# Patient Record
Sex: MALE
[De-identification: names, ages, dates, MRNs, and addresses within clinical notes are randomized per-mention and may not be internally consistent; named-entity substitution may affect disease eponyms.]

---

## 2017-01-01 ENCOUNTER — HOSPITAL (OUTPATIENT)
Dept: OTHER | Age: 82
End: 2017-01-01

## 2017-02-09 ENCOUNTER — APPOINTMENT (OUTPATIENT)
Dept: CT IMAGING | Facility: HOSPITAL | Age: 82
DRG: 191 | End: 2017-02-09
Attending: INTERNAL MEDICINE
Payer: MEDICARE

## 2017-02-09 ENCOUNTER — APPOINTMENT (OUTPATIENT)
Dept: GENERAL RADIOLOGY | Facility: HOSPITAL | Age: 82
DRG: 191 | End: 2017-02-09
Attending: EMERGENCY MEDICINE
Payer: MEDICARE

## 2017-02-09 ENCOUNTER — HOSPITAL ENCOUNTER (INPATIENT)
Facility: HOSPITAL | Age: 82
LOS: 8 days | Discharge: HOME | DRG: 191 | End: 2017-02-17
Attending: EMERGENCY MEDICINE | Admitting: INTERNAL MEDICINE
Payer: MEDICARE

## 2017-02-09 DIAGNOSIS — R04.2 HEMOPTYSIS: ICD-10-CM

## 2017-02-09 DIAGNOSIS — J20.9 ACUTE BRONCHITIS, UNSPECIFIED ORGANISM: Primary | ICD-10-CM

## 2017-02-09 DIAGNOSIS — N28.9 RENAL INSUFFICIENCY: ICD-10-CM

## 2017-02-09 DIAGNOSIS — J44.1 COPD EXACERBATION (HCC): ICD-10-CM

## 2017-02-09 LAB
ALBUMIN SERPL-MCNC: 3.4 G/DL (ref 3.5–4.8)
ALP LIVER SERPL-CCNC: 69 U/L (ref 45–117)
ALT SERPL-CCNC: 22 U/L (ref 17–63)
APTT PPP: 29.2 SECONDS (ref 25–34)
AST SERPL-CCNC: 15 U/L (ref 15–41)
BASOPHILS # BLD AUTO: 0.02 X10(3) UL (ref 0–0.1)
BASOPHILS NFR BLD AUTO: 0.2 %
BILIRUB SERPL-MCNC: 0.7 MG/DL (ref 0.1–2)
BUN BLD-MCNC: 23 MG/DL (ref 8–20)
CALCIUM BLD-MCNC: 9 MG/DL (ref 8.3–10.3)
CHLORIDE: 105 MMOL/L (ref 101–111)
CO2: 25 MMOL/L (ref 22–32)
CREAT BLD-MCNC: 1.87 MG/DL (ref 0.7–1.3)
D-DIMER: 0.89 UG/ML FEU (ref 0–0.49)
EOSINOPHIL # BLD AUTO: 0.1 X10(3) UL (ref 0–0.3)
EOSINOPHIL NFR BLD AUTO: 0.9 %
ERYTHROCYTE [DISTWIDTH] IN BLOOD BY AUTOMATED COUNT: 14.1 % (ref 11.5–16)
GLUCOSE BLD-MCNC: 139 MG/DL (ref 70–99)
HCT VFR BLD AUTO: 37.4 % (ref 37–53)
HGB BLD-MCNC: 12.2 G/DL (ref 13–17)
IMMATURE GRANULOCYTE COUNT: 0.07 X10(3) UL (ref 0–1)
IMMATURE GRANULOCYTE RATIO %: 0.6 %
INR BLD: 0.98 (ref 0.89–1.12)
LYMPHOCYTES # BLD AUTO: 0.63 X10(3) UL (ref 0.9–4)
LYMPHOCYTES NFR BLD AUTO: 5.6 %
M PROTEIN MFR SERPL ELPH: 7.1 G/DL (ref 6.1–8.3)
MCH RBC QN AUTO: 30.7 PG (ref 27–33.2)
MCHC RBC AUTO-ENTMCNC: 32.6 G/DL (ref 31–37)
MCV RBC AUTO: 94.2 FL (ref 80–99)
MONOCYTES # BLD AUTO: 0.98 X10(3) UL (ref 0.1–0.6)
MONOCYTES NFR BLD AUTO: 8.7 %
NEUTROPHIL ABS PRELIM: 9.45 X10 (3) UL (ref 1.3–6.7)
NEUTROPHILS # BLD AUTO: 9.45 X10(3) UL (ref 1.3–6.7)
NEUTROPHILS NFR BLD AUTO: 84 %
PLATELET # BLD AUTO: 224 10(3)UL (ref 150–450)
POTASSIUM SERPL-SCNC: 4.2 MMOL/L (ref 3.6–5.1)
PRO-BETA NATRIURETIC PEPTIDE: 595 PG/ML (ref ?–450)
PROCALCITONIN SERPL-MCNC: <0.11 NG/ML (ref ?–0.11)
PSA SERPL DL<=0.01 NG/ML-MCNC: 13.3 SECONDS (ref 12.3–14.8)
RBC # BLD AUTO: 3.97 X10(6)UL (ref 3.8–5.8)
RED CELL DISTRIBUTION WIDTH-SD: 48.3 FL (ref 35.1–46.3)
RESPIRATORY PANEL NEG:: NEGATIVE
SODIUM SERPL-SCNC: 140 MMOL/L (ref 136–144)
TROPONIN: <0.046 NG/ML (ref ?–0.05)
WBC # BLD AUTO: 11.3 X10(3) UL (ref 4–13)

## 2017-02-09 PROCEDURE — 94640 AIRWAY INHALATION TREATMENT: CPT

## 2017-02-09 PROCEDURE — 94060 EVALUATION OF WHEEZING: CPT

## 2017-02-09 PROCEDURE — 85730 THROMBOPLASTIN TIME PARTIAL: CPT | Performed by: EMERGENCY MEDICINE

## 2017-02-09 PROCEDURE — 87999 UNLISTED MICROBIOLOGY PX: CPT

## 2017-02-09 PROCEDURE — 86738 MYCOPLASMA ANTIBODY: CPT | Performed by: INTERNAL MEDICINE

## 2017-02-09 PROCEDURE — 83880 ASSAY OF NATRIURETIC PEPTIDE: CPT | Performed by: EMERGENCY MEDICINE

## 2017-02-09 PROCEDURE — 96374 THER/PROPH/DIAG INJ IV PUSH: CPT

## 2017-02-09 PROCEDURE — 87581 M.PNEUMON DNA AMP PROBE: CPT | Performed by: INTERNAL MEDICINE

## 2017-02-09 PROCEDURE — 80053 COMPREHEN METABOLIC PANEL: CPT | Performed by: EMERGENCY MEDICINE

## 2017-02-09 PROCEDURE — 71250 CT THORAX DX C-: CPT

## 2017-02-09 PROCEDURE — 85025 COMPLETE CBC W/AUTO DIFF WBC: CPT | Performed by: EMERGENCY MEDICINE

## 2017-02-09 PROCEDURE — 84484 ASSAY OF TROPONIN QUANT: CPT | Performed by: EMERGENCY MEDICINE

## 2017-02-09 PROCEDURE — 84145 PROCALCITONIN (PCT): CPT | Performed by: EMERGENCY MEDICINE

## 2017-02-09 PROCEDURE — 87205 SMEAR GRAM STAIN: CPT | Performed by: INTERNAL MEDICINE

## 2017-02-09 PROCEDURE — 85610 PROTHROMBIN TIME: CPT | Performed by: EMERGENCY MEDICINE

## 2017-02-09 PROCEDURE — 87798 DETECT AGENT NOS DNA AMP: CPT | Performed by: INTERNAL MEDICINE

## 2017-02-09 PROCEDURE — 87633 RESP VIRUS 12-25 TARGETS: CPT | Performed by: INTERNAL MEDICINE

## 2017-02-09 PROCEDURE — 87486 CHLMYD PNEUM DNA AMP PROBE: CPT | Performed by: INTERNAL MEDICINE

## 2017-02-09 PROCEDURE — 86713 LEGIONELLA ANTIBODY: CPT | Performed by: INTERNAL MEDICINE

## 2017-02-09 PROCEDURE — 93010 ELECTROCARDIOGRAM REPORT: CPT

## 2017-02-09 PROCEDURE — 71010 XR CHEST AP PORTABLE  (CPT=71010): CPT

## 2017-02-09 PROCEDURE — 85378 FIBRIN DEGRADE SEMIQUANT: CPT | Performed by: INTERNAL MEDICINE

## 2017-02-09 PROCEDURE — 99285 EMERGENCY DEPT VISIT HI MDM: CPT

## 2017-02-09 PROCEDURE — 87070 CULTURE OTHR SPECIMN AEROBIC: CPT | Performed by: INTERNAL MEDICINE

## 2017-02-09 PROCEDURE — 93005 ELECTROCARDIOGRAM TRACING: CPT

## 2017-02-09 RX ORDER — METHYLPREDNISOLONE SODIUM SUCCINATE 40 MG/ML
60 INJECTION, POWDER, LYOPHILIZED, FOR SOLUTION INTRAMUSCULAR; INTRAVENOUS EVERY 12 HOURS
Status: DISCONTINUED | OUTPATIENT
Start: 2017-02-09 | End: 2017-02-10

## 2017-02-09 RX ORDER — IPRATROPIUM BROMIDE AND ALBUTEROL SULFATE 2.5; .5 MG/3ML; MG/3ML
3 SOLUTION RESPIRATORY (INHALATION)
Status: DISCONTINUED | OUTPATIENT
Start: 2017-02-09 | End: 2017-02-17

## 2017-02-09 RX ORDER — IPRATROPIUM BROMIDE AND ALBUTEROL SULFATE 2.5; .5 MG/3ML; MG/3ML
3 SOLUTION RESPIRATORY (INHALATION) ONCE
Status: COMPLETED | OUTPATIENT
Start: 2017-02-09 | End: 2017-02-09

## 2017-02-09 RX ORDER — IPRATROPIUM BROMIDE AND ALBUTEROL SULFATE 2.5; .5 MG/3ML; MG/3ML
3 SOLUTION RESPIRATORY (INHALATION) EVERY 6 HOURS PRN
Status: DISCONTINUED | OUTPATIENT
Start: 2017-02-10 | End: 2017-02-17

## 2017-02-09 RX ORDER — IPRATROPIUM BROMIDE AND ALBUTEROL SULFATE 2.5; .5 MG/3ML; MG/3ML
3 SOLUTION RESPIRATORY (INHALATION) 2 TIMES DAILY
Status: DISCONTINUED | OUTPATIENT
Start: 2017-02-09 | End: 2017-02-09

## 2017-02-09 RX ORDER — FUROSEMIDE 20 MG/1
20 TABLET ORAL DAILY
COMMUNITY
End: 2017-03-19

## 2017-02-09 RX ORDER — PANTOPRAZOLE SODIUM 40 MG/1
40 TABLET, DELAYED RELEASE ORAL
Status: DISCONTINUED | OUTPATIENT
Start: 2017-02-10 | End: 2017-02-17

## 2017-02-09 RX ORDER — FUROSEMIDE 20 MG/1
20 TABLET ORAL DAILY
Status: DISCONTINUED | OUTPATIENT
Start: 2017-02-10 | End: 2017-02-17

## 2017-02-09 RX ORDER — HEPARIN SODIUM 5000 [USP'U]/ML
5000 INJECTION, SOLUTION INTRAVENOUS; SUBCUTANEOUS EVERY 8 HOURS SCHEDULED
Status: DISCONTINUED | OUTPATIENT
Start: 2017-02-09 | End: 2017-02-17

## 2017-02-09 RX ORDER — IPRATROPIUM BROMIDE AND ALBUTEROL SULFATE 2.5; .5 MG/3ML; MG/3ML
3 SOLUTION RESPIRATORY (INHALATION)
Status: DISCONTINUED | OUTPATIENT
Start: 2017-02-09 | End: 2017-02-09

## 2017-02-09 RX ORDER — LEVOFLOXACIN 750 MG/1
750 TABLET ORAL DAILY
Status: ON HOLD | COMMUNITY
End: 2017-02-17

## 2017-02-09 RX ORDER — METHYLPREDNISOLONE SODIUM SUCCINATE 125 MG/2ML
100 INJECTION, POWDER, LYOPHILIZED, FOR SOLUTION INTRAMUSCULAR; INTRAVENOUS ONCE
Status: COMPLETED | OUTPATIENT
Start: 2017-02-09 | End: 2017-02-09

## 2017-02-09 RX ORDER — ASPIRIN 81 MG/1
81 TABLET ORAL DAILY
Status: DISCONTINUED | OUTPATIENT
Start: 2017-02-09 | End: 2017-02-17

## 2017-02-09 NOTE — ED PROVIDER NOTES
Patient Seen in: [facility] Emergency Department    History   Patient presents with:  Dyspnea APRIL SOB (respiratory)    Stated Complaint: APRIL    HPI  Patient is an 80-year-old male who for the past 5 or 6 days has had cough productive yellowish phleg seen  Patient taking differently: 13 mg. 4 by mouth every morning for 1 day then 3-1/2 pills by mouth every morning for 3 days then 3 by mouth every morning for 5 days then 2 by mouth every morning for 5 days then 1 by mouth every morning until seen    Calvin above.    PSFH elements reviewed from today and agreed except as otherwise stated in HPI.     Physical Exam       ED Triage Vitals   BP 02/09/17 0856 138/75 mmHg   Pulse 02/09/17 0856 92   Resp 02/09/17 0856 22   Temp 02/09/17 0856 98.2 °F (36.8 °C)   Temp Normal   PTT, ACTIVATED - Normal    Narrative: The aPTT Heparin Therapeutic Range is approximately 65- 104 seconds. The therapeutic range has been validated against 0.3-0.7 heparin anti-Xa units/mL.      TROPONIN I - Normal   CBC WITH DIFFERENTIAL WITH Reviewed: 11/30/2015          ICD-10-CM Noted POA    Acute bronchitis J20.9 2/9/2017 Unknown

## 2017-02-09 NOTE — PLAN OF CARE
Pt transferred to room 2603 around 1230. Admitted from ED for bronchitis and hemoptysis. Pt a/o x 4, 3L nasal cannula, v-paced on tele monitor in 90s. Lungs diminished bilaterally, exp wheezing noted to left lower lobe. C/o dyspnea upon exertion.    P

## 2017-02-09 NOTE — ED INITIAL ASSESSMENT (HPI)
Pt states he has h/o copd and started with cough congestion and yellow secretions on Sunday. DR Cinthia Akers PLACED PT ON LEVAQUIN AND PT STARTED WITH WORSENING SOB ON Sunday THAT HAS NOT IMPROVED SINCE THEN. PT STATES THIS IS THE WORST EPISODE OF SOB HE HAS HAD.

## 2017-02-09 NOTE — CM/SW NOTE
SW met with patient to assess for discharge needs. Patient identified he lives at home with his son. Patient reports being independent in ADL's prior to Sunday, when he began to experience shortness of breath, and began coughing up yellow/bloody sputum.

## 2017-02-09 NOTE — PROGRESS NOTES
[facility] Cardiology Consultation Karlene Gaston MD    The patient was interviewed, examined, the chart was reviewed and the consult was dictated. This is a 80year old male with a chief complaint of SOB . Impression:  1.   Probable COPD exacerbation, underl

## 2017-02-09 NOTE — H&P
General Medicine H&P     Patient presents with:  Dyspnea APRIL SOB (respiratory)       PCP: None Pcp    History of Present Illness: Patient is a 80year old male with PMH sig for HTN, COPD on home o2 3L, GERD, heart block c PPM, who p/t [facility] ED c cough and PNA Eye Problems Brother      Eye disorder   • Other[other] [OTHER] Brother      Hearing loss       Review of Systems  Comprehensive ROS reviewed and negative except for what's stated above.  \    OBJECTIVE:  /61 mmHg  Pulse 103  Temp(Src) 98.5 °F (36.9 ° by: Austin Dinh MD on 2/09/2017 at 9:44     Approved by: Austin Dinh MD                 ASSESSMENT / PLAN:    80year old male with PMH sig for HTN, COPD on home o2 3L, GERD, heart block c PPM, who p/t [facility] ED c cough and PNA.      RLL PNA, productive sputum c he

## 2017-02-09 NOTE — CONSULTS
[facility]  Report of Consultation    Candace Carlton Patient Status:  Emergency    10/3/1932 MRN [id number]   Location [facility] Attending Mane Whittington MD   Hosp Day # 0 PCP None Pcp     Reason for Consultation:  Dyspnea OTHER DISEASES      barretts esphogus   • PULMONARY DISEASE      fibrosis   • COPD (chronic obstructive pulmonary disease) (HCC)    • Esophageal reflux    • Chicken pox    • Measles    • Hemorrhoids    • Lung disease    • Sinus problem    • COPD (chronic o (5' 9\") 171 lb (77.565 kg)           Physical Exam:   General: alert, cooperative, oriented. No respiratory distress. Head: Normocephalic, without obvious abnormality, atraumatic.    Eyes/Nose: No obvious lesion   Throat: Lips, mucosa, and tongue normal Hoarseness     Pleural effusion     At risk for falling     COPD with acute exacerbation (HCC)     COPD exacerbation (HCC)     Cardiac pacemaker in situ     Postural dizziness with presyncope     Syncopal episodes     Acute bronchitis    Impression      1.

## 2017-02-10 LAB
ALBUMIN SERPL-MCNC: 3 G/DL (ref 3.5–4.8)
ALP LIVER SERPL-CCNC: 59 U/L (ref 45–117)
ALT SERPL-CCNC: 17 U/L (ref 17–63)
AST SERPL-CCNC: 11 U/L (ref 15–41)
BASOPHILS # BLD AUTO: 0.01 X10(3) UL (ref 0–0.1)
BASOPHILS NFR BLD AUTO: 0.1 %
BILIRUB SERPL-MCNC: 0.9 MG/DL (ref 0.1–2)
BUN BLD-MCNC: 27 MG/DL (ref 8–20)
CALCIUM BLD-MCNC: 8.8 MG/DL (ref 8.3–10.3)
CHLORIDE: 102 MMOL/L (ref 101–111)
CO2: 27 MMOL/L (ref 22–32)
CREAT BLD-MCNC: 1.75 MG/DL (ref 0.7–1.3)
EOSINOPHIL # BLD AUTO: 0 X10(3) UL (ref 0–0.3)
EOSINOPHIL NFR BLD AUTO: 0 %
ERYTHROCYTE [DISTWIDTH] IN BLOOD BY AUTOMATED COUNT: 13.9 % (ref 11.5–16)
GLUCOSE BLD-MCNC: 159 MG/DL (ref 70–99)
HAV IGM SER QL: 2.2 MG/DL (ref 1.7–3)
HCT VFR BLD AUTO: 33.9 % (ref 37–53)
HGB BLD-MCNC: 11.2 G/DL (ref 13–17)
IMMATURE GRANULOCYTE COUNT: 0.07 X10(3) UL (ref 0–1)
IMMATURE GRANULOCYTE RATIO %: 0.8 %
LYMPHOCYTES # BLD AUTO: 0.39 X10(3) UL (ref 0.9–4)
LYMPHOCYTES NFR BLD AUTO: 4.4 %
M PROTEIN MFR SERPL ELPH: 6.5 G/DL (ref 6.1–8.3)
MCH RBC QN AUTO: 31.2 PG (ref 27–33.2)
MCHC RBC AUTO-ENTMCNC: 33 G/DL (ref 31–37)
MCV RBC AUTO: 94.4 FL (ref 80–99)
MONOCYTES # BLD AUTO: 0.18 X10(3) UL (ref 0.1–0.6)
MONOCYTES NFR BLD AUTO: 2 %
NEUTROPHIL ABS PRELIM: 8.19 X10 (3) UL (ref 1.3–6.7)
NEUTROPHILS # BLD AUTO: 8.19 X10(3) UL (ref 1.3–6.7)
NEUTROPHILS NFR BLD AUTO: 92.7 %
PHOSPHATE SERPL-MCNC: 3.7 MG/DL (ref 2.5–4.9)
PLATELET # BLD AUTO: 205 10(3)UL (ref 150–450)
POTASSIUM SERPL-SCNC: 4.4 MMOL/L (ref 3.6–5.1)
RBC # BLD AUTO: 3.59 X10(6)UL (ref 3.8–5.8)
RED CELL DISTRIBUTION WIDTH-SD: 48.4 FL (ref 35.1–46.3)
SODIUM SERPL-SCNC: 138 MMOL/L (ref 136–144)
WBC # BLD AUTO: 8.8 X10(3) UL (ref 4–13)

## 2017-02-10 PROCEDURE — 80053 COMPREHEN METABOLIC PANEL: CPT | Performed by: INTERNAL MEDICINE

## 2017-02-10 PROCEDURE — 87449 NOS EACH ORGANISM AG IA: CPT | Performed by: INTERNAL MEDICINE

## 2017-02-10 PROCEDURE — 94640 AIRWAY INHALATION TREATMENT: CPT

## 2017-02-10 PROCEDURE — 84100 ASSAY OF PHOSPHORUS: CPT | Performed by: INTERNAL MEDICINE

## 2017-02-10 PROCEDURE — 85025 COMPLETE CBC W/AUTO DIFF WBC: CPT | Performed by: INTERNAL MEDICINE

## 2017-02-10 PROCEDURE — 83735 ASSAY OF MAGNESIUM: CPT | Performed by: INTERNAL MEDICINE

## 2017-02-10 PROCEDURE — 87205 SMEAR GRAM STAIN: CPT | Performed by: INTERNAL MEDICINE

## 2017-02-10 PROCEDURE — 80299 QUANTITATIVE ASSAY DRUG: CPT | Performed by: INTERNAL MEDICINE

## 2017-02-10 RX ORDER — PREDNISONE 20 MG/1
40 TABLET ORAL
Status: DISCONTINUED | OUTPATIENT
Start: 2017-02-11 | End: 2017-02-13

## 2017-02-10 NOTE — PROGRESS NOTES
[facility] Hospitalist Progress Note     PCP: None Pcp    Chief Complaint: follow-up    Overnight/Interim Events:      SUBJECTIVE:  Pt sitting in chair. On 4L. Feels overall about the same. Cough a little better but still c sputum, a little blood.  No f/c, hasn' piperacillin-tazobactam  3.375 g Intravenous Q8H   • aspirin  81 mg Oral Daily   • Pantoprazole Sodium  40 mg Oral QAM AC   • furosemide  20 mg Oral Daily   • Heparin Sodium (Porcine)  5,000 Units Subcutaneous Q8H [address]        CEPACOL, ipratropium-albuterol

## 2017-02-10 NOTE — CONSULTS
[facility]    PATIENT'S NAME: Pavel Bethany   ATTENDING PHYSICIAN: Kadi Loco M.D.   CONSULTING PHYSICIAN: Lorri Soler M.D.    PATIENT ACCOUNT#:   [de-identified]    LOCATION:  [address] [place] [address]  MEDICAL RECORD #:   [id number]       DATE OF BIRTH distress. VITAL SIGNS:  Blood pressure is 132/60, pulse is 100. He is afebrile. HEENT:  Normocephalic, anicteric sclerae. NECK:  Supple. No thyromegaly, adenopathy. LUNGS:  Diminished breath sounds. Occasional rhonchi in bases.   HEART:  There is no

## 2017-02-10 NOTE — CDS QUERY
Pneumonia Specificity  CLINICAL DOCUMENTATION CLARIFICATION FORM    Dear Doctor:  Clinical information (provided below) indicates pneumonia.  For accurate ICD-10-CM code assignment to reflect severity of illness and risk of mortality,   PLEASE (X) ALL Mancil Frater

## 2017-02-10 NOTE — PROGRESS NOTES
[facility] Pulmonary/Critical Care Progress Note     SUBJECTIVE/24H Events: All events, procedures, notes reviewed.  Less sputum and hemoptysis, still doesn't feel great      Review of Systems:   A comprehensive 14 point for input(s): ABGPHT, GCXHZT0R, LVGOT6S, ABGHCO3, ABGBE, TEMP, YUE, SITE, DEV, THGB in the last 72 hours. Invalid input(s): DCO49BIA, SHOLA    Other Labs: neg PCt neg RVP    Interval Culture Data:   No results found for this visit on 02/09/17.   No resu

## 2017-02-10 NOTE — PROGRESS NOTES
[facility] Cardiology Progress Note - Mason Briscoe Patient Status:  Inpatient    10/3/1932 MRN [id number]   [facility] 2NE-A Attending Dalia Shea MD   Hosp Day # 1 PCP None Pcp     Subjective:  Patient has b excursions and effort. Abdomen: Soft, non-tender. No organosplenomegally, mass or rebound, BS-present. Extremities: Without clubbing, cyanosis or edema. Peripheral pulses are 2+. Neurologic: Alert and oriented, normal affect.  No motor or coordinational Q6H PRN       Nacho Fall MD  2/10/2017  1:21 PM

## 2017-02-10 NOTE — PROGRESS NOTES
02/10/17 1739   Clinical Encounter Type   Visited With Patient   Routine Visit Follow-up   Continue Visiting Yes  (Patient request for a  only)   Patient Spiritual Encounters   Spiritual Needs Patient expressed a need to feel in control   Spiritua

## 2017-02-10 NOTE — PLAN OF CARE
Assumed care of patient around 0700. Pt admitted yesterday from ED for bronchitis and hemoptysis. Pt a/o x 4, 3L nasal cannula sats 100%, v-paced on tele monitor in 90s. Lungs clear/diminished bilaterally. C/o dyspnea upon exertion.  Upon ambulation, O2

## 2017-02-11 LAB
ATRIAL RATE: 88 BPM
BUN BLD-MCNC: 42 MG/DL (ref 8–20)
CALCIUM BLD-MCNC: 8.4 MG/DL (ref 8.3–10.3)
CHLORIDE: 104 MMOL/L (ref 101–111)
CO2: 28 MMOL/L (ref 22–32)
CREAT BLD-MCNC: 1.98 MG/DL (ref 0.7–1.3)
ERYTHROCYTE [DISTWIDTH] IN BLOOD BY AUTOMATED COUNT: 14.4 % (ref 11.5–16)
GLUCOSE BLD-MCNC: 108 MG/DL (ref 70–99)
HAV IGM SER QL: 2.3 MG/DL (ref 1.7–3)
HCT VFR BLD AUTO: 33 % (ref 37–53)
HGB BLD-MCNC: 10.9 G/DL (ref 13–17)
MCH RBC QN AUTO: 31.2 PG (ref 27–33.2)
MCHC RBC AUTO-ENTMCNC: 33 G/DL (ref 31–37)
MCV RBC AUTO: 94.6 FL (ref 80–99)
MYCOPLASMA PNEUMONIAE AB, IGM: 0.09 U/L
P AXIS: 35 DEGREES
P-R INTERVAL: 168 MS
PLATELET # BLD AUTO: 215 10(3)UL (ref 150–450)
POTASSIUM SERPL-SCNC: 4 MMOL/L (ref 3.6–5.1)
Q-T INTERVAL: 394 MS
QRS DURATION: 150 MS
QTC CALCULATION (BEZET): 476 MS
R AXIS: -76 DEGREES
RBC # BLD AUTO: 3.49 X10(6)UL (ref 3.8–5.8)
RED CELL DISTRIBUTION WIDTH-SD: 49.1 FL (ref 35.1–46.3)
SODIUM SERPL-SCNC: 141 MMOL/L (ref 136–144)
STREPTOCOCCUS PNEUMONIAE AG, U: NEGATIVE
T AXIS: 88 DEGREES
VENTRICULAR RATE: 88 BPM
WBC # BLD AUTO: 9.6 X10(3) UL (ref 4–13)

## 2017-02-11 PROCEDURE — 85027 COMPLETE CBC AUTOMATED: CPT | Performed by: INTERNAL MEDICINE

## 2017-02-11 PROCEDURE — 80048 BASIC METABOLIC PNL TOTAL CA: CPT | Performed by: INTERNAL MEDICINE

## 2017-02-11 PROCEDURE — 83735 ASSAY OF MAGNESIUM: CPT | Performed by: INTERNAL MEDICINE

## 2017-02-11 PROCEDURE — 94640 AIRWAY INHALATION TREATMENT: CPT

## 2017-02-11 NOTE — PROGRESS NOTES
[facility] Pulmonary/Critical Care Progress Note     SUBJECTIVE/24H Events: All events, procedures, notes reviewed.  A little better today      Review of Systems:   A comprehensive 14 point review of systems was complete 8.8  9.6   PLT  224.0  205.0  215.0     No results for input(s): BNP in the last 72 hours.   Recent Labs   Lab  02/09/17   0911   TROP  <0.046     Recent Labs   Lab  02/09/17 0911   INR  0.98   PTT  29.2     No results for input(s): ABGPHT, LEUHXA3G, ABGP inhalers, steroids  · PT/IS/OOBTC  · DVT/GI prophylaxis    Dori Azevedo MD MPH [address] [facility]

## 2017-02-11 NOTE — PROGRESS NOTES
[facility] Cardiology Progress Note - Ruthann Shine Patient Status:  Inpatient    10/3/1932 MRN [id number]   [facility] 2NE-A Attending Unique Ortega MD   Hosp Day # 2 PCP None Pcp     Subjective:  Chart reviewe adenopathy. Neck: No JVD, carotids 2+, no bruits. Cardiac: Regular rate and rhythm. S1, S2 normal. No murmur, pericardial rub, S3, thrill, heave or extra cardiac sounds. Lungs: Clear without wheezes, rales, rhonchi or dullness.   Normal excursions and ef Intravenous Q8H   aspirin EC tab 81 mg 81 mg Oral Daily   Pantoprazole Sodium (PROTONIX) EC tab 40 mg 40 mg Oral QAM AC   furosemide (LASIX) tab 20 mg 20 mg Oral Daily   Heparin Sodium (Porcine) 5000 UNIT/ML injection 5,000 Units 5,000 Units Subcutaneous Q

## 2017-02-11 NOTE — PROGRESS NOTES
a  [facility] Hospitalist Progress Note     PCP: None Pcp    Chief Complaint: follow-up    Overnight/Interim Events:      SUBJECTIVE:  Pt reports coughing, still some blood/pale yellow sputum. HR 120s, reports spo2 to 80s while walking to bathroom. No cp.     OBJE predniSONE  40 mg Oral Daily with breakfast   • Fluticasone Furoate-Vilanterol  1 puff Inhalation Daily   • Umeclidinium Bromide  1 puff Inhalation Daily   • ipratropium-albuterol  3 mL Nebulization QID   • piperacillin-tazobactam  3.375 g Intravenous Q8H

## 2017-02-12 LAB — LEGIONELLA PNEUMOPHILA AG, URI: NEGATIVE

## 2017-02-12 PROCEDURE — 94640 AIRWAY INHALATION TREATMENT: CPT

## 2017-02-12 PROCEDURE — 94664 DEMO&/EVAL PT USE INHALER: CPT

## 2017-02-12 NOTE — PROGRESS NOTES
[facility] Pulmonary/Critical Care Progress Note     SUBJECTIVE/24H Events: All events, procedures, notes reviewed. Feels better      Review of Systems:   A comprehensive 14 point review of systems was completed.    Pert hours. No results for input(s): PT, INR, PTT in the last 72 hours. No results for input(s): ABGPHT, VTJFDZ0P, LIEDZ3K, ABGHCO3, ABGBE, TEMP, YUE, SITE, DEV, THGB in the last 72 hours. Invalid input(s): UDG55OGU, CHOB    Other Labs:     Interval Cultu

## 2017-02-12 NOTE — PROGRESS NOTES
a  [facility] Hospitalist Progress Note     PCP: None Pcp    Chief Complaint: follow-up    Overnight/Interim Events:  Bried episode vtach per report    SUBJECTIVE:  Pt sitting in chair. Feeling better, on 4L. Cough improving, no blood. Using IS.  [facility] Fluticasone Furoate-Vilanterol  1 puff Inhalation Daily   • Umeclidinium Bromide  1 puff Inhalation Daily   • ipratropium-albuterol  3 mL Nebulization QID   • aspirin  81 mg Oral Daily   • Pantoprazole Sodium  40 mg Oral QAM AC   • furosemide  20 mg Oral D

## 2017-02-13 LAB
BUN BLD-MCNC: 31 MG/DL (ref 8–20)
CALCIUM BLD-MCNC: 9.1 MG/DL (ref 8.3–10.3)
CHLORIDE: 105 MMOL/L (ref 101–111)
CO2: 28 MMOL/L (ref 22–32)
CREAT BLD-MCNC: 1.75 MG/DL (ref 0.7–1.3)
ERYTHROCYTE [DISTWIDTH] IN BLOOD BY AUTOMATED COUNT: 14.3 % (ref 11.5–16)
GLUCOSE BLD-MCNC: 86 MG/DL (ref 70–99)
HAV IGM SER QL: 2.3 MG/DL (ref 1.7–3)
HCT VFR BLD AUTO: 33.2 % (ref 37–53)
HGB BLD-MCNC: 11.1 G/DL (ref 13–17)
MCH RBC QN AUTO: 31.6 PG (ref 27–33.2)
MCHC RBC AUTO-ENTMCNC: 33.4 G/DL (ref 31–37)
MCV RBC AUTO: 94.6 FL (ref 80–99)
PLATELET # BLD AUTO: 230 10(3)UL (ref 150–450)
POTASSIUM SERPL-SCNC: 4.2 MMOL/L (ref 3.6–5.1)
RBC # BLD AUTO: 3.51 X10(6)UL (ref 3.8–5.8)
RED CELL DISTRIBUTION WIDTH-SD: 49.2 FL (ref 35.1–46.3)
SODIUM SERPL-SCNC: 140 MMOL/L (ref 136–144)
WBC # BLD AUTO: 9.5 X10(3) UL (ref 4–13)

## 2017-02-13 PROCEDURE — 94664 DEMO&/EVAL PT USE INHALER: CPT

## 2017-02-13 PROCEDURE — 83735 ASSAY OF MAGNESIUM: CPT | Performed by: INTERNAL MEDICINE

## 2017-02-13 PROCEDURE — 94640 AIRWAY INHALATION TREATMENT: CPT

## 2017-02-13 PROCEDURE — 80048 BASIC METABOLIC PNL TOTAL CA: CPT | Performed by: INTERNAL MEDICINE

## 2017-02-13 PROCEDURE — 85027 COMPLETE CBC AUTOMATED: CPT | Performed by: INTERNAL MEDICINE

## 2017-02-13 NOTE — PROGRESS NOTES
02/13/17 1326   Clinical Encounter Type   Referral To (Referral made to Father Karina Suarez for visit from a , as requested by the patient. )

## 2017-02-13 NOTE — PROGRESS NOTES
[facility] Cardiology Progress Note - April Bentley Patient Status:  Inpatient    10/3/1932 MRN [id number]   [facility] 2NE-A Attending Jocelyn Jara MD   Hosp Day # 4 PCP None Pcp     Subjective:  Patient is gr S3, thrill, heave or extra cardiac sounds. Lungs: Clear without wheezes, rales, rhonchi or dullness. Normal excursions and effort. Abdomen: Soft, non-tender. No organosplenomegally, mass or rebound, BS-present.   Extremities: Without clubbing, cyanosis o ipratropium-albuterol (DUONEB) nebulizer solution 3 mL 3 mL Nebulization Q6H PRN       Jayne Vásquez MD  2/13/2017  10:55 AM

## 2017-02-13 NOTE — PROGRESS NOTES
[facility]  Progress Note    Corin Ruiz Patient Status:  Inpatient    10/3/1932 MRN [id number]   [facility] 2NE-A Attending Amor Horner MD   Hosp Day # 4 PCP None Pcp     ASSESSMENT      · Bronchitis  · Hemoptysis due to a kg)  05/27/16 : 167 lb (75.751 kg)  11/16/15 : 173 lb (78.472 kg)  11/10/15 : 171 lb (77.565 kg)      Physical Exam:  General: alert, oriented, no apparent distress  Heart: Regular rate and rhythm, normal S1S2  Lungs:  wheezign   Abdomen: soft, nontender, injection 5,000 Units 5,000 Units Subcutaneous Q8H [address]   ipratropium-albuterol (DUONEB) nebulizer solution 3 mL 3 mL Nebulization Q6H PRN       PEAK flow  PF Readings from Last 1 Encounters:  No data found for PF        Brandi Kid Anahi  2/13/2017  3:09

## 2017-02-13 NOTE — PROGRESS NOTES
a  [facility] Hospitalist Progress Note     PCP: None Pcp    Chief Complaint: follow-up    Overnight/Interim Events:    SUBJECTIVE:  Pt sitting in chair. Feeling better in terms of breathing, on 4L. Little cough.  Walked to bathroom today and caridads sharmin Quinonez Later yester Intravenous Q8H   • azithromycin  500 mg Intravenous Q24H   • Fluticasone Furoate-Vilanterol  1 puff Inhalation Daily   • Umeclidinium Bromide  1 puff Inhalation Daily   • ipratropium-albuterol  3 mL Nebulization QID   • aspirin  81 mg Oral Daily   • Panto

## 2017-02-14 ENCOUNTER — APPOINTMENT (OUTPATIENT)
Dept: GENERAL RADIOLOGY | Facility: HOSPITAL | Age: 82
DRG: 191 | End: 2017-02-14
Attending: INTERNAL MEDICINE
Payer: MEDICARE

## 2017-02-14 PROCEDURE — 94640 AIRWAY INHALATION TREATMENT: CPT

## 2017-02-14 PROCEDURE — 36415 COLL VENOUS BLD VENIPUNCTURE: CPT

## 2017-02-14 PROCEDURE — 71020 XR CHEST PA + LAT CHEST (CPT=71020): CPT

## 2017-02-14 NOTE — CM/SW NOTE
Patient discussed in rounds with RN. Patient is on 3L O2 at rest which is patient's home amount, is needing 5L O2 with ambulation. SW endorsed to RN that patient will need home O2 walk if continues to have increased O2 need with ambulation.  At this time pa

## 2017-02-14 NOTE — PLAN OF CARE
Problem: CARDIOVASCULAR - ADULT  Goal: Maintains optimal cardiac output and hemodynamic stability  INTERVENTIONS:  - Monitor vital signs, rhythm, and trends  - Monitor for bleeding, hypotension and signs of decreased cardiac output  - Evaluate effectivenes 5L while walking earlier in the day. Plan is to continue IV antibiotics, nebs QID, lasix PO daily, inhalers and start prednisone in am.  He stated he \"feels better then when I came in\". Will monitor.

## 2017-02-14 NOTE — PROGRESS NOTES
[facility]  Progress Note    Krishna Person Patient Status:  Inpatient    10/3/1932 MRN [id number]   [facility] 2NE-A Attending Marko Pacheco MD   Hosp Day # 5 PCP None Pcp     Subjective:  Krishna Person is PRN   azithromycin (ZITHROMAX) 500 mg in sodium chloride 0.9 % 250 mL IVPB add-vantage 500 mg Intravenous Q24H   Fluticasone Furoate-Vilanterol (BREO ELLIPTA) 200-25 MCG/INH inhaler 1 puff 1 puff Inhalation Daily   Umeclidinium Bromide (INCRUSE ELLIPTA) 62 Neuro nonfocal   PLAN AS ABOVE   Slept Ok ( in chair) , did not ambulate last 24 h , except to bathroom - discussed with RN to [facility] for a walk\"  Breathing is better, cough same , cxr same   , up to 120 with activity  P:  Slow steroid taper   Ambulat

## 2017-02-14 NOTE — PROCEDURES
This test includes spirometry and flow volume loop done at the bedside during an inpatient hospitalization. Spirometry and  flow volume loop show evidence of moderate to severe obstruction.   .    There was no change in spirometry after bronchodilator ch

## 2017-02-15 PROCEDURE — 94640 AIRWAY INHALATION TREATMENT: CPT

## 2017-02-15 PROCEDURE — 94664 DEMO&/EVAL PT USE INHALER: CPT

## 2017-02-15 RX ORDER — GARLIC EXTRACT 500 MG
1 CAPSULE ORAL DAILY
Status: DISCONTINUED | OUTPATIENT
Start: 2017-02-15 | End: 2017-02-17

## 2017-02-15 RX ORDER — ACETYLCYSTEINE 200 MG/ML
2 SOLUTION ORAL; RESPIRATORY (INHALATION) 3 TIMES DAILY
Status: DISCONTINUED | OUTPATIENT
Start: 2017-02-15 | End: 2017-02-17

## 2017-02-15 NOTE — PROGRESS NOTES
a  [facility] Hospitalist Progress Note     PCP: None Pcp    Chief Complaint: follow-up      SUBJECTIVE:  Feels well this morning. 3L at rest, 5L with exertion.      OBJECTIVE:  Temp:  [97.9 °F (36.6 °C)-98.5 °F (36.9 °C)] 98.5 °F (36.9 °C)  Pulse:  [] 101 Inhalation Daily   • Umeclidinium Bromide  1 puff Inhalation Daily   • ipratropium-albuterol  3 mL Nebulization QID   • aspirin  81 mg Oral Daily   • Pantoprazole Sodium  40 mg Oral QAM AC   • furosemide  20 mg Oral Daily   • Heparin Sodium (Porcine)  5,00

## 2017-02-15 NOTE — PROGRESS NOTES
[facility]  Progress Note    Carter Hernandez Patient Status:  Inpatient    10/3/1932 MRN [id number]   [facility] 2NE-A Attending Elisa Murillo MD   Hosp Day # 6 PCP None Pcp     Assessment:      · Acute Bronchitis  · Hemoptysis / abov °C), temperature source Oral, resp. rate 22, height 5' 9\" (1.753 m), weight 167 lb 12.3 oz (76.1 kg), SpO2 98 %.     Intake/Output:    Intake/Output Summary (Last 24 hours) at 02/15/17 1317  Last data filed at 02/15/17 1300   Gross per 24 hour   Intake   1 (INCRUSE ELLIPTA) 62.5 MCG/INH inhaler 1 puff 1 puff Inhalation Daily   ipratropium-albuterol (DUONEB) nebulizer solution 3 mL 3 mL Nebulization QID   aspirin EC tab 81 mg 81 mg Oral Daily   Pantoprazole Sodium (PROTONIX) EC tab 40 mg 40 mg Oral QAM AC   f

## 2017-02-15 NOTE — PROGRESS NOTES
[facility] Cardiology Progress Note - Gil Merino Patient Status:  Inpatient    10/3/1932 MRN [id number]   [facility] 2NE-A Attending Nayan Escoto MD   Hosp Day # 5 PCP None Pcp     Subjective:  Feeling better at  wheezes, rales, rhonchi or dullness. Normal excursions and effort. Abdomen: Soft, non-tender. No organosplenomegally, mass or rebound, BS-present. Extremities: Without clubbing, cyanosis or edema. Peripheral pulses are 2+.   Neurologic: Alert and orient

## 2017-02-15 NOTE — PROGRESS NOTES
a  [facility] Hospitalist Progress Note     PCP: None Pcp    Chief Complaint: follow-up      SUBJECTIVE:  Doing okay. States that he thinks he  Is getting closer to baseline. Still needing to turn up O2 for basic activities like going to bathroom. Eating okay. ipratropium-albuterol  3 mL Nebulization QID   • aspirin  81 mg Oral Daily   • Pantoprazole Sodium  40 mg Oral QAM AC   • furosemide  20 mg Oral Daily   • Heparin Sodium (Porcine)  5,000 Units Subcutaneous Q8H [address]        CEPACOL, ipratropium-albuterol

## 2017-02-15 NOTE — PROGRESS NOTES
[facility] Cardiology Progress Note - Justin Marioter Patient Status:  Inpatient    10/3/1932 MRN [id number]   [facility] 2NE-A Attending Diana Smith MD   Hosp Day # 6 PCP None Pcp     Subjective:   The patient feels ok rate and rhythm. S1, S2 normal. No murmur, pericardial rub, S3, thrill, heave or extra cardiac sounds. Lungs: Clear without wheezes, rales, rhonchi or dullness. Normal excursions and effort. Abdomen: Soft, non-tender.  No organosplenomegally, mass or felicia solution 3 mL 3 mL Nebulization Q6H PRN       Sari Saunders MD  2/15/2017  12:55 PM

## 2017-02-16 PROCEDURE — 87070 CULTURE OTHR SPECIMN AEROBIC: CPT | Performed by: INTERNAL MEDICINE

## 2017-02-16 PROCEDURE — 94640 AIRWAY INHALATION TREATMENT: CPT

## 2017-02-16 PROCEDURE — 87205 SMEAR GRAM STAIN: CPT | Performed by: INTERNAL MEDICINE

## 2017-02-16 PROCEDURE — 94667 MNPJ CHEST WALL 1ST: CPT

## 2017-02-16 RX ORDER — METHYLPREDNISOLONE SODIUM SUCCINATE 40 MG/ML
40 INJECTION, POWDER, LYOPHILIZED, FOR SOLUTION INTRAMUSCULAR; INTRAVENOUS EVERY 8 HOURS
Status: COMPLETED | OUTPATIENT
Start: 2017-02-16 | End: 2017-02-17

## 2017-02-16 NOTE — PROGRESS NOTES
SpO2 on 3L at rest: 100%  SpO2 on 6L during ambulation of 200 ft: 87%   ----> after 30 second break, saturations resolved to 90% or better

## 2017-02-16 NOTE — PLAN OF CARE
Denies any difficulty breathing tonight. 3L O2 at rest. No desaturations with activity tonight, but patient has not ambulated more than short distances in his room. States still has productive, yellow cough but hemoptysis has resolved.    Continued on antib

## 2017-02-16 NOTE — PROGRESS NOTES
a  [facility] Hospitalist Progress Note     PCP: None Pcp    Chief Complaint: follow-up      SUBJECTIVE:  Using 6L with ambulation. Expectorating yellow-brown sputum. Eating without problem. No leg swelling.     OBJECTIVE:  Temp:  [97.5 °F (36.4 °C)-98.6 °F (37 °C piperacillin-tazobactam  3.375 g Intravenous Q8H   • azithromycin  500 mg Intravenous Q24H   • Fluticasone Furoate-Vilanterol  1 puff Inhalation Daily   • Umeclidinium Bromide  1 puff Inhalation Daily   • ipratropium-albuterol  3 mL Nebulization QID   • as

## 2017-02-16 NOTE — PROGRESS NOTES
[facility]  Progress Note    Dean Tate Patient Status:  Inpatient    10/3/1932 MRN [id number]   [facility] 2NE-A Attending Isael Rodriguez MD   Hosp Day # 7 PCP None Pcp     Subjective:  Dean Tate is a(n) 80year old male Lance Harsh COPD with acute exacerbation (HCC)     COPD exacerbation (HCC)     Cardiac pacemaker in situ     Postural dizziness with presyncope     Syncopal episodes     Acute bronchitis     Acute bronchitis, unspecified organism     Hemoptysis     Renal insufficiency

## 2017-02-17 VITALS
SYSTOLIC BLOOD PRESSURE: 122 MMHG | BODY MASS INDEX: 24.75 KG/M2 | OXYGEN SATURATION: 98 % | RESPIRATION RATE: 18 BRPM | TEMPERATURE: 98 F | DIASTOLIC BLOOD PRESSURE: 62 MMHG | HEART RATE: 97 BPM | WEIGHT: 167.13 LBS | HEIGHT: 69 IN

## 2017-02-17 LAB
BASOPHILS # BLD AUTO: 0.01 X10(3) UL (ref 0–0.1)
BASOPHILS NFR BLD AUTO: 0.1 %
BUN BLD-MCNC: 30 MG/DL (ref 8–20)
CALCIUM BLD-MCNC: 8.5 MG/DL (ref 8.3–10.3)
CHLORIDE: 103 MMOL/L (ref 101–111)
CO2: 29 MMOL/L (ref 22–32)
CREAT BLD-MCNC: 1.89 MG/DL (ref 0.7–1.3)
EOSINOPHIL # BLD AUTO: 0 X10(3) UL (ref 0–0.3)
EOSINOPHIL NFR BLD AUTO: 0 %
ERYTHROCYTE [DISTWIDTH] IN BLOOD BY AUTOMATED COUNT: 14.1 % (ref 11.5–16)
GLUCOSE BLD-MCNC: 162 MG/DL (ref 70–99)
HCT VFR BLD AUTO: 33.7 % (ref 37–53)
HGB BLD-MCNC: 11.1 G/DL (ref 13–17)
IMMATURE GRANULOCYTE COUNT: 0.2 X10(3) UL (ref 0–1)
IMMATURE GRANULOCYTE RATIO %: 1.9 %
LYMPHOCYTES # BLD AUTO: 0.57 X10(3) UL (ref 0.9–4)
LYMPHOCYTES NFR BLD AUTO: 5.4 %
MCH RBC QN AUTO: 31 PG (ref 27–33.2)
MCHC RBC AUTO-ENTMCNC: 32.9 G/DL (ref 31–37)
MCV RBC AUTO: 94.1 FL (ref 80–99)
MONOCYTES # BLD AUTO: 0.58 X10(3) UL (ref 0.1–0.6)
MONOCYTES NFR BLD AUTO: 5.5 %
NEUTROPHIL ABS PRELIM: 9.14 X10 (3) UL (ref 1.3–6.7)
NEUTROPHILS # BLD AUTO: 9.14 X10(3) UL (ref 1.3–6.7)
NEUTROPHILS NFR BLD AUTO: 87.1 %
PLATELET # BLD AUTO: 235 10(3)UL (ref 150–450)
POTASSIUM SERPL-SCNC: 4.5 MMOL/L (ref 3.6–5.1)
RBC # BLD AUTO: 3.58 X10(6)UL (ref 3.8–5.8)
RED CELL DISTRIBUTION WIDTH-SD: 48.8 FL (ref 35.1–46.3)
SODIUM SERPL-SCNC: 140 MMOL/L (ref 136–144)
WBC # BLD AUTO: 10.5 X10(3) UL (ref 4–13)

## 2017-02-17 PROCEDURE — 85025 COMPLETE CBC W/AUTO DIFF WBC: CPT | Performed by: INTERNAL MEDICINE

## 2017-02-17 PROCEDURE — 94640 AIRWAY INHALATION TREATMENT: CPT

## 2017-02-17 PROCEDURE — 80048 BASIC METABOLIC PNL TOTAL CA: CPT | Performed by: INTERNAL MEDICINE

## 2017-02-17 NOTE — PLAN OF CARE
Remains on 3L at rest tonight. Saturations in high 90s. Denies any SOB. Cough is non-productive tonight. Has been using flutter valve. IS up tp 2200. Was restarted on IV solumedrol today.   Lung sounds mostly diminished without any wheezing or crackle

## 2017-02-17 NOTE — DISCHARGE SUMMARY
[facility] Dis[facility] Summary    Patient ID:  Krishna Person  [id number]  54 year old  10/3/1932    Admit date: 2/9/2017  Discharge date and time: 2/17/17  Attending Physician: Marko Pacheco MD  Primary Care Physician: None Pcp     Admit Dx: Re clear  CV: RRR, +s1/s2  Lungs: CTAB, good respiratory effort  Abdomen: s/nt/nd  Ext: Moves all 4 extremities, no c/c/e  Neuro: CN Intact, no focal deficits    Discharge meds     Medication List      START taking these medications          clotrimazole 10 M obtained. PATIENT STATED HISTORY:  Cough. Pneumonia    FINDINGS:  LUNGS:  Cardiac silhouette and pulmonary vasculature within normal limits. COPD with pleural-parenchymal scarring, stable. No new focal consolidation, pleural effusion, or pneumothorax.  Lef on 2/10/2017 at 2:30     Approved by: Miguel Groves MD            Xr Chest Ap Portable  (cpt=71010)    2/9/2017  PROCEDURE:  XR CHEST AP PORTABLE (CPT=71010)  TECHNIQUE:  AP chest radiograph was obtained.   COMPARISON:  [facility] , XR CHEST PA + LAT CHEST (

## 2017-02-17 NOTE — PROGRESS NOTES
[facility] Progress Note     SUBJECTIVE/24H Events: All events, procedures, notes reviewed. Doing well      Review of Systems:   A comprehensive 14 point review of systems was completed.    Pertin hours.    Invalid input(s): [id number]    Other Labs: Interval Culture Data:   No results found for this visit on 02/09/17.   No results for input(s): Rainer Remy, [address] [facility], [address], [address], [address], [address] [facility], [address] [place] [address], [facility] JJ Billy

## 2017-02-17 NOTE — PROGRESS NOTES
Room air at rest 86%.   O2 at 3 liters nasal cannula at rest 96%  O2 at 4 liters nasal cannula with ambulation 85%  O2 5liter nasal cannula with ambulation 85%  O2 6liter nasal cannula with ambulation 94%

## 2017-02-17 NOTE — CM/SW NOTE
Pt is now requiring 3 liters at rest and 6 liters with ambulation. CM spoke with [employer] liaison regarding current amount of O2 pt is on at home. Confirmed pt is only on O2 at 3 liters, if pt is requiring more will need new MD order and new O2 sats.  Nurse amie

## 2017-02-20 NOTE — CM/SW NOTE
02/20/17 0800   Discharge disposition   Discharged to: Home or [address] [place] [address] after discharge None   Patient assessed for rehabilitation services?  Yes   Discharge transportation Private car   Patient discharged 2/17/17

## 2017-02-25 ENCOUNTER — TELEPHONE (OUTPATIENT)
Dept: RESPIRATORY THERAPY | Facility: HOSPITAL | Age: 82
End: 2017-02-25

## 2017-03-16 ENCOUNTER — HISTORICAL (OUTPATIENT)
Dept: ADMINISTRATIVE | Facility: HOSPITAL | Age: 82
End: 2017-03-16

## 2017-03-24 PROBLEM — J44.9 CHRONIC OBSTRUCTIVE PULMONARY DISEASE, UNSPECIFIED COPD TYPE (HCC): Status: ACTIVE | Noted: 2017-03-24

## 2017-07-24 ENCOUNTER — APPOINTMENT (OUTPATIENT)
Dept: GENERAL RADIOLOGY | Facility: HOSPITAL | Age: 82
DRG: 191 | End: 2017-07-24
Attending: EMERGENCY MEDICINE
Payer: MEDICARE

## 2017-07-24 ENCOUNTER — HOSPITAL ENCOUNTER (INPATIENT)
Facility: HOSPITAL | Age: 82
LOS: 6 days | Discharge: HOME | DRG: 191 | End: 2017-07-30
Attending: EMERGENCY MEDICINE | Admitting: INTERNAL MEDICINE
Payer: MEDICARE

## 2017-07-24 DIAGNOSIS — N28.9 RENAL INSUFFICIENCY: ICD-10-CM

## 2017-07-24 DIAGNOSIS — J20.9 ACUTE BRONCHITIS, UNSPECIFIED ORGANISM: ICD-10-CM

## 2017-07-24 DIAGNOSIS — J44.1 COPD EXACERBATION (HCC): Primary | ICD-10-CM

## 2017-07-24 LAB
ALBUMIN SERPL-MCNC: 3.4 G/DL (ref 3.5–4.8)
ALP LIVER SERPL-CCNC: 78 U/L (ref 45–117)
ALT SERPL-CCNC: 20 U/L (ref 17–63)
APTT PPP: 29.9 SECONDS (ref 25–34)
AST SERPL-CCNC: 16 U/L (ref 15–41)
BASOPHILS # BLD AUTO: 0.06 X10(3) UL (ref 0–0.1)
BASOPHILS NFR BLD AUTO: 0.5 %
BILIRUB SERPL-MCNC: 1.2 MG/DL (ref 0.1–2)
BILIRUB UR QL STRIP.AUTO: NEGATIVE
BUN BLD-MCNC: 22 MG/DL (ref 8–20)
CALCIUM BLD-MCNC: 9 MG/DL (ref 8.3–10.3)
CHLORIDE: 105 MMOL/L (ref 101–111)
CLARITY UR REFRACT.AUTO: CLEAR
CO2: 27 MMOL/L (ref 22–32)
COLOR UR AUTO: YELLOW
CREAT BLD-MCNC: 1.56 MG/DL (ref 0.7–1.3)
EOSINOPHIL # BLD AUTO: 0.09 X10(3) UL (ref 0–0.3)
EOSINOPHIL NFR BLD AUTO: 0.7 %
ERYTHROCYTE [DISTWIDTH] IN BLOOD BY AUTOMATED COUNT: 13.9 % (ref 11.5–16)
GLUCOSE BLD-MCNC: 91 MG/DL (ref 70–99)
GLUCOSE UR STRIP.AUTO-MCNC: >=500 MG/DL
HCT VFR BLD AUTO: 39.8 % (ref 37–53)
HGB BLD-MCNC: 13.2 G/DL (ref 13–17)
IMMATURE GRANULOCYTE COUNT: 0.05 X10(3) UL (ref 0–1)
IMMATURE GRANULOCYTE RATIO %: 0.4 %
INR BLD: 1.03 (ref 0.89–1.11)
KETONES UR STRIP.AUTO-MCNC: NEGATIVE MG/DL
LEUKOCYTE ESTERASE UR QL STRIP.AUTO: NEGATIVE
LYMPHOCYTES # BLD AUTO: 1.1 X10(3) UL (ref 0.9–4)
LYMPHOCYTES NFR BLD AUTO: 8.9 %
M PROTEIN MFR SERPL ELPH: 7.4 G/DL (ref 6.1–8.3)
MCH RBC QN AUTO: 30.1 PG (ref 27–33.2)
MCHC RBC AUTO-ENTMCNC: 33.2 G/DL (ref 31–37)
MCV RBC AUTO: 90.7 FL (ref 80–99)
MONOCYTES # BLD AUTO: 1.27 X10(3) UL (ref 0.1–0.6)
MONOCYTES NFR BLD AUTO: 10.3 %
NEUTROPHIL ABS PRELIM: 9.74 X10 (3) UL (ref 1.3–6.7)
NEUTROPHILS # BLD AUTO: 9.74 X10(3) UL (ref 1.3–6.7)
NEUTROPHILS NFR BLD AUTO: 79.2 %
NITRITE UR QL STRIP.AUTO: NEGATIVE
PH UR STRIP.AUTO: 6 [PH] (ref 4.5–8)
PLATELET # BLD AUTO: 248 10(3)UL (ref 150–450)
POTASSIUM SERPL-SCNC: 3.9 MMOL/L (ref 3.6–5.1)
PRO-BETA NATRIURETIC PEPTIDE: 1014 PG/ML (ref ?–450)
PROCALCITONIN SERPL-MCNC: <0.11 NG/ML (ref ?–0.11)
PROT UR STRIP.AUTO-MCNC: NEGATIVE MG/DL
PSA SERPL DL<=0.01 NG/ML-MCNC: 13.5 SECONDS (ref 12–14.3)
RBC # BLD AUTO: 4.39 X10(6)UL (ref 3.8–5.8)
RED CELL DISTRIBUTION WIDTH-SD: 46.2 FL (ref 35.1–46.3)
RESPIRATORY PANEL NEG:: NEGATIVE
SODIUM SERPL-SCNC: 139 MMOL/L (ref 136–144)
SP GR UR STRIP.AUTO: 1.01 (ref 1–1.03)
TROPONIN: <0.046 NG/ML (ref ?–0.05)
UROBILINOGEN UR STRIP.AUTO-MCNC: <2 MG/DL
WBC # BLD AUTO: 12.3 X10(3) UL (ref 4–13)

## 2017-07-24 PROCEDURE — 71010 XR CHEST AP PORTABLE  (CPT=71010): CPT | Performed by: EMERGENCY MEDICINE

## 2017-07-24 PROCEDURE — 99223 1ST HOSP IP/OBS HIGH 75: CPT | Performed by: INTERNAL MEDICINE

## 2017-07-24 RX ORDER — IPRATROPIUM BROMIDE AND ALBUTEROL SULFATE 2.5; .5 MG/3ML; MG/3ML
3 SOLUTION RESPIRATORY (INHALATION) 2 TIMES DAILY
Status: DISCONTINUED | OUTPATIENT
Start: 2017-07-24 | End: 2017-07-24

## 2017-07-24 RX ORDER — METHYLPREDNISOLONE SODIUM SUCCINATE 40 MG/ML
80 INJECTION, POWDER, LYOPHILIZED, FOR SOLUTION INTRAMUSCULAR; INTRAVENOUS EVERY 8 HOURS
Status: DISCONTINUED | OUTPATIENT
Start: 2017-07-24 | End: 2017-07-26

## 2017-07-24 RX ORDER — IPRATROPIUM BROMIDE AND ALBUTEROL SULFATE 2.5; .5 MG/3ML; MG/3ML
3 SOLUTION RESPIRATORY (INHALATION)
Status: DISCONTINUED | OUTPATIENT
Start: 2017-07-24 | End: 2017-07-24

## 2017-07-24 RX ORDER — ACETAMINOPHEN 325 MG/1
650 TABLET ORAL EVERY 6 HOURS PRN
Status: DISCONTINUED | OUTPATIENT
Start: 2017-07-24 | End: 2017-07-30

## 2017-07-24 RX ORDER — IPRATROPIUM BROMIDE AND ALBUTEROL SULFATE 2.5; .5 MG/3ML; MG/3ML
3 SOLUTION RESPIRATORY (INHALATION) EVERY 4 HOURS
Status: DISCONTINUED | OUTPATIENT
Start: 2017-07-24 | End: 2017-07-25

## 2017-07-24 RX ORDER — HEPARIN SODIUM 5000 [USP'U]/ML
5000 INJECTION, SOLUTION INTRAVENOUS; SUBCUTANEOUS EVERY 12 HOURS SCHEDULED
Status: DISCONTINUED | OUTPATIENT
Start: 2017-07-24 | End: 2017-07-30

## 2017-07-24 RX ORDER — FUROSEMIDE 20 MG/1
20 TABLET ORAL
Status: DISCONTINUED | OUTPATIENT
Start: 2017-07-24 | End: 2017-07-30

## 2017-07-24 RX ORDER — HEPARIN SODIUM 5000 [USP'U]/ML
5000 INJECTION, SOLUTION INTRAVENOUS; SUBCUTANEOUS EVERY 8 HOURS SCHEDULED
Status: DISCONTINUED | OUTPATIENT
Start: 2017-07-24 | End: 2017-07-24

## 2017-07-24 RX ORDER — PANTOPRAZOLE SODIUM 40 MG/1
40 TABLET, DELAYED RELEASE ORAL
Status: DISCONTINUED | OUTPATIENT
Start: 2017-07-24 | End: 2017-07-30

## 2017-07-24 RX ORDER — PANTOPRAZOLE SODIUM 40 MG/1
40 TABLET, DELAYED RELEASE ORAL
Status: DISCONTINUED | OUTPATIENT
Start: 2017-07-25 | End: 2017-07-25

## 2017-07-24 RX ORDER — IPRATROPIUM BROMIDE AND ALBUTEROL SULFATE 2.5; .5 MG/3ML; MG/3ML
3 SOLUTION RESPIRATORY (INHALATION) EVERY 4 HOURS PRN
Status: DISCONTINUED | OUTPATIENT
Start: 2017-07-24 | End: 2017-07-30

## 2017-07-24 RX ORDER — METHYLPREDNISOLONE SODIUM SUCCINATE 125 MG/2ML
125 INJECTION, POWDER, LYOPHILIZED, FOR SOLUTION INTRAMUSCULAR; INTRAVENOUS ONCE
Status: COMPLETED | OUTPATIENT
Start: 2017-07-24 | End: 2017-07-24

## 2017-07-24 RX ORDER — SODIUM CHLORIDE 9 MG/ML
INJECTION, SOLUTION INTRAVENOUS CONTINUOUS
Status: DISCONTINUED | OUTPATIENT
Start: 2017-07-24 | End: 2017-07-25

## 2017-07-24 RX ORDER — ASPIRIN 81 MG/1
81 TABLET ORAL DAILY
Status: DISCONTINUED | OUTPATIENT
Start: 2017-07-24 | End: 2017-07-30

## 2017-07-24 NOTE — PLAN OF CARE
NURSING ADMISSION NOTE    Pt admit w/ COPD exacerbation w/ family at bedside. Pt has hx of COPD, uses 3L NC at home. Went to ER after having SOB/ STEWART, productive cough. Pt A&O, states feeling better since admission. Denies pain.   Pt on 3L NC w/ sats o

## 2017-07-24 NOTE — BH PROGRESS NOTE
Went to the unit to see the pt and spoke with his nurse, Olegario Appiah. He stated the pt did not need to be seen for depression. He said, the order will be d/c.

## 2017-07-24 NOTE — CONSULTS
[facility] Cardiology Consultation Ant Britton MD    The patient was interviewed, examined, the chart was reviewed and the consult was dictated. This is a 80year old male with a chief complaint of shortness of breath. Impression:  1. COPD with exacerbatio

## 2017-07-24 NOTE — ED INITIAL ASSESSMENT (HPI)
Pt states sob started a couple days ago, had a little cp with coughing, coughing up yellow phelgm, sometimes clear, no fever

## 2017-07-24 NOTE — CONSULTS
[facility]  Report of Consultation    Stormy Gracia Patient Status:  Emergency    10/3/1932 MRN [id number]   Location [address] [facility] Attending Gwendolyn Matthews MD   Hosp Day # 0 PCP None Pcp     Reason for Consultation:  Jamie Hernandez has a 40.00 pack-year smoking history. He has never used smokeless tobacco. He reports that he drinks alcohol. He reports that he does not use drugs.     Allergies:    Dye [Radiology Cont*        Comment:Throat swells    Medications:      Prior to Admission Soft, supple neck; trachea midline, no adenopathy, no thyromegaly. Lungs: decreased , very minimal ex wheezign    Chest wall: No tenderness or deformity. Heart: Regular rate and rhythm, normal S1S2, no murmur.    Abdomen: soft, non-tender, non-distended month, Outpatient pulmonologist - Dr García Heads. Last exacerbation Feb 2017  5. Hypoxemia  - baseline on 3l O2  6. CKD some improvement with waxing and waning  7. Heart Block s/p PPM  8.  Slightly elev BNP, ECHO 11/2016  LVEF 55%, RVSP 41       Plan   · Empiric

## 2017-07-24 NOTE — H&P
[facility]                                                               History & Physical         [facility] Patient Status:  Inpatient    10/3/1932 MRN [id number]   [facility] 2NE-A Attending Dot Whipple MD   [address] Day # 0 history:  Family History   Problem Relation Age of Onset   • Stroke Father    • Cancer Mother      Cancer - stomach   • Eye Problems Brother      Eye disorder   • Other[other] [OTHER] Brother      Hearing loss      Reviewed    Social history:   reports bandar Pertinent positives and negatives noted in the the HPI. Physical Exam:     Vital signs: Blood pressure 126/85, pulse 98, temperature 99.1 °F (37.3 °C), temperature source Oral, resp.  rate 20, height 5' 9\" (1.753 m), weight 171 lb 1.6 oz (77.6 kg), SpO2 this morning. He has some chest pain with coughing. He has a history of asthma, chronic obstructive pulmonary disease,   and pacemaker placement in 2011.           FINDINGS:  The heart size is normal. There is severe bolus emphysematous changes noted bilate inpatient services that will reasonably be expected to span two midnight's based on the clinical documentation in H+P. Based on patients current state of illness, I anticipate that, after discharge, patient will require TBD.       Page Mercer MD  7/24/2

## 2017-07-24 NOTE — ED PROVIDER NOTES
Patient Seen in: [facility] Emergency Department    History   Patient presents with:  Dyspnea APRIL SOB (respiratory)  Abdomen/Flank Pain (GI/)    Stated Complaint: SHORTNESS OF BREATH, ABDOMINAL CRAMPING, COPD FLARE UP    HPI    Patient has a histo MCG/INH Inhalation Aerosol Powder, Breath Activated,  Inhale 1 puff into the lungs daily. vitamin E 400 UNITS Oral Cap,  Take 1,000 Units by mouth daily. omega-3 fatty acids 1000 MG Oral Cap,  Take 1,000 mg by mouth daily.    Omeprazole 40 MG Oral Capsu appropriately. He is able speak in full clear sentences. He is not coughing here.   His pulse oximetry on nasal cannula is 100%   Eyes: sclera white, conjunctiva pink and moist.  Lids and lashes are normal.  Nose: Slightly congested without purulent nasal ---------                               -----------         ------                     CBC W/ DIFFERENTIAL[[id number]]          Abnormal            Final result                 Please view results for these tests on the individual orders.    Carley Escobar pneumonitis. Pacemaker leads are stable. There is a tortuous thoracic aorta. Biapical pleural thickening  CONCLUSION:  Severe bullous emphysematous changes with fibrosis.  Compared to the previous study increased interstitial changes in the lung bases and m

## 2017-07-25 ENCOUNTER — APPOINTMENT (OUTPATIENT)
Dept: ULTRASOUND IMAGING | Facility: HOSPITAL | Age: 82
DRG: 191 | End: 2017-07-25
Attending: INTERNAL MEDICINE
Payer: MEDICARE

## 2017-07-25 ENCOUNTER — APPOINTMENT (OUTPATIENT)
Dept: GENERAL RADIOLOGY | Facility: HOSPITAL | Age: 82
DRG: 191 | End: 2017-07-25
Attending: INTERNAL MEDICINE
Payer: MEDICARE

## 2017-07-25 ENCOUNTER — APPOINTMENT (OUTPATIENT)
Dept: NUCLEAR MEDICINE | Facility: HOSPITAL | Age: 82
DRG: 191 | End: 2017-07-25
Attending: INTERNAL MEDICINE
Payer: MEDICARE

## 2017-07-25 LAB
ATRIAL RATE: 87 BPM
BASOPHILS # BLD AUTO: 0 X10(3) UL (ref 0–0.1)
BASOPHILS NFR BLD AUTO: 0 %
BUN BLD-MCNC: 26 MG/DL (ref 8–20)
CALCIUM BLD-MCNC: 8.9 MG/DL (ref 8.3–10.3)
CHLORIDE: 104 MMOL/L (ref 101–111)
CO2: 24 MMOL/L (ref 22–32)
CREAT BLD-MCNC: 1.66 MG/DL (ref 0.7–1.3)
D-DIMER: 0.97 UG/ML FEU (ref 0–0.49)
EOSINOPHIL # BLD AUTO: 0 X10(3) UL (ref 0–0.3)
EOSINOPHIL NFR BLD AUTO: 0 %
ERYTHROCYTE [DISTWIDTH] IN BLOOD BY AUTOMATED COUNT: 14 % (ref 11.5–16)
GLUCOSE BLD-MCNC: 172 MG/DL (ref 70–99)
GLUCOSE BLD-MCNC: 227 MG/DL (ref 65–99)
HAV IGM SER QL: 2.3 MG/DL (ref 1.7–3)
HCT VFR BLD AUTO: 35.2 % (ref 37–53)
HGB BLD-MCNC: 11.7 G/DL (ref 13–17)
IMMATURE GRANULOCYTE COUNT: 0.09 X10(3) UL (ref 0–1)
IMMATURE GRANULOCYTE RATIO %: 0.7 %
LYMPHOCYTES # BLD AUTO: 0.55 X10(3) UL (ref 0.9–4)
LYMPHOCYTES NFR BLD AUTO: 4.5 %
MCH RBC QN AUTO: 30 PG (ref 27–33.2)
MCHC RBC AUTO-ENTMCNC: 33.2 G/DL (ref 31–37)
MCV RBC AUTO: 90.3 FL (ref 80–99)
MONOCYTES # BLD AUTO: 0.32 X10(3) UL (ref 0.1–0.6)
MONOCYTES NFR BLD AUTO: 2.6 %
NEUTROPHIL ABS PRELIM: 11.17 X10 (3) UL (ref 1.3–6.7)
NEUTROPHILS # BLD AUTO: 11.17 X10(3) UL (ref 1.3–6.7)
NEUTROPHILS NFR BLD AUTO: 92.2 %
P AXIS: 50 DEGREES
P-R INTERVAL: 162 MS
PHOSPHATE SERPL-MCNC: 3.6 MG/DL (ref 2.5–4.9)
PLATELET # BLD AUTO: 231 10(3)UL (ref 150–450)
POTASSIUM SERPL-SCNC: 4.6 MMOL/L (ref 3.6–5.1)
PROCALCITONIN SERPL-MCNC: <0.11 NG/ML (ref ?–0.11)
Q-T INTERVAL: 410 MS
QRS DURATION: 158 MS
QTC CALCULATION (BEZET): 493 MS
R AXIS: -79 DEGREES
RBC # BLD AUTO: 3.9 X10(6)UL (ref 3.8–5.8)
RED CELL DISTRIBUTION WIDTH-SD: 46.4 FL (ref 35.1–46.3)
SED RATE-ML: 19 MM/HR (ref 0–12)
SODIUM SERPL-SCNC: 138 MMOL/L (ref 136–144)
T AXIS: 84 DEGREES
VENTRICULAR RATE: 87 BPM
WBC # BLD AUTO: 12.1 X10(3) UL (ref 4–13)

## 2017-07-25 PROCEDURE — 99232 SBSQ HOSP IP/OBS MODERATE 35: CPT | Performed by: INTERNAL MEDICINE

## 2017-07-25 PROCEDURE — 93970 EXTREMITY STUDY: CPT | Performed by: INTERNAL MEDICINE

## 2017-07-25 PROCEDURE — 71020 XR CHEST PA + LAT CHEST (CPT=71020): CPT | Performed by: INTERNAL MEDICINE

## 2017-07-25 PROCEDURE — 78582 LUNG VENTILAT&PERFUS IMAGING: CPT | Performed by: INTERNAL MEDICINE

## 2017-07-25 RX ORDER — IPRATROPIUM BROMIDE AND ALBUTEROL SULFATE 2.5; .5 MG/3ML; MG/3ML
3 SOLUTION RESPIRATORY (INHALATION)
Status: DISCONTINUED | OUTPATIENT
Start: 2017-07-26 | End: 2017-07-30

## 2017-07-25 NOTE — CONSULTS
[facility] Pharmacy Note: Route Optimization for Pantoprazole (PROTONIX)    Patient is currently on Pantoprazole (PROTONIX) 40 mg IV/PO every 24 hours.    The patient meets the criteria to convert to the oral equivalent as established by the IV to Oral conversion

## 2017-07-25 NOTE — RESPIRATORY THERAPY NOTE
-Maintain nebs Q4  -Diminished BS bilaterally  -Dyspnea on exertion, usually through ambulation  -Nebs Changed to QID, patient does not want to be waken up at night.  Will call for PRN txs.  -Acapella BID

## 2017-07-25 NOTE — PROGRESS NOTES
[facility] Cardiology Progress Note - Franko Main Patient Status:  Inpatient    10/3/1932 MRN [id number]   Location [facility] 2NE-A Attending Mary Kay Marin MD   Hosp Day # 1 PCP None Pcp     Subjective:   The patient feels s and oriented x 3. No apparent distress. No respiratory or constitutional distress. HEENT: Normocephalic, anicteric sclera, neck supple, no thyromegaly or adenopathy. Neck: No JVD, carotids 2+, no bruits. Cardiac: Regular rate and rhythm.  S1, S2 normal. (Porcine) 5000 UNIT/ML injection 5,000 Units 5,000 Units Subcutaneous 2 times per day   acetaminophen (TYLENOL) tab 650 mg 650 mg Oral Q6H PRN   ipratropium-albuterol (DUONEB) nebulizer solution 3 mL 3 mL Nebulization Q4H   ipratropium-albuterol (DUONEB) n

## 2017-07-25 NOTE — PROGRESS NOTES
[facility]  Progress Note     Stormy Gracia Patient Status:  Inpatient    10/3/1932 MRN [id number]   [facility] 2NE-A Attending Siena Jeffery MD   Hosp Day # 1 PCP None Pcp     Chief Complaint: SOB    S: Patient reports STEWART with m Intravenous QAM AC    Or   • Pantoprazole Sodium  40 mg Oral QAM AC   • azithromycin  500 mg Intravenous Q24H   • Heparin Sodium (Porcine)  5,000 Units Subcutaneous 2 times per day   • ipratropium-albuterol  3 mL Nebulization Q4H   • aspirin  81 mg Oral Da color, texture, turgor normal. No rashes or lesions  Neurologic: Awake,alert,nonfocal  Psych: Mood and affect appears normal    A/P as above. COPD exacerbation–improving on IV steroids and nebulized treatments. Oxygen requirement improving.     Dr. Pedro Billy

## 2017-07-25 NOTE — PROGRESS NOTES
[facility]  Progress Note    Carter Hernandez Patient Status:  Inpatient    10/3/1932 MRN [id number]   [facility] 2NE-A Attending Ivett Geiger MD   [facility] Day # 1 PCP None Pcp     Subjective:  Carter Hernandez is a(n) 80year old male.   He 13.9  14.0   NEPRELIM  9.74*  11.17*   WBC  12.3  12.1   PLT  248.0  231.0     Recent Labs   Lab  07/24/17   0731  07/25/17   0534   GLU  91  172*   BUN  22*  26*   CREATSERUM  1.56*  1.66*   CA  9.0  8.9   NA  139  138   K  3.9  4.6   CL  105  104   CO2 condition in his lung serologically, I have ordered another VQ scan comp limited by venous Doppler and a d-dimer assay. It is likely the hemoptysis is a complication of pneumonitis and/or bronchitis-for which she is already on treatment.   We will continue

## 2017-07-25 NOTE — PLAN OF CARE
Pt AOx4. SOB @ rest/exertion. O2 sats 97% on 3 L NC. Pt slept in recliner. Nebs Q 4H; Solumedrol Q8H. V paced on tele. IV abx. Afebrile. Plan for CXR and labs in am. Pt updated on plan of care, will continue to monitor.

## 2017-07-25 NOTE — CM/SW NOTE
Attempted to meet with patient per COPD protocol order. Patient off the unit for testing. SW to follow up 7/26.

## 2017-07-25 NOTE — PLAN OF CARE
Pt admit w/ COPD exacerbation. Pt A&O, states feeling somewhat better today. Baseline of 3L NC in place, pt satting 99%- wears 3L of home O2. Pt does not want to be weaned down. Pt becomes very SOB w/ exertion, recovers quickly. Vpaced on tele.   Pt u

## 2017-07-26 LAB
GLUCOSE BLD-MCNC: 102 MG/DL (ref 65–99)
GLUCOSE BLD-MCNC: 143 MG/DL (ref 65–99)

## 2017-07-26 PROCEDURE — 99232 SBSQ HOSP IP/OBS MODERATE 35: CPT | Performed by: HOSPITALIST

## 2017-07-26 RX ORDER — DOCUSATE SODIUM 100 MG/1
100 CAPSULE, LIQUID FILLED ORAL 2 TIMES DAILY PRN
Status: DISCONTINUED | OUTPATIENT
Start: 2017-07-26 | End: 2017-07-27

## 2017-07-26 RX ORDER — METHYLPREDNISOLONE SODIUM SUCCINATE 40 MG/ML
60 INJECTION, POWDER, LYOPHILIZED, FOR SOLUTION INTRAMUSCULAR; INTRAVENOUS EVERY 12 HOURS
Status: COMPLETED | OUTPATIENT
Start: 2017-07-26 | End: 2017-07-28

## 2017-07-26 NOTE — PLAN OF CARE
RESPIRATORY - ADULT    • Achieves optimal ventilation and oxygenation Progressing        Patient is alert and oriented x4. V paced/NSR on tele. Oxygenation is 100% on 3 L NC. Patient L sounds are diminished with expiratory wheezes.  Patient denies chest jose juan

## 2017-07-26 NOTE — PROGRESS NOTES
[facility]  Progress Note    Lizzy Doherty Patient Status:  Inpatient    10/3/1932 MRN [id number]   [facility] 2NE-A Attending Neville Rasheed MD   [facility] Day # 2 PCP None Pcp     Subjective:  Lizzy Doherty is a(n) 80year old male.   H 90.3   MCH  30.1  30.0   MCHC  33.2  33.2   RDW  13.9  14.0   NEPRELIM  9.74*  11.17*   WBC  12.3  12.1   PLT  248.0  231.0     Recent Labs   Lab  07/24/17   0731  07/25/17   0534   GLU  91  172*   BUN  22*  26*   CREATSERUM  1.56*  1.66*   CA  9.0  8.9 Olimpia BARRAZA.  7/26/2017  9:23 AM

## 2017-07-26 NOTE — PLAN OF CARE
Assumed care of pt @ 2300. AOx4. On 3 L NC - STEWART. V paced on tele. Pt denies pain. Pt c/o SOB after using the bathroom. Neb Rx given with relief. Will continue to monitor.

## 2017-07-26 NOTE — PROGRESS NOTES
[facility] Cardiology Progress Note - Florian Arriaga Patient Status:  Inpatient    10/3/1932 MRN [id number]   [facility] 2NE-A Attending Nisha Couch MD   Hosp Day # 2 PCP None Pcp     Subjective:  Patient feels okay kg)  02/09/17 1258 : 161 lb 9.6 oz (73.3 kg)  02/09/17 0856 : 171 lb (77.6 kg)      Tele: NSR    Physical Exam:    General: Alert and oriented x 3. No apparent distress. No respiratory or constitutional distress.   HEENT: Normocephalic, anicteric sclera, ne azithromycin (ZITHROMAX) 500 mg in sodium chloride 0.9 % 250 mL IVPB add-vantage 500 mg Intravenous Q24H   guaiFENesin (ROBITUSSIN) 100mg/5ml LIQUID 100 mg 100 mg Oral Q4H PRN   Heparin Sodium (Porcine) 5000 UNIT/ML injection 5,000 Units 5,000 Units Subc

## 2017-07-26 NOTE — CM/SW NOTE
SW met with patient to assess for needs per COPD protocol. Patient identified he lives with his son in a one story home in [place] [place]. Their home has 7 stairs.   Patient reports full independence in ADL's prior to admission, noting his shortness of venita

## 2017-07-26 NOTE — PROGRESS NOTES
[facility]  Progress Note     Shobha Chao Patient Status:  Inpatient    10/3/1932 MRN [id number]   [facility] 2NE-A Attending Tony Shirley MD   Hosp Day # 2 PCP None Pcp     Chief Complaint: SOB    S: Patient reports still havi ipratropium-albuterol  3 mL Nebulization QID   • Pantoprazole Sodium  40 mg Oral QAM AC   • azithromycin  500 mg Intravenous Q24H   • Heparin Sodium (Porcine)  5,000 Units Subcutaneous 2 times per day   • aspirin  81 mg Oral Daily   • Fluticasone Furoate-V

## 2017-07-26 NOTE — PROGRESS NOTES
Assumed care at 1300. Pt alert and oriented. , patient saturating well on baseline of 3L O2. IVP solumedrol, IV antibiotics. Pt denies any pain at this time. Spoke with Donah Dance (psych liaison) and stated she will see patient tomorrow.  LS diminished with c

## 2017-07-27 LAB
ALLENS TEST: POSITIVE
ARTERIAL BLD GAS O2 SATURATION: 96 % (ref 92–100)
ARTERIAL BLOOD GAS BASE EXCESS: -1.6
ARTERIAL BLOOD GAS HCO3: 23.2 MEQ/L (ref 22–26)
ARTERIAL BLOOD GAS PCO2: 39 MM HG (ref 35–45)
ARTERIAL BLOOD GAS PH: 7.39 (ref 7.35–7.45)
ARTERIAL BLOOD GAS PO2: 99 MM HG (ref 80–105)
BASOPHILS # BLD AUTO: 0.02 X10(3) UL (ref 0–0.1)
BASOPHILS NFR BLD AUTO: 0.1 %
BUN BLD-MCNC: 48 MG/DL (ref 8–20)
CALCIUM BLD-MCNC: 8.4 MG/DL (ref 8.3–10.3)
CALCULATED O2 SATURATION: 98 % (ref 92–100)
CARBOXYHEMOGLOBIN: 0.9 % SAT (ref 0–3)
CHLORIDE: 103 MMOL/L (ref 101–111)
CO2: 27 MMOL/L (ref 22–32)
CREAT BLD-MCNC: 1.88 MG/DL (ref 0.7–1.3)
EOSINOPHIL # BLD AUTO: 0 X10(3) UL (ref 0–0.3)
EOSINOPHIL NFR BLD AUTO: 0 %
ERYTHROCYTE [DISTWIDTH] IN BLOOD BY AUTOMATED COUNT: 14.6 % (ref 11.5–16)
GLUCOSE BLD-MCNC: 110 MG/DL (ref 65–99)
GLUCOSE BLD-MCNC: 114 MG/DL (ref 70–99)
HAV IGM SER QL: 2.7 MG/DL (ref 1.7–3)
HCT VFR BLD AUTO: 35.3 % (ref 37–53)
HGB BLD-MCNC: 11.5 G/DL (ref 13–17)
IMMATURE GRANULOCYTE COUNT: 0.11 X10(3) UL (ref 0–1)
IMMATURE GRANULOCYTE RATIO %: 0.7 %
L/M: 3 L/MIN
LEGIONELLA PNEUMOPHILA AG, URI: NEGATIVE
LYMPHOCYTES # BLD AUTO: 0.63 X10(3) UL (ref 0.9–4)
LYMPHOCYTES NFR BLD AUTO: 4.2 %
MCH RBC QN AUTO: 29.7 PG (ref 27–33.2)
MCHC RBC AUTO-ENTMCNC: 32.6 G/DL (ref 31–37)
MCV RBC AUTO: 91.2 FL (ref 80–99)
METHEMOGLOBIN: 0.5 % SAT (ref 0.4–1.5)
MONOCYTES # BLD AUTO: 0.94 X10(3) UL (ref 0.1–0.6)
MONOCYTES NFR BLD AUTO: 6.2 %
NEUTROPHIL ABS PRELIM: 13.41 X10 (3) UL (ref 1.3–6.7)
NEUTROPHILS # BLD AUTO: 13.41 X10(3) UL (ref 1.3–6.7)
NEUTROPHILS NFR BLD AUTO: 88.8 %
PATIENT TEMPERATURE: 98.4 F
PLATELET # BLD AUTO: 246 10(3)UL (ref 150–450)
POTASSIUM SERPL-SCNC: 5 MMOL/L (ref 3.6–5.1)
RBC # BLD AUTO: 3.87 X10(6)UL (ref 3.8–5.8)
RED CELL DISTRIBUTION WIDTH-SD: 48.1 FL (ref 35.1–46.3)
SODIUM SERPL-SCNC: 137 MMOL/L (ref 136–144)
STREPTOCOCCUS PNEUMONIAE AG, U: NEGATIVE
TOTAL HEMOGLOBIN: 12.2 G/DL (ref 12.6–17.4)
WBC # BLD AUTO: 15.1 X10(3) UL (ref 4–13)

## 2017-07-27 PROCEDURE — 99232 SBSQ HOSP IP/OBS MODERATE 35: CPT | Performed by: HOSPITALIST

## 2017-07-27 RX ORDER — DOCUSATE SODIUM 100 MG/1
100 CAPSULE, LIQUID FILLED ORAL DAILY
Status: DISCONTINUED | OUTPATIENT
Start: 2017-07-27 | End: 2017-07-30

## 2017-07-27 NOTE — PLAN OF CARE
RESPIRATORY - ADULT    • Achieves optimal ventilation and oxygenation Progressing          HX: COPD, pacemaker, HTN, CKD, 3L home o2, lives with son    Assumed care of the patient at 2300. He is resting comfortable in the chair.  He came to ER with SOB and

## 2017-07-27 NOTE — PROGRESS NOTES
[facility]  Progress Note    Mo Howell Patient Status:  Inpatient    10/3/1932 MRN [id number]   [facility] 2NE-A Attending Rafaela Masters MD   [facility] Day # 3 PCP None Pcp     Subjective:  Mo Howell is a(n) 80year old male.   H 0542   GLU  172*  114*   BUN  26*  48*   CREATSERUM  1.66*  1.88*   CA  8.9  8.4   NA  138  137   K  4.6  5.0   CL  104  103   CO2  24.0  27.0     No results for input(s): ABGPHT, BFGKOR4J, NYFJL7M, ABGHCO3, ABGBE, TEMP, YUE, SITE, DEV, THGB in the last

## 2017-07-27 NOTE — PLAN OF CARE
Assumed care of pt @ 1900. AOx4. On 3 L NC - pt's baseline. SOB on exertion. Lungs diminished with exp wheezes posteriorly. Nebs PRN, IV solumedrol Q12H and IV abx Q24H. Pt denies pain. Will continue to monitor.

## 2017-07-27 NOTE — PROGRESS NOTES
[facility] Cardiology Progress Note - April Bentley Patient Status:  Inpatient    10/3/1932 MRN [id number]   [facility] 2NE-A Attending Santiago Kelly MD   Hosp Day # 3 PCP None Pcp     Subjective:  Patient feels abou apparent distress. No respiratory or constitutional distress. HEENT: Normocephalic, anicteric sclera, neck supple, no thyromegaly or adenopathy. Neck: No JVD, carotids 2+, no bruits. Cardiac: Regular rate and rhythm.  S1, S2 normal. No murmur, pericardia guaiFENesin (ROBITUSSIN) 100mg/5ml LIQUID 100 mg 100 mg Oral Q4H PRN   Heparin Sodium (Porcine) 5000 UNIT/ML injection 5,000 Units 5,000 Units Subcutaneous 2 times per day   acetaminophen (TYLENOL) tab 650 mg 650 mg Oral Q6H PRN   ipratropium-albuterol (

## 2017-07-27 NOTE — PROGRESS NOTES
[facility]  Progress Note     Staci Owen Patient Status:  Inpatient    10/3/1932 MRN [id number]   [facility] 2NE-A Attending Adam Jarvis MD   Hosp Day # 3 PCP None Pcp     Chief Complaint: SOB    S: Patient reports no new com Daily   • Fluticasone Furoate-Vilanterol  1 puff Inhalation Daily   • Umeclidinium Bromide  1 puff Inhalation Daily   • furosemide  20 mg Oral Daily   • Roflumilast   Oral QAM       ASSESSMENT / PLAN:     1.  Acute COPD exacerbation with possible mild inter

## 2017-07-27 NOTE — PLAN OF CARE
RESPIRATORY - ADULT    • Achieves optimal ventilation and oxygenation Progressing        **Pt saturating >92% on baseline 3L. Lung sounds mostly diminished, some slight expiratory wheezing. Pt alert and oriented this AM, IV Abx infusing.  IVP solu

## 2017-07-28 LAB
ALPHA-1-ANTITRYPSIN: 165 MG/DL
BUN BLD-MCNC: 47 MG/DL (ref 8–20)
CALCIUM BLD-MCNC: 8.4 MG/DL (ref 8.3–10.3)
CHLORIDE: 104 MMOL/L (ref 101–111)
CO2: 27 MMOL/L (ref 22–32)
CREAT BLD-MCNC: 1.83 MG/DL (ref 0.7–1.3)
GLUCOSE BLD-MCNC: 108 MG/DL (ref 70–99)
POTASSIUM SERPL-SCNC: 5.1 MMOL/L (ref 3.6–5.1)
SODIUM SERPL-SCNC: 139 MMOL/L (ref 136–144)

## 2017-07-28 PROCEDURE — 99232 SBSQ HOSP IP/OBS MODERATE 35: CPT | Performed by: HOSPITALIST

## 2017-07-28 RX ORDER — PREDNISONE 20 MG/1
40 TABLET ORAL
Status: DISCONTINUED | OUTPATIENT
Start: 2017-07-29 | End: 2017-07-30

## 2017-07-28 RX ORDER — BUMETANIDE 0.25 MG/ML
1 INJECTION, SOLUTION INTRAMUSCULAR; INTRAVENOUS ONCE
Status: COMPLETED | OUTPATIENT
Start: 2017-07-28 | End: 2017-07-28

## 2017-07-28 NOTE — PROGRESS NOTES
Patient went to wash up in bathroom, when finished, came back out to chair, markedly sob, o2 saturation 84% pn 5l nc. Patient took deep breath, o2 increased to 6l nc. Within 30 seconds, breathing clamed down and o2 sat 98%.  Nc placed back to 3l , o2 sat 94

## 2017-07-28 NOTE — PROGRESS NOTES
[facility] Cardiology Progress Note - Karlene Marte Patient Status:  Inpatient    10/3/1932 MRN [id number]   [facility] 2NE-A Attending Radha John MD   Hosp Day # 4 PCP None Pcp     Subjective:   The patient is fee oriented x 3. No apparent distress. No respiratory or constitutional distress. HEENT: Normocephalic, anicteric sclera, neck supple, no thyromegaly or adenopathy. Neck: No JVD, carotids 2+, no bruits. Cardiac: Regular rate and rhythm.  S1, S2 normal. No m IVPB add-vantage 500 mg Intravenous Q24H   guaiFENesin (ROBITUSSIN) 100mg/5ml LIQUID 100 mg 100 mg Oral Q4H PRN   Heparin Sodium (Porcine) 5000 UNIT/ML injection 5,000 Units 5,000 Units Subcutaneous 2 times per day   acetaminophen (TYLENOL) tab 650 mg 650

## 2017-07-28 NOTE — BH PROGRESS NOTE
Seen the pt yesterday and completed the level of care assessment. The pt admitted to occasional anxiety with panic attacks rarely. He refused any mental health treatment or referrals.   Went up to see the pt and gave him the handout on depression/anxiety

## 2017-07-28 NOTE — PROGRESS NOTES
[facility]  Progress Note    Freida Kay Patient Status:  Inpatient    10/3/1932 MRN [id number]   [facility] 2NE-A Attending Santiago Kelly MD   [facility] Day # 4 PCP None Pcp     Subjective:  Freida Kay emphysema. Mild pneumonia is suspected in the right midlung and in the bases, superimposed on chronic interstitial changes.  Mild progression in these areas relative to 7/24/17       Medications Reviewed:    Current Facility-Administered Medications:  docus Syncopal episodes     Acute bronchitis     Acute bronchitis, unspecified organism     Hemoptysis     Renal insufficiency     Chronic obstructive pulmonary disease, unspecified COPD type ([facility] 75.)        Assessment:    · AECOPD  · O2 Dependent COPD- baseline 3

## 2017-07-28 NOTE — PROGRESS NOTES
[facility]  Progress Note     Edna Lund Patient Status:  Inpatient    10/3/1932 MRN [id number]   [facility] 2NE-A Attending Reggie No MD   Hosp Day # 4 PCP None Pcp     Chief Complaint: SOB    S: Patient reports no new com Daily   • Fluticasone Furoate-Vilanterol  1 puff Inhalation Daily   • Umeclidinium Bromide  1 puff Inhalation Daily   • furosemide  20 mg Oral Daily   • Roflumilast   Oral QAM       ASSESSMENT / PLAN:     1.  Acute COPD exacerbation with possible mild inter

## 2017-07-28 NOTE — BH LEVEL OF CARE ASSESSMENT
Level of Care Assessment Note                     General Questions  Why are you here?: States he was having breathing problems. History of Present Illness: Pt denies any mental heatlh history.   He said, he has never seen a psychiatarist or therapist. Sleeps all night  Number of Sleep Hours: 7 Hours  Use of Sleep Aids: denies  Appetite Symptoms: Normal for patient  Unplanned Weight Loss: No  Unplanned Weight Gain: No  History of Eating Disorder: No  Active Eating Disorder: No                 Current/Pre Status  Appearance Characteristics: Appropriate clothing  Mood: Calm  Affect: Congruent  Eye Contact: Good  Level of Consciousness: Alert  Exhibited Behavior : Appropriate to situation  Gait/Movement: Steady  Speech Characteristics: Normal rate;Normal rhyt

## 2017-07-29 LAB
BUN BLD-MCNC: 47 MG/DL (ref 8–20)
CALCIUM BLD-MCNC: 8.9 MG/DL (ref 8.3–10.3)
CHLORIDE: 103 MMOL/L (ref 101–111)
CO2: 29 MMOL/L (ref 22–32)
CREAT BLD-MCNC: 1.72 MG/DL (ref 0.7–1.3)
GLUCOSE BLD-MCNC: 145 MG/DL (ref 70–99)
HAV IGM SER QL: 2.6 MG/DL (ref 1.7–3)
POTASSIUM SERPL-SCNC: 4.9 MMOL/L (ref 3.6–5.1)
SODIUM SERPL-SCNC: 139 MMOL/L (ref 136–144)

## 2017-07-29 PROCEDURE — 99232 SBSQ HOSP IP/OBS MODERATE 35: CPT | Performed by: HOSPITALIST

## 2017-07-29 NOTE — PROGRESS NOTES
[facility]  Progress Note     Gema Ochoa Patient Status:  Inpatient    10/3/1932 MRN [id number]   [facility] 2NE-A Attending Jarad Sloan MD   Hosp Day # 5 PCP None Pcp     Chief Complaint: SOB    S: feeling better today,  Up Daily   • Umeclidinium Bromide  1 puff Inhalation Daily   • furosemide  20 mg Oral Daily   • Roflumilast   Oral QAM       ASSESSMENT / PLAN:     1. Acute COPD exacerbation with possible mild interstitial pneumonitis  1.  Day 4/7 of Rocephin and completed 5

## 2017-07-29 NOTE — PROGRESS NOTES
[facility] Cardiology Progress Note - Florian Nelsonarvin Patient Status:  Inpatient    10/3/1932 MRN [id number]   [facility] 2NE-A Attending Nisha Couch MD   Hosp Day # 5 PCP None Pcp     Subjective:  Still dyspneic,not apparent distress. No respiratory or constitutional distress. HEENT: Normocephalic, anicteric sclera, neck supple, no thyromegaly or adenopathy. Neck: No JVD, carotids 2+, no bruits. Cardiac: Regular rate and rhythm.  S1, S2 normal. No murmur, pericardia (ROBITUSSIN) 100mg/5ml LIQUID 100 mg 100 mg Oral Q4H PRN   Heparin Sodium (Porcine) 5000 UNIT/ML injection 5,000 Units 5,000 Units Subcutaneous 2 times per day   acetaminophen (TYLENOL) tab 650 mg 650 mg Oral Q6H PRN   ipratropium-albuterol (DUONEB) nebuli

## 2017-07-29 NOTE — PROGRESS NOTES
[facility]  Progress Note    Jerrell Siddiqi Patient Status:  Inpatient    10/3/1932 MRN [id number]   [facility] 2NE-A Attending Renita Briseno MD   Hosp Day # 5 PCP None Pcp     Assessment:     · AECOPD  · O2 Dependent COPD- shandrai 760 ml   Output             1350 ml   Net             -590 ml     Wt Readings from Last 6 Encounters:  07/29/17 : 171 lb 4.8 oz (77.7 kg)  03/24/17 : 167 lb (75.8 kg)  02/17/17 : 167 lb 1.6 oz (75.8 kg)  11/08/16 : 159 lb 9.8 oz (72.4 kg)  08/25/16 : 16 IVPB add-vantage 500 mg Intravenous Q24H   guaiFENesin (ROBITUSSIN) 100mg/5ml LIQUID 100 mg 100 mg Oral Q4H PRN   Heparin Sodium (Porcine) 5000 UNIT/ML injection 5,000 Units 5,000 Units Subcutaneous 2 times per day   acetaminophen (TYLENOL) tab 650 mg 650

## 2017-07-30 VITALS
HEART RATE: 91 BPM | WEIGHT: 171.75 LBS | SYSTOLIC BLOOD PRESSURE: 137 MMHG | BODY MASS INDEX: 25.44 KG/M2 | DIASTOLIC BLOOD PRESSURE: 66 MMHG | OXYGEN SATURATION: 98 % | RESPIRATION RATE: 18 BRPM | HEIGHT: 69 IN | TEMPERATURE: 98 F

## 2017-07-30 PROCEDURE — 99239 HOSP IP/OBS DSCHRG MGMT >30: CPT | Performed by: HOSPITALIST

## 2017-07-30 RX ORDER — PREDNISONE 10 MG/1
TABLET ORAL
Qty: 20 TABLET | Refills: 0 | Status: SHIPPED | OUTPATIENT
Start: 2017-07-30 | End: 2017-07-30

## 2017-07-30 RX ORDER — PREDNISONE 10 MG/1
TABLET ORAL
Qty: 20 TABLET | Refills: 0 | Status: ON HOLD | OUTPATIENT
Start: 2017-07-30 | End: 2017-10-06

## 2017-07-30 NOTE — PROGRESS NOTES
[facility] Cardiology Progress Note - Miguel Cosby Patient Status:  Inpatient    10/3/1932 MRN [id number]   [facility] 2NE-A Attending Lexie Sharam MD   Hosp Day # 6 PCP None Pcp     Subjective:  feels good, wants Exam:    General: Alert and oriented x 3. No apparent distress. No respiratory or constitutional distress. HEENT: Normocephalic, anicteric sclera, neck supple, no thyromegaly or adenopathy. Neck: No JVD, carotids 2+, no bruits.   Cardiac: Regular rate and guaiFENesin (ROBITUSSIN) 100mg/5ml LIQUID 100 mg 100 mg Oral Q4H PRN   Heparin Sodium (Porcine) 5000 UNIT/ML injection 5,000 Units 5,000 Units Subcutaneous 2 times per day   acetaminophen (TYLENOL) tab 650 mg 650 mg Oral Q6H PRN   ipratropium-albuterol (

## 2017-07-30 NOTE — PROGRESS NOTES
[facility]  Progress Note    [place] Patient Status:  Inpatient    10/3/1932 MRN [id number]   [facility] 2NE-A Attending William Aguayo MD   Hosp Day # 6 PCP None Pcp     Assessment:     · AECOPD  · O2 Dependent COPD- baseli Intake              700 ml   Output             1200 ml   Net             -500 ml     Wt Readings from Last 6 Encounters:  07/30/17 : 171 lb 11.8 oz (77.9 kg)  03/24/17 : 167 lb (75.8 kg)  02/17/17 : 167 lb 1.6 oz (75.8 kg)  11/08/16 : 159 lb 9.8 oz (72. Q4H PRN   Heparin Sodium (Porcine) 5000 UNIT/ML injection 5,000 Units 5,000 Units Subcutaneous 2 times per day   acetaminophen (TYLENOL) tab 650 mg 650 mg Oral Q6H PRN   ipratropium-albuterol (DUONEB) nebulizer solution 3 mL 3 mL Nebulization Q4H PRN   asp

## 2017-07-30 NOTE — PROGRESS NOTES
Explained discharge instructions including medications and follow ups to the patient, verbalize understanding, IV removed, tele monitor discontinued, will transported via wheelchair with oxygen.

## 2017-07-30 NOTE — PLAN OF CARE
Assumed patient care at 0730. Vital signs stable. Patient denies pain. Patient on 3L at rest - 94% on 6L ambulating- patient states he uses 5L with ambulation at home but our tanks only have even numbers.

## 2017-07-30 NOTE — PROGRESS NOTES
[facility]  Progress Note     August Dear Patient Status:  Inpatient    10/3/1932 MRN [id number]   [facility] 2NE-A Attending Elizabeth Hartmann MD   Hosp Day # 6 PCP None Pcp     Chief Complaint: SOB    S: feeling better , eager to Inhalation Daily   • furosemide  20 mg Oral Daily   • Roflumilast   Oral QAM       ASSESSMENT / PLAN:     1. Acute COPD exacerbation with possible mild interstitial pneumonitis  1.  Day 5/7 of Rocephin and completed 5 days of Azithromycin IV  2. GPR/GPC on

## 2017-07-31 NOTE — DISCHARGE SUMMARY
[facility]PITAL[facility]  DISCHARGE SUMMARY     Mo Howell Patient Status:  Inpatient    10/3/1932 MRN [id number]   [facility] 2NE-A Attending No att. providers found   Hosp Day # 6 PCP None Pcp     Date of Admission: 2017  Date of Disch hypertension. Patient uses 3 L of oxygen at home at baseline and follows up with pulmonary Dr. Te Galloway.          Brief Synopsis: Patient was admitted with shortness of breath.   He is found to have acute COPD exacerbation with possible mild interstitial pneu Aepb  Commonly known as:  BREO ELLIPTA      Inhale 1 puff into the lungs daily.    Quantity:  1 each  Refills:  6     furosemide 20 MG Tabs  Commonly known as:  LASIX      TAKE 1 TABLET BY MOUTH EVERY DAY   Quantity:  30 tablet  Refills:  11     GLUCOS-JL General: No acute distress. Respiratory: Clear to auscultation bilaterally. No wheezes. No rhonchi. Cardiovascular: S1, S2. Regular rate and rhythm. No murmurs, rubs or gallops. Abdomen: Soft, nontender, nondistended. Positive bowel sounds.  No rebo

## 2017-09-19 ENCOUNTER — APPOINTMENT (OUTPATIENT)
Dept: GENERAL RADIOLOGY | Facility: HOSPITAL | Age: 82
DRG: 190 | End: 2017-09-19
Attending: EMERGENCY MEDICINE
Payer: MEDICARE

## 2017-09-19 ENCOUNTER — HOSPITAL ENCOUNTER (INPATIENT)
Facility: HOSPITAL | Age: 82
LOS: 23 days | Discharge: HOME HEALTH | DRG: 190 | End: 2017-10-13
Attending: EMERGENCY MEDICINE | Admitting: INTERNAL MEDICINE
Payer: MEDICARE

## 2017-09-19 DIAGNOSIS — R06.00 DYSPNEA ON EXERTION: ICD-10-CM

## 2017-09-19 DIAGNOSIS — J44.9 CHRONIC OBSTRUCTIVE PULMONARY DISEASE, UNSPECIFIED COPD TYPE (HCC): Primary | ICD-10-CM

## 2017-09-19 DIAGNOSIS — B34.9 VIRAL SYNDROME: ICD-10-CM

## 2017-09-19 PROCEDURE — 71010 XR CHEST AP PORTABLE  (CPT=71010): CPT | Performed by: EMERGENCY MEDICINE

## 2017-09-19 RX ORDER — METHYLPREDNISOLONE SODIUM SUCCINATE 40 MG/ML
80 INJECTION, POWDER, LYOPHILIZED, FOR SOLUTION INTRAMUSCULAR; INTRAVENOUS EVERY 8 HOURS
Status: DISCONTINUED | OUTPATIENT
Start: 2017-09-19 | End: 2017-09-20

## 2017-09-19 RX ORDER — IPRATROPIUM BROMIDE AND ALBUTEROL SULFATE 2.5; .5 MG/3ML; MG/3ML
3 SOLUTION RESPIRATORY (INHALATION) ONCE
Status: COMPLETED | OUTPATIENT
Start: 2017-09-19 | End: 2017-09-19

## 2017-09-19 RX ORDER — METHYLPREDNISOLONE SODIUM SUCCINATE 125 MG/2ML
125 INJECTION, POWDER, LYOPHILIZED, FOR SOLUTION INTRAMUSCULAR; INTRAVENOUS ONCE
Status: COMPLETED | OUTPATIENT
Start: 2017-09-19 | End: 2017-09-19

## 2017-09-19 RX ORDER — METHYLPREDNISOLONE SODIUM SUCCINATE 40 MG/ML
60 INJECTION, POWDER, LYOPHILIZED, FOR SOLUTION INTRAMUSCULAR; INTRAVENOUS EVERY 8 HOURS
Status: DISCONTINUED | OUTPATIENT
Start: 2017-09-19 | End: 2017-09-19 | Stop reason: DRUGHIGH

## 2017-09-19 RX ORDER — ASPIRIN 81 MG/1
81 TABLET ORAL DAILY
Status: DISCONTINUED | OUTPATIENT
Start: 2017-09-20 | End: 2017-10-13

## 2017-09-19 RX ORDER — IPRATROPIUM BROMIDE AND ALBUTEROL SULFATE 2.5; .5 MG/3ML; MG/3ML
3 SOLUTION RESPIRATORY (INHALATION) EVERY 4 HOURS
Status: DISCONTINUED | OUTPATIENT
Start: 2017-09-19 | End: 2017-10-13

## 2017-09-19 RX ORDER — PANTOPRAZOLE SODIUM 40 MG/1
40 TABLET, DELAYED RELEASE ORAL
Status: DISCONTINUED | OUTPATIENT
Start: 2017-09-20 | End: 2017-10-03

## 2017-09-19 RX ORDER — HEPARIN SODIUM 5000 [USP'U]/ML
5000 INJECTION, SOLUTION INTRAVENOUS; SUBCUTANEOUS EVERY 8 HOURS SCHEDULED
Status: DISCONTINUED | OUTPATIENT
Start: 2017-09-19 | End: 2017-10-07

## 2017-09-19 RX ORDER — ONDANSETRON 2 MG/ML
4 INJECTION INTRAMUSCULAR; INTRAVENOUS EVERY 6 HOURS PRN
Status: DISCONTINUED | OUTPATIENT
Start: 2017-09-19 | End: 2017-10-13

## 2017-09-19 RX ORDER — MULTIPLE VITAMINS W/ MINERALS TAB 9MG-400MCG
1 TAB ORAL DAILY
Status: DISCONTINUED | OUTPATIENT
Start: 2017-09-20 | End: 2017-10-13

## 2017-09-19 RX ORDER — ECHINACEA PURPUREA EXTRACT 125 MG
2 TABLET ORAL EVERY 10 MIN PRN
Status: DISCONTINUED | OUTPATIENT
Start: 2017-09-19 | End: 2017-09-30

## 2017-09-19 RX ORDER — FUROSEMIDE 20 MG/1
20 TABLET ORAL
Status: DISCONTINUED | OUTPATIENT
Start: 2017-09-20 | End: 2017-10-02

## 2017-09-19 RX ORDER — IPRATROPIUM BROMIDE AND ALBUTEROL SULFATE 2.5; .5 MG/3ML; MG/3ML
3 SOLUTION RESPIRATORY (INHALATION)
Status: DISCONTINUED | OUTPATIENT
Start: 2017-09-19 | End: 2017-09-19

## 2017-09-19 RX ORDER — ACETAMINOPHEN 325 MG/1
650 TABLET ORAL EVERY 6 HOURS PRN
Status: DISCONTINUED | OUTPATIENT
Start: 2017-09-19 | End: 2017-10-13

## 2017-09-19 NOTE — ED INITIAL ASSESSMENT (HPI)
Patient presents with increasing APRIL with exertion that has been progressing over the last week. He is on home oxygen at 3l per nasal cannula for copd. He has also had a productive yellow cough. Denies fever.

## 2017-09-19 NOTE — ED NOTES
Pt up to bedside in order to urinate per urinal and c/o increase in dyspnea with exertion. MD at bedside.

## 2017-09-19 NOTE — ED NOTES
Attempt made to give report to Monica Francis RN on floor, but line busy. To call back shortly at ext [phone].

## 2017-09-19 NOTE — PLAN OF CARE
NURSING ADMISSION NOTE      Patient admitted via Cart  Oriented to room. Safety precautions initiated. Bed in low position. Call light in reach. Admission complete  Lungs clear/diminished. Sob with exertion .  States he sleeps in the recliner

## 2017-09-19 NOTE — ED NOTES
Pt awake and alert, appears comfortable, but resps appears slightly dyspneic. Pt with + exp wheezing bilaterally.

## 2017-09-20 ENCOUNTER — APPOINTMENT (OUTPATIENT)
Dept: GENERAL RADIOLOGY | Facility: HOSPITAL | Age: 82
DRG: 190 | End: 2017-09-20
Attending: INTERNAL MEDICINE
Payer: MEDICARE

## 2017-09-20 PROCEDURE — 71010 XR CHEST AP PORTABLE  (CPT=71010): CPT | Performed by: INTERNAL MEDICINE

## 2017-09-20 RX ORDER — METHYLPREDNISOLONE SODIUM SUCCINATE 40 MG/ML
60 INJECTION, POWDER, LYOPHILIZED, FOR SOLUTION INTRAMUSCULAR; INTRAVENOUS EVERY 8 HOURS
Status: DISCONTINUED | OUTPATIENT
Start: 2017-09-20 | End: 2017-09-21

## 2017-09-20 NOTE — PROGRESS NOTES
[facility] Hospitalist Progress Note                                                                   [address] [facility]  10/3/1932    SUBJECTIVE: pt doing a bit better but still very SOB with short d Nasal Spray, CEPACOL    Assessment/Plan:  Principal Problem:    Chronic obstructive pulmonary disease, unspecified COPD type (HCC)  Active Problems:    Viral syndrome    Dyspnea on exertion     Patient is a 80year old male with PMH sig for COPD, gerd, HTN

## 2017-09-20 NOTE — ED PROVIDER NOTES
Patient Seen in: [facility] 5nw-a    History   Patient presents with:  Dyspnea APRIL SOB (respiratory)    Stated Complaint: APRIL    HPI    69-year-old male with COPD who comes emergency department with worsening shortness of breath over the past severa HPI.    Physical Exam   ED Triage Vitals [09/19/17 1412]  BP: 131/71  Pulse: 87  Resp: 18  Temp: 98.7 °F (37.1 °C)  Temp src: Temporal  SpO2: 95 %  O2 Device: None (Room air)    Current:/73 (BP Location: Left arm)   Pulse 96   Temp 97.9 °F (36.6 °C) individual orders.    CBC WITH DIFFERENTIAL WITH PLATELET   BASIC METABOLIC PANEL (8)   MAGNESIUM   PHOSPHORUS   RAINBOW DRAW BLUE   RAINBOW DRAW LAVENDER   RAINBOW DRAW LIGHT GREEN   RAINBOW DRAW GOLD   SPUTUM CULTURE   RESPIRATORY PANEL FLU EXPANDED     E

## 2017-09-20 NOTE — H&P
[facility] Hospitalist History and Physical      Patient presents with:  Dyspnea APRIL SOB (respiratory)       PCP: None Pcp      History of Present Illness: Patient is a 80year old male with PMH sig for COPD, gerd, HTN, anemia, and CKD 3 who presents to ER with S sore throat or runny nose. CARDIOVASCULAR:  No chest pain  RESPIRATORY:  + cough,+shortness of breath, no PND or orthopnea. GASTROINTESTINAL:  No nausea, vomiting or diarrhea. GENITOURINARY:  No dysuria, frequency or urgency.   MUSCULOSKELETAL:  No arthr (cpt=71010)    Result Date: 9/19/2017  PROCEDURE:  XR CHEST AP PORTABLE (CPT=71010)  TECHNIQUE:  AP chest radiograph was obtained. COMPARISON:  [facility] , XR CHEST PA + LAT CHEST (CPT=71020), 7/25/2017, 7:42.   [facility] , XR CHEST AP PORTABLE  (CPT=71010), 7/2

## 2017-09-20 NOTE — CONSULTS
[facility]  Report of Consultation    Carter Hernandez Patient Status:  Observation    10/3/1932 MRN [id number]   [facility] 5NW-A Attending Lory Holloway DO   Hosp Day # 0 PCP None Pcp     Reason for Consultation:  aecopd    Histo reports that he quit smoking about 27 years ago. His smoking use included Cigarettes. He has a 40.00 pack-year smoking history. He has never used smokeless tobacco. He reports that he drinks alcohol. He reports that he does not use drugs.     Allergies: conjunctival     hemorrhage. Nose: Nares normal.   Throat: Lips, mucosa, and tongue normal.  No thrush noted. Neck: Soft, supple neck; trachea midline, no adenopathy, no thyromegaly. Lungs: diminsedhed    Chest wall: No tenderness or deformity.    Hea hours.    Invalid input(s): TKT52ALT, CHOBYRON      Cultures:       Radiology:  Reviewed  Personally       ASSESSMENT        · AECOPD, negative viral panel , no PNA on CXR   · O2 Dependent COPD- baseline 3 liters @ rest, on prednisone 5 mg mireille,  A1AT phenoty

## 2017-09-20 NOTE — CM/SW NOTE
09/20/17 1500   CM/SW Referral Data   Referral Source Physician;Social Work (self-referral)   Reason for Referral Discharge planning;Protocol order set   Specify order set COPD   Informant Patient   Patient Info   Patient's Mental Status Alert;Oriented

## 2017-09-20 NOTE — PROGRESS NOTES
09/20/17 7667   Clinical Encounter Type   Visited With Patient   Continue Visiting No   Referral From Patient   Referral To    Yarsanism Encounters   Yarsanism Needs Prayer   Patient Spiritual Encounters   Spiritual Interventions  and pt

## 2017-09-21 ENCOUNTER — APPOINTMENT (OUTPATIENT)
Dept: GENERAL RADIOLOGY | Facility: HOSPITAL | Age: 82
DRG: 190 | End: 2017-09-21
Attending: INTERNAL MEDICINE
Payer: MEDICARE

## 2017-09-21 PROBLEM — J44.9 CHRONIC OBSTRUCTIVE PULMONARY DISEASE (HCC): Status: ACTIVE | Noted: 2017-03-24

## 2017-09-21 PROCEDURE — 71020 XR CHEST PA + LAT CHEST (CPT=71020): CPT | Performed by: INTERNAL MEDICINE

## 2017-09-21 RX ORDER — METHYLPREDNISOLONE SODIUM SUCCINATE 40 MG/ML
60 INJECTION, POWDER, LYOPHILIZED, FOR SOLUTION INTRAMUSCULAR; INTRAVENOUS EVERY 12 HOURS
Status: DISCONTINUED | OUTPATIENT
Start: 2017-09-21 | End: 2017-09-24

## 2017-09-21 RX ORDER — FUROSEMIDE 10 MG/ML
20 INJECTION INTRAMUSCULAR; INTRAVENOUS ONCE
Status: COMPLETED | OUTPATIENT
Start: 2017-09-21 | End: 2017-09-21

## 2017-09-21 NOTE — PROGRESS NOTES
[facility]    Progress Note    Valeria Culelar Patient Status:  Inpatient    10/3/1932 MRN [id number]   [facility] 5NW-A Attending Jan Hall, [address] Day # 1 PCP None Pcp     Subjective:  Valeria Cuellar is a(n) 80year old ma no diagnostic evidence for regional wall motion abnormalities.     Doppler parameters are consistent with abnormal left ventricular     relaxation - grade 1 diastolic dysfunction. 2. Right ventricle:  The cavity size was normal. Wall thickness was normal. within the normal range. There  was no evidence for stenosis. There was mild regurgitation. Pulmonic valve:    Structurally normal valve.   Cusp separation was normal.  Doppler:  Transvalvular velocity was within the normal range.  There was no  regurgitat ml/min     Hypertension     Pneumonia     Notalgia paresthetica     Inflamed seborrheic keratosis     Hoarseness     Pleural effusion     At risk for falling     COPD with acute exacerbation (HCC)     COPD exacerbation (HCC)     Cardiac pacemaker in situ

## 2017-09-21 NOTE — PROGRESS NOTES
[facility] Hospitalist Progress Note                                                                   [address] [facility]  10/3/1932    SUBJECTIVE: pt still SOB with ambulation, requiring 6L o2 with am CEPACOL    Assessment/Plan:  Principal Problem:    Chronic obstructive pulmonary disease (HCC)  Active Problems:    Viral syndrome    Dyspnea on exertion     Patient is a 80year old male with PMH sig for COPD, gerd, HTN, anemia, and CKD 3 who presents to

## 2017-09-21 NOTE — PROGRESS NOTES
SPO2% ON ROOM AIR AT REST: N/A     SPO2% AMBULATION ON ROOM AIR:  N/A    SPO2% ON O2: 96% ON: 3 LITERS PER MINUTE     SPO2% AMBULATION ON O2: 92% ON: 6 LITERS PER MINUTE

## 2017-09-21 NOTE — PROGRESS NOTES
Multidisciplinary Discharge Rounds held 9/21/2017. Treatment team members present today include , , Charge Nurse,  Nurse, RT, PT and Pharmacy caring for [employer].      Other care providers present:    Patient Active Problem

## 2017-09-22 RX ORDER — POLYETHYLENE GLYCOL 3350 17 G/17G
17 POWDER, FOR SOLUTION ORAL DAILY PRN
Status: DISCONTINUED | OUTPATIENT
Start: 2017-09-22 | End: 2017-10-13

## 2017-09-22 RX ORDER — DOCUSATE SODIUM 100 MG/1
100 CAPSULE, LIQUID FILLED ORAL DAILY PRN
Status: DISCONTINUED | OUTPATIENT
Start: 2017-09-22 | End: 2017-10-13

## 2017-09-22 NOTE — PROGRESS NOTES
[facility] Hospitalist Progress Note                                                                   [address] [facility]  10/3/1932    SUBJECTIVE: pt notes minimal improvement since yesterday.  Still D obstructive pulmonary disease (HCC)  Active Problems:    Viral syndrome    Dyspnea on exertion     Patient is a 80year old male with PMH sig for COPD, gerd, HTN, anemia, and CKD 3 who presents to ER with SOB.      #SOB/Cough  -likely 2/2 copd exacerbation

## 2017-09-22 NOTE — PROGRESS NOTES
[facility]    Progress Note    Lizzy Doherty Patient Status:  Inpatient    10/3/1932 MRN [id number]   [facility] 5NW-A Attending Bernard Escobedo, [address] [facility] Day # 2 PCP None Pcp     Subjective:  Lizzy Doherty is a(n) 80year old ma abnormal left ventricular     relaxation - grade 1 diastolic dysfunction. 2. Right ventricle: The cavity size was normal. Wall thickness was normal.     Pacer wire noted in the right ventricle.  Systolic function was normal.     Systolic pressure was mildl  Structurally normal valve.   Cusp separation was normal.  Doppler:  Transvalvular velocity was within the normal range. There was no  regurgitation. Pericardium: Garo Donning was no pericardial effusion.   Aorta:  Aortic root: The aortic root was mildly dilated [facility])     Cardiac pacemaker in situ     Postural dizziness with presyncope     Syncopal episodes     Acute bronchitis     Acute bronchitis, unspecified organism     Hemoptysis     Renal insufficiency     Chronic obstructive pulmonary disease ([facility] [address]     Stormy

## 2017-09-23 NOTE — PROGRESS NOTES
SPO2% ON ROOM AIR AT REST:n/a    SPO2% AMBULATION ON ROOM AIR:n/a    SPO2% AMBULATION ON O2: 92% ON 6 LITERS PER MINUTE   PATIENT AMBULATED 600 FEET

## 2017-09-23 NOTE — PROGRESS NOTES
[facility] Hospitalist Progress Note                                                                   [address] [facility]  10/3/1932    SUBJECTIVE: ambulated 3x yesterday.  Feels breathing is a bit bett pulmonary disease ([facility] [address]  Active Problems:    Viral syndrome    Dyspnea on exertion     Patient is a 80year old male with PMH sig for COPD, gerd, HTN, anemia, and CKD 3 who presents to ER with SOB.      #SOB/Cough  -likely 2/2 copd exacerbation  -chest xr

## 2017-09-23 NOTE — PROGRESS NOTES
[facility]  Progress Note    Valeria Cuellar Patient Status:  Inpatient    10/3/1932 MRN [id number]   [facility] 5NW-A Attending Jan Hall, DO   Hosp Day # 3 PCP None Pcp     Subjective:  Valeria Cuellar is a(n) 80year old male. input(s): PCO70OKE, CHOB      Cultures: No new/positive cultures    Radiology: No new films-chest x-ray from several days ago reviewed.   No obvious/prominent interstitial component to his lung disease and a review of the CT scan done earlier in the year wo

## 2017-09-23 NOTE — PLAN OF CARE
Received patient a/ox4, sitting up in chair. Spo2 maintained 99% on 3L while sleeping. No new complaints. He was updated on plan of care and verbalizes understanding.     Patient/Family Goals    • Patient/Family Long Term Goal Progressing    • Patient/Famil

## 2017-09-24 RX ORDER — PREDNISONE 20 MG/1
40 TABLET ORAL 2 TIMES DAILY WITH MEALS
Status: DISCONTINUED | OUTPATIENT
Start: 2017-09-24 | End: 2017-09-27

## 2017-09-24 RX ORDER — CETIRIZINE HYDROCHLORIDE 10 MG/1
10 TABLET ORAL DAILY
Status: DISCONTINUED | OUTPATIENT
Start: 2017-09-24 | End: 2017-10-01

## 2017-09-24 NOTE — PROGRESS NOTES
[facility]  Progress Note    Thor Free Patient Status:  Inpatient    10/3/1932 MRN [id number]   [facility] 5NW-A Attending Fredy Dietz, DO   Hosp Day # 4 PCP None Pcp     Subjective:  Moshe Luna is a(n) 80year old male. GLU  157*   BUN  54*   CREATSERUM  1.68*   CA  9.0   NA  140   K  5.0   CL  106   CO2  27.0     No results for input(s): ABGPHT, RXQJBK7R, GHWZY7X, ABGHCO3, ABGBE, TEMP, YUE, SITE, DEV, THGB in the last 72 hours.     Invalid input(s): PJW13RWM, CHOB

## 2017-09-24 NOTE — PROGRESS NOTES
[facility] Hospitalist Progress Note                                                                   [address] [facility]  10/3/1932    SUBJECTIVE: ambulating more. Still coughing up yellow.  Feels carloz obstructive pulmonary disease (HCC)  Active Problems:    Viral syndrome    Dyspnea on exertion     Patient is a 80year old male with PMH sig for COPD, gerd, HTN, anemia, and CKD 3 who presents to ER with SOB.      #SOB/Cough  -likely 2/2 copd exacerbation

## 2017-09-25 ENCOUNTER — APPOINTMENT (OUTPATIENT)
Dept: GENERAL RADIOLOGY | Facility: HOSPITAL | Age: 82
DRG: 190 | End: 2017-09-25
Attending: HOSPITALIST
Payer: MEDICARE

## 2017-09-25 PROCEDURE — 71010 XR CHEST AP PORTABLE  (CPT=71010): CPT | Performed by: HOSPITALIST

## 2017-09-25 RX ORDER — FUROSEMIDE 10 MG/ML
10 INJECTION INTRAMUSCULAR; INTRAVENOUS ONCE
Status: COMPLETED | OUTPATIENT
Start: 2017-09-25 | End: 2017-09-25

## 2017-09-25 NOTE — PROGRESS NOTES
[facility] Hospitalist Progress Note     PCP: None Pcp    SUBJECTIVE:  Feels more dyspneic today  Witnessed ambulating and became very winded while circling nurses station  Wheezing auscultate don exam, 3L NC  Bilateral LE edema        OBJECTIVE:  Temp:  [97.6 AST, ALB, AMYLASE, LIPASE, LDH in the last 72 hours. Invalid input(s): ALPHOS, TBIL, DBIL, TPROT    No results for input(s): PGLU in the last 72 hours. No results for input(s): TROP in the last 72 hours.   @[facility] Center[facility](       Imaging:  CXR ordered MD POZOBob [facility] Hospitalist  MAGDALENA:[phone]

## 2017-09-25 NOTE — PROGRESS NOTES
[facility]  Progress Note    Darryle Hoyer Patient Status:  Inpatient    10/3/1932 MRN [id number]   [facility] 5NW-A Attending Kehinde [facility] Day # 5 PCP None Pcp     Assessment:     · Dyspnea 2/2 AECOPD  · AECOP Oximetry Type: Continuous  Oximetry Probe Site Changed: Yes  Pulse Ox Probe Location: Right hand  Blood pressure 120/68, pulse 94, temperature 97.8 °F (36.6 °C), temperature source Oral, resp.  rate 20, height 5' 9\" (1.753 m), weight 171 lb 11.2 oz (77.9 k Daily PRN   PEG 3350 (MIRALAX) powder packet 17 g 17 g Oral Daily PRN   Fluticasone Furoate-Vilanterol (BREO ELLIPTA) 200-25 MCG/INH inhaler 1 puff 1 puff Inhalation Daily   Umeclidinium Bromide (INCRUSE ELLIPTA) 62.5 MCG/INH inhaler 1 puff 1 puff Inhalati

## 2017-09-26 RX ORDER — DOXYCYCLINE HYCLATE 100 MG/1
100 CAPSULE ORAL 2 TIMES DAILY
Status: DISCONTINUED | OUTPATIENT
Start: 2017-09-26 | End: 2017-09-28

## 2017-09-26 RX ORDER — FUROSEMIDE 20 MG/1
20 TABLET ORAL ONCE
Status: COMPLETED | OUTPATIENT
Start: 2017-09-26 | End: 2017-09-26

## 2017-09-26 NOTE — PROGRESS NOTES
[facility]  Progress Note    Freida Kay Patient Status:  Inpatient    10/3/1932 MRN [id number]   [facility] 5NW-A Attending Kehinde [facility] Day # 6 PCP None Pcp     Assessment:     · Dyspnea 2/2 AECOPD  · AECOPD productive   reports sleeping well  , but more week and \" shaky legs\"   Objective:  Oxygen Therapy  SpO2: 98 %  O2 Device: High flow nasal cannula  O2 Flow Rate (L/min): 3 L/min  Pulse Oximetry Type: Continuous  Oximetry Probe Site Changed: Yes  Pulse Ox (LASIX) tab 20 mg 20 mg Oral Once   CEPACOL (CEPACOL (SUGAR FREE)) 1 lozenge 1 lozenge Oral QID PRN   predniSONE (DELTASONE) tab 40 mg 40 mg Oral BID with meals   cetirizine (ZYRTEC) tab 10 mg 10 mg Oral Daily   dextromethorphan-guaiFENesin (ROBITUSSIN-DM)

## 2017-09-26 NOTE — PLAN OF CARE
Patient/Family Goals    • Patient/Family Short Term Goal Progressing        RESPIRATORY - ADULT    • Achieves optimal ventilation and oxygenation Progressing        SAFETY ADULT - FALL    • Free from fall injury Progressing            Patient received chika

## 2017-09-26 NOTE — PLAN OF CARE
SPO2% on Room Air at rest: N/A - 94% on 3 LPM    SPO2% Ambulation on Room: N/A    SPO2% Ambulation on O2: 88% on 6 Liters per minute

## 2017-09-26 NOTE — PROGRESS NOTES
[facility] Hospitalist Progress Note     PCP: None Pcp    SUBJECTIVE:  Feels much better today after getting extra dose of Lasix  Legs much less edematous  Able to ambulate around the unit w less dyspnea    Tentative dc tomorrow          OBJECTIVE:  Temp:  Roshan for input(s): ALT, AST, ALB, AMYLASE, LIPASE, LDH in the last 72 hours. Invalid input(s): ALPHOS, TBIL, DBIL, TPROT    No results for input(s): PGLU in the last 72 hours. No results for input(s): TROP in the last 72 hours.   @"[employer]"(       Imagin stage 3- monitor bmp, close to baseline      #GERD- cont ppi      PPX: heparin SC Q8     Dispo: dc tentative for tomorrow  Questions/concerns were discussed with patient and/or family by bedside.       Thank Mer Adkins MD  [address] [place] [address]

## 2017-09-27 RX ORDER — PREDNISONE 20 MG/1
20 TABLET ORAL ONCE
Status: COMPLETED | OUTPATIENT
Start: 2017-09-27 | End: 2017-09-27

## 2017-09-27 NOTE — PROGRESS NOTES
[facility]  Progress Note    Darryle Hoyer Patient Status:  Inpatient    10/3/1932 MRN [id number]   [facility] 5NW-A Attending Kehinde [facility] Day # 7 PCP None Pcp     Subjective:  Darryle Hoyer is a(n) 80year old m CREATSERUM  1.82*  1.63*   CA  8.9  8.8   NA  140  141   K  4.7  4.5   CL  106  105   CO2  25.0  26.0     No results for input(s): ABGPHT, NZSKIV2K, TXVKP4P, ABGHCO3, ABGBE, TEMP, YUE, SITE, DEV, THGB in the last 72 hours.     Invalid input(s): ABV17GVW

## 2017-09-27 NOTE — PROGRESS NOTES
[facility] Hospitalist Progress Note     PCP: None Pcp    SUBJECTIVE:  Not quite at baseline yet  Still coughing yellow sputum    OBJECTIVE:  Temp:  [97.5 °F (36.4 °C)-98.2 °F (36.8 °C)] 97.6 °F (36.4 °C)  Pulse:  [80-99] 80  Resp:  [18-24] 18  BP: (119-132)/(6 hours.    Invalid input(s): ALPHOS, TBIL, DBIL, TPROT    No results for input(s): PGLU in the last 72 hours. No results for input(s): TROP in the last 72 hours. @EDWBRIEFLAB(       Imaging:  CXR   1.   COPD changes with interstitial scarring of the lung 3- monitor bmp, close to baseline      #GERD- cont ppi      PPX: heparin SC Q8     Dispo: dc tentative for tomorrow   Questions/concerns were discussed with patient and/or family by bedside.       Thank Wendy Faith MD  [address]

## 2017-09-27 NOTE — PLAN OF CARE
Patient/Family Goals    • Patient/Family Long Term Goal Progressing    • Patient/Family Short Term Goal Progressing        RESPIRATORY - ADULT    • Achieves optimal ventilation and oxygenation Progressing          Patient is alert and oriented.   Denies jose juan

## 2017-09-28 NOTE — PLAN OF CARE
Patient/Family Goals    • Patient/Family Short Term Goal Progressing        RESPIRATORY - ADULT    • Achieves optimal ventilation and oxygenation Progressing        SAFETY ADULT - FALL    • Free from fall injury Progressing        Pt A/O x 3, no distress n

## 2017-09-28 NOTE — PROGRESS NOTES
[facility] Hospitalist Progress Note     PCP: None Pcp    SUBJECTIVE:  Feels well enough ot go home today  ambulating 3K feet yesterday  Still productive cough but thinner secretions    OBJECTIVE:  Temp:  [97.6 °F (36.4 °C)-98.1 °F (36.7 °C)] 98.1 °F (36.7 °C) 122*  92       No results for input(s): ALT, AST, ALB, AMYLASE, LIPASE, LDH in the last 72 hours. Invalid input(s): ALPHOS, TBIL, DBIL, TPROT    No results for input(s): PGLU in the last 72 hours. No results for input(s): TROP in the last 72 hours. see above  - long taper on dc     #SSS - s/p pacemaker     #CKD stage 3- monitor bmp, close to baseline      #GERD- cont ppi      PPX: heparin SC Q8     Dispo: dc today if okay w consultants  Questions/concerns were discussed with patient and/or family by

## 2017-09-28 NOTE — PROGRESS NOTES
Spo2% on room air at rest: 94% on 3 LPM    Spo2 ambulation on room air: N/A    Spo2 ambulation on o2:        90%      On: 6   Liters per minute

## 2017-09-28 NOTE — PROGRESS NOTES
[facility]  Progress Note    Brisa Parnell Patient Status:  Inpatient    10/3/1932 MRN [id number]   [facility] 5NW-A Attending Arthur Busby [facility] Day # 8 PCP None Pcp     Subjective:  Brisa Parnell is a(n) 80year old m Pacemaker     CKD (chronic kidney disease) stage 3, GFR 30-59 ml/min     Hypertension     Pneumonia     Notalgia paresthetica     Inflamed seborrheic keratosis     Hoarseness     Pleural effusion     At risk for falling     COPD with acute exacerbation (HC

## 2017-09-29 ENCOUNTER — APPOINTMENT (OUTPATIENT)
Dept: GENERAL RADIOLOGY | Facility: HOSPITAL | Age: 82
DRG: 190 | End: 2017-09-29
Attending: INTERNAL MEDICINE
Payer: MEDICARE

## 2017-09-29 PROCEDURE — 71010 XR CHEST AP PORTABLE  (CPT=71010): CPT | Performed by: INTERNAL MEDICINE

## 2017-09-29 NOTE — PROGRESS NOTES
[facility] Hospitalist Progress Note     PCP: None Pcp    SUBJECTIVE:  Had difficulty ambulating yesterday afternoon  Felt winded   sputum + MRSA    Although CXR w no infiltrate      OBJECTIVE:  Temp:  [97.9 °F (36.6 °C)-98.4 °F (36.9 °C)] 97.9 °F (36.6 °C)  Pu 49*  53*  45*   CREATSERUM  1.63*  1.79*  1.74*   CA  8.8  8.9  8.8   MG   --   2.5   --    GLU  122*  92  94       No results for input(s): ALT, AST, ALB, AMYLASE, LIPASE, LDH in the last 72 hours.     Invalid input(s): ALPHOS, TBIL, DBIL, TPROT    No resu Vanc/Zosyn  -Pulmonary on consult: changed to Vancomycin and Zosyn yesterday w + MRSA sputum   -lasix IV given x2 9/21, 9/25, oral 9/26       #COPD w chronic resp failure requiring 2-3 L at baseline  - on chronic O2  -2/2 copd, on 3L o2   -cont inhalers, s

## 2017-09-29 NOTE — PLAN OF CARE
Problem: Patient/Family Goals  Goal: Patient/Family Short Term Goal  Patient's Short Term Goal: 9/19(noc): to not have the flu   9/20 AM: To tolerate O2 walk with less oxygen   9/20(noc): to have a better attitude  9/21 PM: get a good night's sleep and hav

## 2017-09-29 NOTE — PROGRESS NOTES
[facility]    Progress Note    Saud Choudhary Patient Status:  Inpatient    10/3/1932 MRN [id number]   [facility] 5NW-A Attending Kehinde [facility] Day # 9 PCP None Pcp     Subjective:  Saud Choudhary is a(n) 80 year o Vancomycin HCl (VANCOCIN) 1,250 mg in sodium chloride 0.9 % 500 mL IVPB 15 mg/kg Intravenous Q24H   predniSONE (DELTASONE) tab 60 mg 60 mg Oral Daily with breakfast   CEPACOL (CEPACOL (SUGAR FREE)) 1 lozenge 1 lozenge Oral QID PRN   cetirizine (ZYRTEC) t Chronic obstructive pulmonary disease (HCC)     Viral syndrome     Dyspnea on exertion        Assessment:    · Dyspnea 2/2 AECOPD & now MRSA in sputum  · +MRSA in Sputum  · AECOPD  · O2 Dependent COPD- baseline 2-3 liters - follows with Deirdre  · Severe Emp

## 2017-09-29 NOTE — PROGRESS NOTES
09/29/17 0500   Provider Notification   Reason for Communication Other (comment)  (Dr Radha Sandoval requested pulm be made aware pt has MRSA sputum)   Test/Procedure Name sputum   Provider Name Other (comment)  (Dr Dodie Mcbride)   Method of Communication Page   Resp

## 2017-09-29 NOTE — PROGRESS NOTES
[facility] dosing service    Initial Pharmacokinetic Consult for Vancomycin Dosing     Lizzy Doherty is a 80year old male admitted on 9/19/17 who is being treated for COPD exacerbation/MRSA infection.  Pharmacy has been asked to dose Vancomycin by Dr. Mayra Gonzalez 4th dose. Goal trough level 15-20 ug/mL. 3.  Pharmacy will need BUN/Scr daily while on Vancomycin to assess renal function. 4.  Pharmacy will follow and monitor renal function changes, toxicity and efficacy. Pharmacy will continue to follow him.

## 2017-09-29 NOTE — PROGRESS NOTES
Pt is a 81 y/o female admitted with COPD exacerbation. alert oriented,denies SOB. 02 sats 93-96% on 3L 02 via NC.on iv zosyn and iv vanco.on contact isolation for MRSA in the sputum cx.no s/s of aspiration noted. will do 02 walk.

## 2017-09-30 RX ORDER — ECHINACEA PURPUREA EXTRACT 125 MG
1 TABLET ORAL
Status: DISCONTINUED | OUTPATIENT
Start: 2017-09-30 | End: 2017-10-13

## 2017-09-30 RX ORDER — FLUTICASONE PROPIONATE 50 MCG
1 SPRAY, SUSPENSION (ML) NASAL DAILY
Status: DISCONTINUED | OUTPATIENT
Start: 2017-09-30 | End: 2017-10-13

## 2017-09-30 RX ORDER — GUAIFENESIN 600 MG
600 TABLET, EXTENDED RELEASE 12 HR ORAL 2 TIMES DAILY
Status: DISCONTINUED | OUTPATIENT
Start: 2017-09-30 | End: 2017-10-13

## 2017-09-30 NOTE — PLAN OF CARE
Received patient a/ox4, sitting up in chair. No new complaints. O2 sats maintained 99% on 3L while sleeping. He as updated on plan of care and verbalizes understanding.     Patient/Family Goals    • Patient/Family Long Term Goal Progressing    • Patient/Fam

## 2017-09-30 NOTE — PROGRESS NOTES
[facility]  Progress Note    Mo Howell Patient Status:  Inpatient    10/3/1932 MRN [id number]   [facility] 5NW-A Attending Valdo Pizarro, *   Hosp Day # 10 PCP None Pcp     Subjective:  Mo Howell is a(n) 80year old Hypertension     Pneumonia     Notalgia paresthetica     Inflamed seborrheic keratosis     Hoarseness     Pleural effusion     At risk for falling     COPD with acute exacerbation (HCC)     COPD exacerbation (HCC)     Cardiac pacemaker in situ     Postural

## 2017-09-30 NOTE — PROGRESS NOTES
[facility] Hospitalist Progress Note     PCP: None Pcp    SUBJECTIVE:  Pt sitting up in bed, says feels breathing slightly better.  Still coughing up yellow sputum. +U, thinks he will have a BM after taking prune juice    OBJECTIVE:  Temp:  [98 °F (36.7 °C)-98 MG  2.5   --    GLU  92  94       No results for input(s): ALT, AST, ALB, AMYLASE, LIPASE, LDH in the last 72 hours. Invalid input(s): ALPHOS, TBIL, DBIL, TPROT    No results for input(s): PGLU in the last 72 hours.     No results for input(s): TROP in and ok with pulm    Questions/concerns were discussed with patient and/or family by bedside.       Thank Odin Burk MD    [facility] Hospitalist  Pager [phone] (7AM-7PM)  Answering Service number: [phone]

## 2017-10-01 ENCOUNTER — APPOINTMENT (OUTPATIENT)
Dept: GENERAL RADIOLOGY | Facility: HOSPITAL | Age: 82
DRG: 190 | End: 2017-10-01
Attending: INTERNAL MEDICINE
Payer: MEDICARE

## 2017-10-01 PROCEDURE — 71010 XR CHEST AP PORTABLE  (CPT=71010): CPT | Performed by: INTERNAL MEDICINE

## 2017-10-01 RX ORDER — LINEZOLID 600 MG/1
600 TABLET, FILM COATED ORAL EVERY 12 HOURS
Status: COMPLETED | OUTPATIENT
Start: 2017-10-01 | End: 2017-10-02

## 2017-10-01 RX ORDER — CETIRIZINE HYDROCHLORIDE 10 MG/1
5 TABLET ORAL DAILY
Status: DISCONTINUED | OUTPATIENT
Start: 2017-10-01 | End: 2017-10-13

## 2017-10-01 RX ORDER — PREDNISONE 20 MG/1
40 TABLET ORAL
Status: DISCONTINUED | OUTPATIENT
Start: 2017-10-02 | End: 2017-10-04

## 2017-10-01 NOTE — PROGRESS NOTES
[facility] Pharmacy Note:  Renal Dose Adjustment for Cetirizine (ZYRTEC)    Lizzy Doherty has been prescribed Cetirizine (Zyrtec) 10 mg orally daily. Estimated Creatinine Clearance: 30 mL/min (based on SCr of 1.83 mg/dL (H)).     His calculated creatinine redd

## 2017-10-01 NOTE — PLAN OF CARE
Received patient a/ox4, sitting up in chair. No new complaints. O2 sats on 3L maintained 99% while sleeping. He was updated on plan of care and verbalizes understanding.     Patient/Family Goals    • Patient/Family Long Term Goal Progressing    • Patient/Fa

## 2017-10-01 NOTE — PROGRESS NOTES
[facility] Hospitalist Progress Note     PCP: None Pcp    SUBJECTIVE:  Pt sitting up in chair. Had episode of loose watery stool today x1. No abdominal pain. Feels breathing is close to baseline.  Walked halls yesterday    OBJECTIVE:  Temp:  [97.8 °F (36.6 ° 8.7   MG   --   2.1   GLU  94  85       No results for input(s): ALT, AST, ALB, AMYLASE, LIPASE, LDH in the last 72 hours. Invalid input(s): ALPHOS, TBIL, DBIL, TPROT    No results for input(s): PGLU in the last 72 hours.     No results for input(s): TR monitor     #SSS - s/p pacemaker     #CKD stage 3- monitor bmp, close to baseline      #GERD- cont ppi      PPX: heparin SC Q8     Dispo: when ok with pulm ok with pulm and c.  Diff results back    Questions/concerns were discussed with patient and/or famil

## 2017-10-01 NOTE — PROGRESS NOTES
[facility]  Progress Note    Thor Free Patient Status:  Inpatient    10/3/1932 MRN [id number]   [facility] 5NW-A Attending Oseil Gordon, *   Hosp Day # 11 PCP None Pcp     Subjective:  Thor Free is a(n) 80year old disease) stage 3, GFR 30-59 ml/min     Hypertension     Pneumonia     Notalgia paresthetica     Inflamed seborrheic keratosis     Hoarseness     Pleural effusion     At risk for falling     COPD with acute exacerbation (HCC)     [facility] ([facility]

## 2017-10-02 ENCOUNTER — APPOINTMENT (OUTPATIENT)
Dept: GENERAL RADIOLOGY | Facility: HOSPITAL | Age: 82
DRG: 190 | End: 2017-10-02
Attending: INTERNAL MEDICINE
Payer: MEDICARE

## 2017-10-02 PROCEDURE — 71010 XR CHEST AP PORTABLE  (CPT=71010): CPT | Performed by: INTERNAL MEDICINE

## 2017-10-02 RX ORDER — SODIUM CHLORIDE 9 MG/ML
INJECTION, SOLUTION INTRAVENOUS CONTINUOUS
Status: DISCONTINUED | OUTPATIENT
Start: 2017-10-02 | End: 2017-10-02

## 2017-10-02 RX ORDER — LINEZOLID 600 MG/1
600 TABLET, FILM COATED ORAL EVERY 12 HOURS
Status: COMPLETED | OUTPATIENT
Start: 2017-10-03 | End: 2017-10-12

## 2017-10-02 RX ORDER — LOPERAMIDE HYDROCHLORIDE 2 MG/1
2 CAPSULE ORAL 4 TIMES DAILY PRN
Status: DISCONTINUED | OUTPATIENT
Start: 2017-10-02 | End: 2017-10-13

## 2017-10-02 NOTE — PROGRESS NOTES
[facility]  Progress Note    Brisa Parnell Patient Status:  Inpatient    10/3/1932 MRN [id number]   [facility] 5NW-A Attending Irvin [place], *   Hosp Day # 12 PCP None Pcp     Subjective:  Brisa Parnell is a(n) 80year old BUN  37*  40*   CREATSERUM  1.83*  2.37*   CA  8.7  9.1   NA  143  141   K  4.0  4.1   CL  108  107   CO2  26.0  26.0     No results for input(s): ABGPHT, IFZSGC4K, QTHUK6H, ABGHCO3, ABGBE, TEMP, YUE, SITE, DEV, THGB in the last 72 hours.     Invalid in

## 2017-10-02 NOTE — PROGRESS NOTES
[facility] Hospitalist Progress Note     PCP: None Pcp    SUBJECTIVE:  Pt sitting up in chair. Says no loose stools today. Had 4 stools yesterday, last two of which were watery. Still coughing up yellow sputum.  Breathing ok    OBJECTIVE:  Temp:  [97.6 °F 8.7  9.1   MG  2.1  2.2   GLU  85  126*       No results for input(s): ALT, AST, ALB, AMYLASE, LIPASE, LDH in the last 72 hours. Invalid input(s): ALPHOS, TBIL, DBIL, TPROT    No results for input(s): PGLU in the last 72 hours.     No results for input(s pacemaker     #acute kidney injury on CKD stage 3- monitor bmp  -hold lasix for now, likely secondary to watery stools     #GERD- cont ppi      PPX: heparin SC Q8     Dispo: when ok with pulm ok with pulm and Cr improved    Questions/concerns were discusse

## 2017-10-02 NOTE — PLAN OF CARE
Patient/Family Goals    • Patient/Family Long Term Goal Progressing    • Patient/Family Short Term Goal Progressing        RESPIRATORY - ADULT    • Achieves optimal ventilation and oxygenation Progressing        SAFETY ADULT - FALL    • Free from fall inju WITH SON WHEN MEDICALLY STABLE.

## 2017-10-03 RX ORDER — PANTOPRAZOLE SODIUM 40 MG/1
40 TABLET, DELAYED RELEASE ORAL
Status: DISCONTINUED | OUTPATIENT
Start: 2017-10-03 | End: 2017-10-13

## 2017-10-03 NOTE — PROGRESS NOTES
[facility] Hospitalist Progress Note     PCP: None Pcp    SUBJECTIVE:  Pt sitting up in chair. Coughing up yellow sputum, coughing improving.  Walking halls. +U, +BM    OBJECTIVE:  Temp:  [97.6 °F (36.4 °C)-98.1 °F (36.7 °C)] 97.6 °F (36.4 °C)  Pulse:  [ CREATSERUM  1.83*  2.37*  2.04*   CA  8.7  9.1  9.0   MG  2.1  2.2  2.3   GLU  85  126*  113*       Recent Labs   Lab  10/02/17   0605   ALB  2.8*       No results for input(s): PGLU in the last 72 hours.     No results for input(s): TROP in the last 72 h injury on CKD stage 3- monitor bmp  -hold lasix for now, likely secondary to watery stools  -improving. Baseline Cr 1.8ish.  Currently 2.     #GERD- cont ppi      PPX: heparin SC Q8     Dispo: when ok with pulm ok with pulm and Cr improved    Questions/conc

## 2017-10-03 NOTE — PLAN OF CARE
AxO x4. On 3L at rest, 6L with exertion. Diminished breath sounds bilaterally, RLL inspiratory/expiratory wheezes. Had thrush previously, mycelex lozenges scheduled. . Nebs scheduled. BLE edema, LLE non-pitting and RLE 1+, improving.  Heparin SQ, afterno

## 2017-10-03 NOTE — PROGRESS NOTES
[facility]    Progress Note    August Dillon Patient Status:  Inpatient    10/3/1932 MRN [id number]   [facility] 5NW-A Attending Mora Bhat, *   Hosp Day # 13 PCP None Pcp     Subjective:  August Dillon is a(n) 80 year mild edema.          Medications Reviewed:    Current Facility-Administered Medications:  Loperamide HCl (IMODIUM) cap 2 mg 2 mg Oral QID PRN   linezolid (ZYVOX) tab 600 mg 600 mg Oral Q12H   cetirizine (ZYRTEC) tab 5 mg 5 mg Oral Daily   predniSONE (DELTAS Notalgia paresthetica     Inflamed seborrheic keratosis     Hoarseness     Pleural effusion     At risk for falling     COPD with acute exacerbation (HCC)     COPD exacerbation (HCC)     Cardiac pacemaker in situ     Postural dizziness with presyncope

## 2017-10-04 RX ORDER — SODIUM CHLORIDE 9 MG/ML
INJECTION, SOLUTION INTRAVENOUS CONTINUOUS
Status: ACTIVE | OUTPATIENT
Start: 2017-10-04 | End: 2017-10-04

## 2017-10-04 RX ORDER — PREDNISONE 10 MG/1
10 TABLET ORAL ONCE
Status: COMPLETED | OUTPATIENT
Start: 2017-10-04 | End: 2017-10-04

## 2017-10-04 NOTE — PROGRESS NOTES
[facility]    Progress Note    Shobha Chao Patient Status:  Inpatient    10/3/1932 MRN [id number]   [facility] 5NW-A Attending Anjelica Levine, *   Hosp Day # 14 PCP None Pcp     Subjective:  Shobha Chao is a(n) 80 year lung bases which may represent mild edema.          Medications Reviewed:    Current Facility-Administered Medications:  0.9%  NaCl infusion  Intravenous Continuous   Pantoprazole Sodium (PROTONIX) EC tab 40 mg 40 mg Oral BID AC   Loperamide HCl (IMODIUM) c (chronic kidney disease) stage 3, GFR 30-59 ml/min     Hypertension     Pneumonia     Notalgia paresthetica     Inflamed seborrheic keratosis     Hoarseness     Pleural effusion     At risk for falling     COPD with acute exacerbation (HCC)     COPD exacer

## 2017-10-04 NOTE — PROGRESS NOTES
Sp02 percent on 3 L at rest...92%  Sp02 percent ambulation on 6 L. .. 86%  Sp02 percent ambulation on 02 90% on 8 liters per minute

## 2017-10-04 NOTE — PROGRESS NOTES
[facility] Hospitalist Progress Note     PCP: None Pcp    SUBJECTIVE:  Pt sitting up in chair. Yesterday had black stool that was pudding in consistency--says has bleeding external hemorrhoids, known. No LH/dizziness. No abdominal pain.   Gets sob upo 137.0*  157.0       Recent Labs   Lab  10/02/17   0605  10/03/17   0556   NA  141  141   K  4.1  4.1   CL  107  107   CO2  26.0  25.0   BUN  40*  36*   CREATSERUM  2.37*  2.04*   CA  9.1  9.0   MG  2.2  2.3   GLU  126*  113*       Recent Labs   Lab  10/02/ -cont inhalers, see above  - long taper on dc    Anemia-chronic, stable, suspect secondary to copd, monitor    Occasional tachycardia-suspect secondary to nebs, monitor     #SSS - s/p pacemaker     #acute kidney injury on CKD stage 3- monitor bmp  -hold

## 2017-10-04 NOTE — CM/SW NOTE
Met with pt's dtr, Dr Fidel Chavis who stated she would like her father to have [facility] at [place]. She has spoken with pt who does now agree to [facility] at discharge. They have no preference in providers.   Pt lives in [place] and the closest hospital is [facility]

## 2017-10-05 ENCOUNTER — APPOINTMENT (OUTPATIENT)
Dept: GENERAL RADIOLOGY | Facility: HOSPITAL | Age: 82
DRG: 190 | End: 2017-10-05
Attending: NURSE PRACTITIONER
Payer: MEDICARE

## 2017-10-05 PROCEDURE — 71010 XR CHEST AP PORTABLE  (CPT=71010): CPT | Performed by: NURSE PRACTITIONER

## 2017-10-05 RX ORDER — PREDNISONE 20 MG/1
40 TABLET ORAL
Status: DISCONTINUED | OUTPATIENT
Start: 2017-10-06 | End: 2017-10-07

## 2017-10-05 RX ORDER — IPRATROPIUM BROMIDE AND ALBUTEROL SULFATE 2.5; .5 MG/3ML; MG/3ML
3 SOLUTION RESPIRATORY (INHALATION) AS NEEDED
Status: DISCONTINUED | OUTPATIENT
Start: 2017-10-05 | End: 2017-10-13

## 2017-10-05 RX ORDER — FUROSEMIDE 20 MG/1
20 TABLET ORAL
Status: DISCONTINUED | OUTPATIENT
Start: 2017-10-05 | End: 2017-10-10

## 2017-10-05 RX ORDER — IPRATROPIUM BROMIDE AND ALBUTEROL SULFATE 2.5; .5 MG/3ML; MG/3ML
3 SOLUTION RESPIRATORY (INHALATION) EVERY 2 HOUR PRN
Status: DISCONTINUED | OUTPATIENT
Start: 2017-10-05 | End: 2017-10-05

## 2017-10-05 RX ORDER — METHYLPREDNISOLONE SODIUM SUCCINATE 40 MG/ML
40 INJECTION, POWDER, LYOPHILIZED, FOR SOLUTION INTRAMUSCULAR; INTRAVENOUS EVERY 12 HOURS
Status: COMPLETED | OUTPATIENT
Start: 2017-10-05 | End: 2017-10-06

## 2017-10-05 NOTE — PROGRESS NOTES
[facility]    Progress Note    Sukhdev Barrera Patient Status:  Inpatient    10/3/1932 MRN [id number]   [facility] 5NW-A Attending Mani Bowling, *   Hosp Day # 15 PCP None Pcp     Subjective:  Sukhdev Barrera is a(n) 80 year lozenge 10 mg 1 lozenge Oral Q4H While awake   Fluticasone Propionate (FLONASE) 50 MCG/ACT nasal spray 1 spray 1 spray Each Nare Daily   Saline Nasal Spray (SALINE MIST) 1 spray 1 spray Each Nare Q3H PRN   GuaiFENesin ER (MUCINEX) 12 hr tab 600 mg 600 mg O exertion        Assessment:    · Dyspnea - multifactorial  · MRSA Bronchitis vs PNA  · O2 Dependent COPD- baseline 2-3 liters- follows with Dr. Keerthi Mooer  · Sever Emphysema/ Bullous Dz on imaging  · Mild HFpEF 55%, RV size & fx normal  · SSS s/p PPM  · HTN  ·

## 2017-10-05 NOTE — PLAN OF CARE
RESPIRATORY - ADULT    • Achieves optimal ventilation and oxygenation Progressing          SAFETY ADULT - FALL    • Free from fall injury Progressing          PROBLEM: COPD     ASSESSMENT: Pt is A&O x4. VSS, afebrile.  Currently maintaining O2 sat > 92% on

## 2017-10-05 NOTE — PROGRESS NOTES
[facility] Hospitalist Progress Note     PCP: None Pcp    SUBJECTIVE:  Pt seen while in restroom--says he's significantly sob, got worse with exertion (was standing up after having BM). Says lasting longer than yesterday.   No cp/abdominal pain/n/v/ --   137.0*  157.0  134.0*       Recent Labs   Lab  10/03/17   0556  10/04/17   0850  10/05/17   0609   NA  141  140  142   K  4.1  3.9  4.1   CL  107  108  110   CO2  25.0  22.0  23.0   BUN  36*  35*  30*   CREATSERUM  2.04*  2.28*  1.74*   CA  9.0  9.0 stool/melena?- pt without abdominal pain or LH dizziness  -known external hemorrhoids  -HDS, Hb stable, equilibrating  -FOBT neg thus far  -on PPI  -given sob, stat Hb ordered     #COPD w chronic resp failure requiring 2-3 L at baseline  - on chronic O2  -

## 2017-10-05 NOTE — PROGRESS NOTES
SPO2% on room air at rest: N/A  SPO2% on room air at ambulation: N/A  SPO2% on O2 6L/min at rest: 93%  SPO2% on O2 6L/min on ambulation: 90%    Pt took several breaks during O2 walk but overall tolerated well.

## 2017-10-06 RX ORDER — PREDNISONE 10 MG/1
TABLET ORAL
Qty: 20 TABLET | Refills: 0 | Status: SHIPPED | OUTPATIENT
Start: 2017-10-06 | End: 2017-10-13

## 2017-10-06 RX ORDER — FLUTICASONE PROPIONATE 50 MCG
1 SPRAY, SUSPENSION (ML) NASAL DAILY
Qty: 1 BOTTLE | Refills: 11 | Status: SHIPPED | OUTPATIENT
Start: 2017-10-07

## 2017-10-06 NOTE — CM/SW NOTE
[facility] [facility] called to confirm they will accept patient at d/c.    [phone]

## 2017-10-06 NOTE — PROGRESS NOTES
[facility] Hospitalist Progress Note     PCP: None Pcp    SUBJECTIVE:  No acute issues. Breathing is better than yesterday but not at baseline.       OBJECTIVE:  Temp:  [97.8 °F (36.6 °C)-98.1 °F (36.7 °C)] 97.8 °F (36.6 °C)  Pulse:  [] 94 10/06/17   0602   NA  140  142  140   K  3.9  4.1  4.6   CL  108  110  108   CO2  22.0  23.0  23.0   BUN  35*  30*  34*   CREATSERUM  2.28*  1.74*  1.82*   CA  9.0  9.0  8.9   MG  2.2  2.4  2.3   GLU  143*  108*  133*          Meds:     • furosemide  20 mg

## 2017-10-06 NOTE — PROGRESS NOTES
[facility]    Progress Note    Stormy Gracia Patient Status:  Inpatient    10/3/1932 MRN [id number]   [facility] 5NW-A Attending Kiara Newton, *   Hosp Day # 16 PCP None Pcp     Subjective:  Stormy Gracia is a(n) 80 year 40 mg Oral BID AC   Loperamide HCl (IMODIUM) cap 2 mg 2 mg Oral QID PRN   linezolid (ZYVOX) tab 600 mg 600 mg Oral Q12H   cetirizine (ZYRTEC) tab 5 mg 5 mg Oral Daily   clotrimazole (MYCELEX) 10 MG lozenge 10 mg 1 lozenge Oral Q4H While awake   Fluticasone Acute bronchitis, unspecified organism     Hemoptysis     Renal insufficiency     Chronic obstructive pulmonary disease (HCC)     Viral syndrome     Dyspnea on exertion        Assessment:    · Dyspnea - multifactorial 2/2 mild VO intermittent an significan

## 2017-10-07 RX ORDER — ENOXAPARIN SODIUM 100 MG/ML
30 INJECTION SUBCUTANEOUS DAILY
Status: DISCONTINUED | OUTPATIENT
Start: 2017-10-07 | End: 2017-10-13

## 2017-10-07 RX ORDER — METHYLPREDNISOLONE SODIUM SUCCINATE 40 MG/ML
40 INJECTION, POWDER, LYOPHILIZED, FOR SOLUTION INTRAMUSCULAR; INTRAVENOUS EVERY 8 HOURS
Status: COMPLETED | OUTPATIENT
Start: 2017-10-07 | End: 2017-10-09

## 2017-10-07 NOTE — PLAN OF CARE
Patient/Family Goals    • Patient/Family Short Term Goal Progressing        RESPIRATORY - ADULT    • Achieves optimal ventilation and oxygenation Progressing        SAFETY ADULT - FALL    • Free from fall injury Progressing

## 2017-10-07 NOTE — PROGRESS NOTES
[facility]    Progress Note    Akosua Whipple Patient Status:  Inpatient    10/3/1932 MRN [id number]   [facility] 5NW-A Attending Florence Huang, *   Hosp Day # 17 PCP None Pcp     Subjective:  Akosua Whipple is a(n) 80 year Oral Daily   clotrimazole (MYCELEX) 10 MG lozenge 10 mg 1 lozenge Oral Q4H While awake   Fluticasone Propionate (FLONASE) 50 MCG/ACT nasal spray 1 spray 1 spray Each Nare Daily   Saline Nasal Spray (SALINE MIST) 1 spray 1 spray Each Nare Q3H PRN   GuaiFENe status  · Change heparin SQ to lovenox 30mg SQ daily due to bruising.   Reviewed with pharmacy re renal function and creatinine clearance  · To f/u with Dr. Amor Granados post discharge  · CXR stable w/ small bilateral effusions     Discussed with pt's daughter,

## 2017-10-07 NOTE — PLAN OF CARE
Patient/Family Goals    • Patient/Family Short Term Goal Progressing        RESPIRATORY - ADULT    • Achieves optimal ventilation and oxygenation Progressing        SAFETY ADULT - FALL    • Free from fall injury Progressing            Patient received aler

## 2017-10-07 NOTE — PROGRESS NOTES
[facility] Hospitalist Progress Note     PCP: None Pcp    SUBJECTIVE:  Still not doing great in regards to his breathing. desatted with brushing his hair. Frustrated.       OBJECTIVE:  Temp:  [97.9 °F (36.6 °C)-98.2 °F (36.8 °C)] 98.2 °F (36.8 34*   --    CREATSERUM  1.74*  1.82*  1.89*   CA  9.0  8.9   --    MG  2.4  2.3   --    GLU  108*  133*   --           Meds:     • furosemide  20 mg Oral Daily   • predniSONE  40 mg Oral Daily with breakfast   • Pantoprazole Sodium  40 mg Oral BID AC   • l

## 2017-10-08 NOTE — PROGRESS NOTES
SPO2% ON ROOM AIR AT REST:n/a    SPO2% AMBULATION ON ROOM AIR:n/a    SPO2% AMBULATION ON O2: 90% ON 6 LITERS PER MINUTE.  PT AMBULATED 300 FEET

## 2017-10-08 NOTE — PROGRESS NOTES
[facility]    Progress Note    Carter Hernandez Patient Status:  Inpatient    10/3/1932 MRN [id number]   [facility] 5NW-A Attending Paula Mccallum, *   Hosp Day # 18 PCP None Pcp     Subjective:  Carter Hernandez is a(n) 80 year cetirizine (ZYRTEC) tab 5 mg 5 mg Oral Daily   clotrimazole (MYCELEX) 10 MG lozenge 10 mg 1 lozenge Oral Q4H While awake   Fluticasone Propionate (FLONASE) 50 MCG/ACT nasal spray 1 spray 1 spray Each Nare Daily   Saline Nasal Spray (SALINE MIST) 1 spray Rubia Baez post discharge  · CXR stable w/ small bilateral effusions       Jerson Ellis MD, [address] [place] [address] [facility]

## 2017-10-08 NOTE — PROGRESS NOTES
[facility] Hospitalist Progress Note     PCP: None Pcp    SUBJECTIVE:  He feels a bit better today. Got washed up and was sob but not as severe as prior days. Placed back on IV steroids yesterday. No fevers or chills. Denies edema. --    --    CREATSERUM  1.82*  1.89*  1.90*   CA  8.9   --    --    MG  2.3   --    --    GLU  133*   --    --           Meds:     • MethylPREDNISolone Sodium Succ  40 mg Intravenous Q8H   • enoxaparin  30 mg Subcutaneous Daily   • furosemide  20 mg Oral D

## 2017-10-09 NOTE — PLAN OF CARE
Patient/Family Goals    • Patient/Family Long Term Goal Progressing    • Patient/Family Short Term Goal Progressing        RESPIRATORY - ADULT    • Achieves optimal ventilation and oxygenation Progressing        SAFETY ADULT - FALL    • Free from fall inju stable. Current discharge plan: discharge home when medically stable and cleared by pulmonology.

## 2017-10-09 NOTE — PROGRESS NOTES
[facility]  Progress Note    Staci Owen Patient Status:  Inpatient    10/3/1932 MRN [id number]   [facility] 5NW-A Attending Vishal Payne, [facility] Day # 19 PCP None Pcp     Assessment:     · Dyspnea - multifactorial 2/2 mil (L/min): 3 L/min  Pulse Oximetry Type: Continuous  Oximetry Probe Site Changed: Yes  Pulse Ox Probe Location: Left hand  Blood pressure 123/69, pulse 100, temperature 98.3 °F (36.8 °C), temperature source Oral, resp.  rate 20, height 5' 9\" (1.753 m), weigh (DUONEB) nebulizer solution 3 mL 3 mL Nebulization PRN   Pantoprazole Sodium (PROTONIX) EC tab 40 mg 40 mg Oral BID AC   Loperamide HCl (IMODIUM) cap 2 mg 2 mg Oral QID PRN   linezolid (ZYVOX) tab 600 mg 600 mg Oral Q12H   cetirizine (ZYRTEC) tab 5 mg 5 mg

## 2017-10-09 NOTE — PROGRESS NOTES
[facility] Hospitalist Progress Note     PCP: None Pcp    SUBJECTIVE:  SOB a little better than yesterday. No cp or new complaints.      OBJECTIVE:  Temp:  [98.3 °F (36.8 °C)-98.5 °F (36.9 °C)] 98.3 °F (36.8 °C)  Pulse:  [] 100  Resp:  [16-20] • multivitamin with minerals  1 tablet Oral Daily   • Roflumilast  250 mcg Oral Daily        ipratropium-albuterol, Loperamide HCl, Saline Nasal Spray, influenza virus vaccine PF, CEPACOL, docusate sodium, PEG 3350, acetaminophen, ondansetron HCl       A

## 2017-10-09 NOTE — PROGRESS NOTES
SPO2% ON ROOM AIR ON 02 3L 99%. SPO2% AMBULATION ON O2 90% ON 6 LITERS PER MINUTE   PT. AMBULATED A TOTAL  FEET.

## 2017-10-10 ENCOUNTER — APPOINTMENT (OUTPATIENT)
Dept: GENERAL RADIOLOGY | Facility: HOSPITAL | Age: 82
DRG: 190 | End: 2017-10-10
Attending: INTERNAL MEDICINE
Payer: MEDICARE

## 2017-10-10 PROCEDURE — 71020 XR CHEST PA + LAT CHEST (CPT=71020): CPT | Performed by: INTERNAL MEDICINE

## 2017-10-10 RX ORDER — CALCIUM CARBONATE 200(500)MG
500 TABLET,CHEWABLE ORAL
Status: DISCONTINUED | OUTPATIENT
Start: 2017-10-10 | End: 2017-10-13

## 2017-10-10 RX ORDER — FAMOTIDINE 20 MG/1
20 TABLET ORAL DAILY
Status: DISCONTINUED | OUTPATIENT
Start: 2017-10-10 | End: 2017-10-13

## 2017-10-10 NOTE — PLAN OF CARE
Patient/Family Goals    • Patient/Family Short Term Goal Progressing        RESPIRATORY - ADULT    • Achieves optimal ventilation and oxygenation Progressing        SAFETY ADULT - FALL    • Free from fall injury Progressing          PROBLEM: COPD exacerbat

## 2017-10-10 NOTE — PROGRESS NOTES
[facility] Pharmacy Note:  Renal Dose Adjustment    Shobha Chao has been prescribed famotidine (PEPCID) 20 mg orally every 12 hours. Estimated Creatinine Clearance: 26.1 mL/min (based on SCr of 2.07 mg/dL (H)).     His calculated creatinine clearance is <50

## 2017-10-10 NOTE — PROGRESS NOTES
[facility]  Progress Note    Edna Ashely Patient Status:  Inpatient    10/3/1932 MRN [id number]   [facility] 5NW-A Attending Humphrey Adams, [address] [facility] Day # 20 PCP None Pcp     Assessment:     · Acute on Chronic Resp Fx - multi falling     COPD with acute exacerbation (HCC)     COPD exacerbation (HCC)     Cardiac pacemaker in situ     Postural dizziness with presyncope     Syncopal episodes     Acute bronchitis     Acute bronchitis, unspecified organism     Hemoptysis     Renal i 10/10/17   0704   CREATSERUM  1.90*  2.03*  2.07*           Lab Results  Component Value Date   PT 13.1 05/25/2011   INR 1.03 07/24/2017   INR 0.98 02/09/2017   INR 0.98 11/08/2016        No results for input(s): TSH in the last 72 hours.     No results for sodium (COLACE) cap 100 mg 100 mg Oral Daily PRN   PEG 3350 (MIRALAX) powder packet 17 g 17 g Oral Daily PRN   Fluticasone Furoate-Vilanterol (BREO ELLIPTA) 200-25 MCG/INH inhaler 1 puff 1 puff Inhalation Daily   Umeclidinium Bromide (INCRUSE ELLIPTA) 62. 5

## 2017-10-10 NOTE — PROGRESS NOTES
[facility] Hospitalist Progress Note     PCP: None Pcp    SUBJECTIVE: No new complaints. Feels the same. Didn't sleep well at night and is trying to rest now.     OBJECTIVE:  Temp:  [98 °F (36.7 °C)-98.6 °F (37 °C)] 98.6 °F (37 °C)  Pulse:  [] • multivitamin with minerals  1 tablet Oral Daily   • Roflumilast  250 mcg Oral Daily        Calcium Carbonate Antacid, ipratropium-albuterol, Loperamide HCl, Saline Nasal Spray, influenza virus vaccine PF, CEPACOL, docusate sodium, PEG 3350, acetaminoph

## 2017-10-10 NOTE — PLAN OF CARE
Patient/Family Goals    • Patient/Family Short Term Goal Progressing        RESPIRATORY - ADULT    • Achieves optimal ventilation and oxygenation Progressing        SAFETY ADULT - FALL    • Free from fall injury Progressing            Received pt at 0700,

## 2017-10-11 RX ORDER — FUROSEMIDE 10 MG/ML
40 INJECTION INTRAMUSCULAR; INTRAVENOUS ONCE
Status: COMPLETED | OUTPATIENT
Start: 2017-10-12 | End: 2017-10-12

## 2017-10-11 RX ORDER — PREDNISONE 50 MG/1
50 TABLET ORAL 2 TIMES DAILY WITH MEALS
Status: DISCONTINUED | OUTPATIENT
Start: 2017-10-11 | End: 2017-10-13

## 2017-10-11 NOTE — PROGRESS NOTES
SPO2% on room air at rest: N/A    SPO2% on room air at ambulation: N/A     SPO2% on 6L O2/min on ambulation: 90%

## 2017-10-11 NOTE — PROGRESS NOTES
[facility] Hospitalist Progress Note     PCP: None Pcp    CC: F/U dyspnea    SUBJECTIVE: States breathing feels a little better, although has not yet walked today. Less coughing. No CP.      OBJECTIVE:  Temp:  [97.6 °F (36.4 °C)-98.2 °F (36.8 °C)] 98 Furoate-Vilanterol  1 puff Inhalation Daily   • Umeclidinium Bromide  1 puff Inhalation Daily   • ipratropium-albuterol  3 mL Nebulization Q4H   • aspirin  81 mg Oral Daily   • multivitamin with minerals  1 tablet Oral Daily   • Roflumilast  250 mcg Oral D

## 2017-10-11 NOTE — PROGRESS NOTES
[facility]  Progress Note    Lizzy Doherty Patient Status:  Inpatient    10/3/1932 MRN [id number]   [facility] 5NW-A Attending Bernard Escobedo, DO   Hosp Day # 21 PCP None Pcp     Assessment:     · Acute on Chronic Resp Fx - multi episodes     Acute bronchitis     Acute bronchitis, unspecified organism     Hemoptysis     Renal insufficiency     Chronic obstructive pulmonary disease (HCC)     Viral syndrome     Dyspnea on exertion      Subjective:  Valeria  is a(n) 80year old --   68   AST   --    --   19   ALT   --    --   76*   BILT   --    --   0.7   TP   --    --   6.6           Lab Results  Component Value Date   PT 13.1 05/25/2011   INR 1.03 07/24/2017   INR 0.98 02/09/2017   INR 0.98 11/08/2016        No results for in FREE)) 1 lozenge 1 lozenge Oral QID PRN   docusate sodium (COLACE) cap 100 mg 100 mg Oral Daily PRN   PEG 3350 (MIRALAX) powder packet 17 g 17 g Oral Daily PRN   Fluticasone Furoate-Vilanterol (BREO ELLIPTA) 200-25 MCG/INH inhaler 1 puff 1 puff Inhalation

## 2017-10-11 NOTE — PLAN OF CARE
RESPIRATORY - ADULT    • Achieves optimal ventilation and oxygenation Progressing          PROBLEM: COPD    ASSESSMENT: Pt is A&O x4. VSS, afebrile. Maintaining O2 sat WNL on baseline 3L NC. Requiring 6L O2 with activity - ongoing dyspnea with exertion.  We

## 2017-10-11 NOTE — PROGRESS NOTES
SPO2% on room air at rest: N/A     SPO2% on room air at ambulation: N/A      SPO2% on 6L O2/min on ambulation: 90%    Pt walked 1,200 ft, did need to stop ambulating a few times to catch his breath.

## 2017-10-11 NOTE — PROGRESS NOTES
SPO2% on room air at rest: N/A     SPO2% on room air at ambulation: N/A      SPO2% on 6L O2/min on ambulation: 90%    Pt walked 1,200 ft - farther distance from walk this morning.

## 2017-10-12 PROCEDURE — 99222 1ST HOSP IP/OBS MODERATE 55: CPT | Performed by: INTERNAL MEDICINE

## 2017-10-12 RX ORDER — FUROSEMIDE 20 MG/1
20 TABLET ORAL DAILY
Status: DISCONTINUED | OUTPATIENT
Start: 2017-10-12 | End: 2017-10-13

## 2017-10-12 NOTE — RESPIRATORY THERAPY NOTE
Progressing. Patient to continue on BD, and follow protocol. Oxygen saturations are 98% on 3 L.     Plan to continue to walk around without SOB

## 2017-10-12 NOTE — PLAN OF CARE
Patient/Family Goals    • Patient/Family Long Term Goal Progressing    • Patient/Family Short Term Goal Progressing        RESPIRATORY - ADULT    • Achieves optimal ventilation and oxygenation Progressing        Problem: COPD exacerbation  Data:Patient jarad

## 2017-10-12 NOTE — PROGRESS NOTES
[facility] Hospitalist Progress Note     PCP: None Pcp    CC: F/U dyspnea    SUBJECTIVE: States breathing feels a little worse than yesterday. No CP.      OBJECTIVE:  Temp:  [97.6 °F (36.4 °C)-98.2 °F (36.8 °C)] 97.6 °F (36.4 °C)  Pulse:  [] 95 Furoate-Vilanterol  1 puff Inhalation Daily   • Umeclidinium Bromide  1 puff Inhalation Daily   • ipratropium-albuterol  3 mL Nebulization Q4H   • aspirin  81 mg Oral Daily   • multivitamin with minerals  1 tablet Oral Daily   • Roflumilast  250 mcg Oral D

## 2017-10-12 NOTE — PROGRESS NOTES
[facility]  Progress Note    August Banksr Patient Status:  Inpatient    10/3/1932 MRN [id number]   [facility] 5NW-A Attending Neetu Gallo DO   Hosp Day # 22 PCP None Pcp     Assessment:     · Acute on Chronic Resp Fx - multi old male. Feels better today, hasn't walked yet but wants to. Sputum a bit lighter.    Objective:  Oxygen Therapy  SpO2: 97 %  O2 Device: Nasal cannula  O2 Flow Rate (L/min): 3 L/min  Pulse Oximetry Type: Continuous  Oximetry Probe Site Changed: Yes  Pul DEV, THGB in the last 72 hours. Invalid input(s): [id number], CHOB    Cultures:    Radiology:  10/10 CXR:  Extensive bullous emphysematous changes in the upper lungs again noted. The pacemaker leads are unchanged.  There is fibrosis in the lower one half o tab 650 mg 650 mg Oral Q6H PRN   ipratropium-albuterol (DUONEB) nebulizer solution 3 mL 3 mL Nebulization Q4H   ondansetron HCl (ZOFRAN) injection 4 mg 4 mg Intravenous Q6H PRN   aspirin EC tab 81 mg 81 mg Oral Daily   multivitamin with minerals (ADULT) ta

## 2017-10-13 VITALS
TEMPERATURE: 98 F | WEIGHT: 167.81 LBS | HEART RATE: 103 BPM | SYSTOLIC BLOOD PRESSURE: 117 MMHG | RESPIRATION RATE: 20 BRPM | OXYGEN SATURATION: 100 % | BODY MASS INDEX: 24.86 KG/M2 | HEIGHT: 69 IN | DIASTOLIC BLOOD PRESSURE: 66 MMHG

## 2017-10-13 PROCEDURE — 99232 SBSQ HOSP IP/OBS MODERATE 35: CPT | Performed by: INTERNAL MEDICINE

## 2017-10-13 RX ORDER — PREDNISONE 10 MG/1
TABLET ORAL
Qty: 80 TABLET | Refills: 0 | Status: SHIPPED | OUTPATIENT
Start: 2017-10-13 | End: 2017-10-31

## 2017-10-13 RX ORDER — GUAIFENESIN 600 MG
600 TABLET, EXTENDED RELEASE 12 HR ORAL 2 TIMES DAILY
Qty: 30 TABLET | Refills: 0 | Status: SHIPPED | OUTPATIENT
Start: 2017-10-13 | End: 2017-11-14

## 2017-10-13 NOTE — PROGRESS NOTES
[facility]  Nephrology Progress Note    Candace Carlton Attending:  Rhina Xie MD       Assessment and Plan:    1) OBIE- likely due to diuretic effect; no other insults noted. Meds are otherwise benign. No obstructive symptoms.  Hemodynamically stab WBC 7.2 10/13/2017   HGB 11.3 10/13/2017   HCT 33.0 10/13/2017   .0 10/13/2017   CREATSERUM 2.11 10/13/2017   BUN 60 10/13/2017    10/13/2017   K 4.7 10/13/2017    10/13/2017   CO2 25.0 10/13/2017    10/13/2017   CA 9.1 10/13/20 multivitamin with minerals (ADULT) tab 1 tablet 1 tablet Oral Daily   Roflumilast TABS 250 mcg 250 mcg Oral Daily         Questions/concerns were discussed with patient and/or family by bedside.           Samantha Raymond  10/13/2017  9:28 AM

## 2017-10-13 NOTE — PROGRESS NOTES
[facility]  Progress Note    Darryle Hoyer Patient Status:  Inpatient    10/3/1932 MRN [id number]   [facility] 5NW-A Attending Jassi Blum DO   Hosp Day # 23 PCP None Pcp     Assessment:     · Acute on Chronic Resp Fx - multi risk for falling     COPD with acute exacerbation (HCC)     COPD exacerbation (HCC)     Cardiac pacemaker in situ     Postural dizziness with presyncope     Syncopal episodes     Acute bronchitis     Acute bronchitis, unspecified organism     Hemoptysis 9.2  9.1   ALB  2.8*   --    --    NA  137  138  136   K  5.0  4.5  4.7   CL  102  103  103   CO2  23.0  24.0  25.0   ALKPHO  68   --    --    AST  19   --    --    ALT  76*   --    --    BILT  0.7   --    --    TP  6.6   --    --            Lab Results  C influenza virus vaccine (FLUAD) ages 72 years and older inj 0.5ml 0.5 mL Intramuscular Prior to discharge   CEPACOL (CEPACOL (SUGAR FREE)) 1 lozenge 1 lozenge Oral QID PRN   docusate sodium (COLACE) cap 100 mg 100 mg Oral Daily PRN   PEG 3350 (MIRALAX) p

## 2017-10-13 NOTE — CONSULTS
[facility]  Report of Consultation    Candace Carlton Patient Status:  Inpatient    10/3/1932 MRN [id number]   [facility] 5NW-A Attending Rhina Xie MD   Hosp Day # 25 PCP None Pcp       Assessment / Plan:    1) OBIE- likely due to Surgical History:  No date: ANGIOGRAM  No date: PACEMAKER      Comment: [employer] Pacemaker  No date: REPAIR ROTATOR CUFF,ACUTE      Comment: left  Family History   Problem Relation Age of Onset   • Stroke Father    • Cancer Mother      Cancer - stomach   • Daily PRN  •  Fluticasone Furoate-Vilanterol (BREO ELLIPTA) 200-25 MCG/INH inhaler 1 puff, 1 puff, Inhalation, Daily  •  Umeclidinium Bromide (INCRUSE ELLIPTA) 62.5 MCG/INH inhaler 1 puff, 1 puff, Inhalation, Daily  •  acetaminophen (TYLENOL) tab 650 mg, 6 Without clubbing, cyanosis; no edema  Neurologic: Cranial nerves grossly intact, moving all extremities  Skin: Warm and dry, no rashes      Laboratory Data:    Lab Results  Component Value Date   CREATSERUM 2.18 10/12/2017   BUN 52 10/12/2017    10/1

## 2017-10-13 NOTE — DISCHARGE SUMMARY
General Medicine Discharge Summary     Patient ID:  Chance Nur  80year old  10/3/1932    Admit date: 9/19/2017    Discharge date and time: 10/13/17    Attending Physician: Kris Morgan MD     [facility] list below     **diarrhea- C. Diff neg  -imodium prn     ** possible dark stool  - guiac neg.   Hg stable.       Occasional tachycardia-suspect secondary to nebs, monitor     #SSS - s/p pacemaker     #acute kidney injury on CKD stage 3- monitor bmp.   - Bas mouth daily. omega-3 fatty acids 1000 MG Oral Cap  Take 1,000 mg by mouth daily. Omeprazole 40 MG Oral Capsule Delayed Release  Take 1 capsule (40 mg total) by mouth daily.     Multiple Vitamins-Minerals (MULTI-VITAMIN/MINERALS) Oral Tab  Take 1 table

## 2017-10-13 NOTE — PROGRESS NOTES
To whom it may concern,      Mr. Mari Hernández was hospitalized 17 to 10/13/17 at [facility]. Please excuse Mr. Wangaleyda Antolin from obligations during this time period. If there are further questions, feel free to contact me.       Sincerely,        Vicki Lam

## 2017-10-14 NOTE — CM/SW NOTE
RN, Yordan Ulloa, called because she got a call from [facility] saying they were missing the order to start home healthcare. I called Aaron Borges at [phone] / [phone] and they asked me to fax the order to [phone].    I got a call back at 1

## 2017-10-14 NOTE — PROGRESS NOTES
NURSING DISCHARGE NOTE    Discharged Home via Wheelchair. Accompanied by Family member  Belongings Taken by patient/family. VSS. Patient alert and oriented. Iv discontinued. Discharge instructions and prescriptions given to patient.  He verbalized u

## 2017-10-16 NOTE — CM/SW NOTE
Patient discharged on 10/13/2017 as previously planned.        10/16/17 0900   Discharge disposition   Discharged to: Home-Health   Name of John Churchill Ev ([facility])  ([facility])   Home services after discharge Skilled home care   Discharge tr

## 2017-10-29 ENCOUNTER — HOSPITAL ENCOUNTER (INPATIENT)
Facility: HOSPITAL | Age: 82
LOS: 2 days | Discharge: HOME HEALTH | DRG: 074 | End: 2017-10-31
Attending: EMERGENCY MEDICINE | Admitting: INTERNAL MEDICINE
Payer: MEDICARE

## 2017-10-29 ENCOUNTER — APPOINTMENT (OUTPATIENT)
Dept: GENERAL RADIOLOGY | Facility: HOSPITAL | Age: 82
DRG: 074 | End: 2017-10-29
Attending: EMERGENCY MEDICINE
Payer: MEDICARE

## 2017-10-29 ENCOUNTER — APPOINTMENT (OUTPATIENT)
Dept: CT IMAGING | Facility: HOSPITAL | Age: 82
DRG: 074 | End: 2017-10-29
Attending: EMERGENCY MEDICINE
Payer: MEDICARE

## 2017-10-29 DIAGNOSIS — R53.1 ACUTE LEFT-SIDED WEAKNESS: Primary | ICD-10-CM
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
## 2017-10-29 DIAGNOSIS — R42 DIZZINESS: ICD-10-CM

## 2017-10-29 PROBLEM — D64.9 ANEMIA: Status: ACTIVE | Noted: 2017-10-29

## 2017-10-29 PROBLEM — R73.9 HYPERGLYCEMIA: Status: ACTIVE | Noted: 2017-10-29

## 2017-10-29 PROCEDURE — 99223 1ST HOSP IP/OBS HIGH 75: CPT | Performed by: OTHER

## 2017-10-29 PROCEDURE — 70450 CT HEAD/BRAIN W/O DYE: CPT | Performed by: EMERGENCY MEDICINE

## 2017-10-29 PROCEDURE — 71010 XR CHEST AP PORTABLE  (CPT=71010): CPT | Performed by: EMERGENCY MEDICINE

## 2017-10-29 RX ORDER — SODIUM CHLORIDE 9 MG/ML
INJECTION, SOLUTION INTRAVENOUS ONCE
Status: DISCONTINUED | OUTPATIENT
Start: 2017-10-29 | End: 2017-10-29

## 2017-10-29 RX ORDER — ASPIRIN 81 MG/1
81 TABLET ORAL DAILY
Status: DISCONTINUED | OUTPATIENT
Start: 2017-10-30 | End: 2017-10-31

## 2017-10-29 RX ORDER — FLUTICASONE PROPIONATE 50 MCG
1 SPRAY, SUSPENSION (ML) NASAL DAILY
Status: DISCONTINUED | OUTPATIENT
Start: 2017-10-30 | End: 2017-10-31

## 2017-10-29 RX ORDER — IPRATROPIUM BROMIDE AND ALBUTEROL SULFATE 2.5; .5 MG/3ML; MG/3ML
3 SOLUTION RESPIRATORY (INHALATION) EVERY 4 HOURS PRN
Status: DISCONTINUED | OUTPATIENT
Start: 2017-10-29 | End: 2017-10-31

## 2017-10-29 RX ORDER — PREDNISONE 10 MG/1
10 TABLET ORAL DAILY
Status: DISCONTINUED | OUTPATIENT
Start: 2017-10-30 | End: 2017-10-30

## 2017-10-29 RX ORDER — HEPARIN SODIUM 5000 [USP'U]/ML
5000 INJECTION, SOLUTION INTRAVENOUS; SUBCUTANEOUS EVERY 8 HOURS SCHEDULED
Status: DISCONTINUED | OUTPATIENT
Start: 2017-10-29 | End: 2017-10-31

## 2017-10-29 RX ORDER — ASPIRIN 325 MG
325 TABLET, DELAYED RELEASE (ENTERIC COATED) ORAL ONCE
Status: COMPLETED | OUTPATIENT
Start: 2017-10-29 | End: 2017-10-29

## 2017-10-29 RX ORDER — PANTOPRAZOLE SODIUM 40 MG/1
40 TABLET, DELAYED RELEASE ORAL
Status: DISCONTINUED | OUTPATIENT
Start: 2017-10-30 | End: 2017-10-30

## 2017-10-29 RX ORDER — GUAIFENESIN 600 MG
600 TABLET, EXTENDED RELEASE 12 HR ORAL 2 TIMES DAILY
Status: DISCONTINUED | OUTPATIENT
Start: 2017-10-29 | End: 2017-10-31

## 2017-10-29 RX ORDER — FUROSEMIDE 20 MG/1
20 TABLET ORAL DAILY
Status: DISCONTINUED | OUTPATIENT
Start: 2017-10-30 | End: 2017-10-31

## 2017-10-29 RX ORDER — IPRATROPIUM BROMIDE AND ALBUTEROL SULFATE 2.5; .5 MG/3ML; MG/3ML
3 SOLUTION RESPIRATORY (INHALATION) ONCE
Status: COMPLETED | OUTPATIENT
Start: 2017-10-29 | End: 2017-10-29

## 2017-10-29 NOTE — ED PROVIDER NOTES
Patient Seen in: [facility] Emergency Department    History   Patient presents with:  Dizziness (neurologic)    Stated Complaint: dizziness/ fall    HPI    Patient presents with dizziness.   The patient states that he has felt a clogged sensation in stomach   • Eye Problems Brother      Eye disorder   • Other[other] [OTHER] Brother      Hearing loss       Smoking status: Former Smoker                                                              Packs/day: 1.00      Years: 40.00        Types: Cigarette Abnormal; Notable for the following:     PT 14.5 (*)     INR 1.12 (*)     All other components within normal limits   PTT, ACTIVATED - Abnormal; Notable for the following:     PTT 24.3 (*)     All other components within normal limits    Narrative:      The Data Registry) which includes the Dose Index Registry. PATIENT STATED HISTORY: (As transcribed by Technologist)  Sly Morrow after leg gave out. Light headed with pressure and dyspnea. History of COPD.     FINDINGS: No evidence of intracranial hemorrhage or extra 10/29/17 1610)   ipratropium-albuterol (DUONEB) nebulizer solution 3 mL (3 mL Nebulization Given 10/29/17 7423)     The patient was not called as a code stroke given the timing of the onset of his symptoms as well as his low NIH.   He was given a couple of

## 2017-10-29 NOTE — ED INITIAL ASSESSMENT (HPI)
Not feeling well since yesterday. Fall yesterday. Leg gave out. Did not want to go to [facility] to day. Been feeling weak. Complained of lightheaded today. Dyspnea on exertion. Left lower leg pain since morning.  Lost 20 lbs in 5 months      Looked into weight

## 2017-10-30 ENCOUNTER — APPOINTMENT (OUTPATIENT)
Dept: CT IMAGING | Facility: HOSPITAL | Age: 82
DRG: 074 | End: 2017-10-30
Attending: Other
Payer: MEDICARE

## 2017-10-30 PROCEDURE — 70450 CT HEAD/BRAIN W/O DYE: CPT | Performed by: OTHER

## 2017-10-30 PROCEDURE — 99233 SBSQ HOSP IP/OBS HIGH 50: CPT | Performed by: OTHER

## 2017-10-30 PROCEDURE — 72131 CT LUMBAR SPINE W/O DYE: CPT | Performed by: OTHER

## 2017-10-30 RX ORDER — PREDNISONE 20 MG/1
40 TABLET ORAL DAILY
Status: DISCONTINUED | OUTPATIENT
Start: 2017-10-31 | End: 2017-10-31

## 2017-10-30 RX ORDER — METHYLPREDNISOLONE SODIUM SUCCINATE 125 MG/2ML
60 INJECTION, POWDER, LYOPHILIZED, FOR SOLUTION INTRAMUSCULAR; INTRAVENOUS ONCE
Status: COMPLETED | OUTPATIENT
Start: 2017-10-30 | End: 2017-10-30

## 2017-10-30 RX ORDER — PANTOPRAZOLE SODIUM 40 MG/1
40 TABLET, DELAYED RELEASE ORAL
Status: DISCONTINUED | OUTPATIENT
Start: 2017-10-30 | End: 2017-10-31

## 2017-10-30 NOTE — PROGRESS NOTES
[id number] [facility] Neurology Progress Note    August Dillon Patient Status:  Inpatient    10/3/1932 MRN [id number]   [facility] 7NE-A Attending Zuhair Sanchez MD   Hosp Day # 1 PCP None Pcp         Subjective:  August Dillon is an 80 10/29/2017          Imaging:  Repeat CTH pending  CT L spine pending    [facility] 10/29/17:  IMPRESSION:  No significant change since prior exam. Sequelae of chronic small vessel ischemic disease is noted.  No evidence of intracranial hemorrhage or extra-axial fl muscle weakness in stroke. Continue ASA. Check lipid panel and CUS.     Audra Better, DO  Neurology and Neuromuscular medicine  [facility]  pager [phone]

## 2017-10-30 NOTE — CONSULTS
[facility]  Report of Consultation    Milton Myers Patient Status:  Inpatient    10/3/1932 MRN [id number]   [facility] 7NE-A Attending Junior Berkowitz MD   Hosp Day # 1 PCP None Pcp     Reason for Consultation:  Copd  Dyspnea alcohol. He reports that he does not use drugs. Allergies:    Dye [Radiology Cont*        Comment:Throat swells    Medications:      No current outpatient prescriptions on file.       Current Facility-Administered Medications:   •  Heparin Sodium (Porcin deficit    Vital signs in last 24 hours:  Patient Vitals for the past 24 hrs:   BP Temp Temp src Pulse Resp SpO2 Height Weight   10/30/17 0930 100/55 98 °F (36.7 °C) Oral 100 17 100 % - -   10/30/17 0600 - - - - - - - 162 lb 7.7 oz (73.7 kg)   10/30/17 050 for acute on Chronic Resp Failure  and MRSA Bronchitis- tx with Zyvox 10d, and repeated cx with Serratia species 10/11  3. O2 Dependent COPD- baseline 2-3 liters- follows with Dr. Temo Delgadillo, seems getting prednisone  dependent   4.  Severe Emphysema/ Bullous Dz

## 2017-10-30 NOTE — CONSULTS
[facility] Cardiology Consultation Natalia Fry MD    The patient was interviewed, examined, the chart was reviewed and the consult was dictated. This is a 80year old male with a chief complaint of left lower extremity weakness. Impression:  1.   Physical

## 2017-10-30 NOTE — PLAN OF CARE
No neuro deficits noted. Denies L leg weakness. No headache. Vpaced. CT of head and L/S in afternoon.       NEUROLOGICAL - ADULT    • Achieves stable or improved neurological status Progressing    • Achieves maximal functionality and self care Progressi

## 2017-10-30 NOTE — H&P
[facility] Hospitalist H&P       CC: Patient presents with:  Dizziness (neurologic)       PCP: None Pcp    History of Present Illness:  Mr. Spear Child is an 81 yo male with PMH of heart block s/p PPM, COPD (on 3 L NC at home) who presented to ED not feeling well.   P 600 MG Oral Tablet 12 Hr Take 1 tablet (600 mg total) by mouth 2 (two) times daily. Disp: 30 tablet Rfl: 0   Fluticasone Propionate 50 MCG/ACT Nasal Suspension 1 spray by Each Nare route daily.  Disp: 1 Bottle Rfl: 11   FUROSEMIDE 20 MG Oral Tab TAKE 1 TABL 107   Temp 98.3 °F (36.8 °C) (Oral)   Resp 19   Ht 5' 8\" (1.727 m)   Wt 159 lb (72.1 kg)   SpO2 98%   BMI 24.18 kg/m²     BP Readings from Last 3 Encounters:  10/29/17 : 120/70  10/13/17 : 117/66  07/30/17 : 137/66    Wt Readings from Last 3 Encounters: FINDINGS:   Extensive bullous emphysematous changes in the upper lungs again noted. The pacemaker leads are unchanged. There is fibrosis in the lower one half of both lungs. There is decreased interstitial infiltrate in the medial right lung base.  Stable AP chest radiograph was obtained. COMPARISON:  [facility] , XR CHEST PA + LAT CHEST (CPT=71020), 10/10/2017, 7:22. INDICATIONS:  dizziness/ fall  PATIENT STATED HISTORY: (As transcribed by Technologist)  Dyspnea, hx of COPD.     FINDINGS:   Triple lead left-s Patient offered no additional history at this time. FINDINGS:  Stable left-sided pacemaker. Cardiac size is upper limits normal. Hyperexpansion of the lungs. Small bilateral pleural effusions with bilateral basilar atelectasis/infiltrates.  Interstitial CT head ok, unable to do MRI as patient with incompatible PPM  - Repeat CT head tomorrow per neuro    # COPD  -- Continue home med  - Oxygen  - Nebs  - home prdnisone    # Afib  - Noted on PPM interrogation  - Monitor on tele  - pending findings on repeat

## 2017-10-30 NOTE — PHYSICAL THERAPY NOTE
PHYSICAL THERAPY EVALUATION - INPATIENT     Room Number: 2605/9665-E  Evaluation Date: 10/30/2017  Type of Evaluation: Initial  Physician Order: PT Eval and Treat    Presenting Problem: fall; acute left sided weakness  Reason for Therapy: Mobility Dysf sitting 3L when up and moving 6L    SUBJECTIVE  Agrees to ambulation    Patient self-stated goal is to return home    OBJECTIVE  Precautions: Other (Comment); Hard of hearing (O2 due to COPD)  Fall Risk: Standard fall risk    WEIGHT BEARING RESTRICTION  Kinga Thornton of Impairment Score: 35.83%   Standardized Score (AM-PAC Scale): 47.67   CMS Modifier (G-Code): CJ    FUNCTIONAL ABILITY STATUS  Gait Assessment   Gait Assistance: Dependent assistance (actual cga)  Distance (ft): 15  Assistive Device: None  Pattern: L Dec baseline and would benefit from skilled inpatient PT to address the above deficits to assist patient in returning to prior to level of function.   DISCHARGE RECOMMENDATIONS  PT Discharge Recommendations: Home with home health PT    PLAN  PT Treatment Plan:

## 2017-10-30 NOTE — CONSULTS
Neurology H&P    Amarjit Sanchez Patient Status:  Inpatient    10/3/1932 MRN [id number]   [facility] 7NE-A Attending Sunni Ojeda MD   Hosp Day # 0 PCP None Pcp     Subjective:  Amarjit Sanchez is a(n) 80year old male with a MPH sdignifi Cardiac pacemaker in situ     Postural dizziness with presyncope     Syncopal episodes     Acute bronchitis     Acute bronchitis, unspecified organism     Hemoptysis     Renal insufficiency     Chronic obstructive pulmonary disease (HCC)     Viral syndrome denies frequent urination or difficulty urinating   Heme: no new bruising, no unexplained fevers or chills  Endocrine: no unexplained weight loss or gain, no new fatigue  Musculoskeletal: denies back pain, denies joint pain  Psych: denies depression   Skin 10/29/2017   INR 1.12 10/29/2017   PTP 14.5 10/29/2017   TROP <0.046 10/29/2017       Imaging:  [facility] 10/29/17  FINDINGS:  No evidence of intracranial hemorrhage or extra-axial fluid collection.   Lucencies in the deep periventricular white matter are likely

## 2017-10-30 NOTE — PROGRESS NOTES
10/30/17 1300 10/30/17 1303 10/30/17 1305   Vital Signs   Pulse 95 101 107   Heart Rate Source Monitor Monitor Monitor   Cardiac Rhythm Ventricular paced Ventricular paced Ventricular paced   Resp 18 18 22   Respiratory Quality Normal Normal Labored   B

## 2017-10-30 NOTE — PROGRESS NOTES
[facility] Hospitalist Progress Note     [facility] Test Patient Status:  Emergency    10/10/1980 MRN [id number]   Location [address] Laury Dunn Attending No att. providers found   [facility] Day # 27 PCP No primary p 10/10/2017  PROCEDURE:  XR CHEST PA + LAT CHEST (CPT=71020)  INDICATIONS:  f/up infiltrate  COMPARISON:  [facility] , XR CHEST AP PORTABLE  (CPT=71010), 10/05/2017, 5:30.   [facility] , XR CHEST AP PORTABLE  (CPT=71010), 10/02/2017, 5:31.  TECHNIQUE:  PA and latera IMPRESSION: No significant change since prior exam. Sequelae of chronic small vessel ischemic disease is noted. No evidence of intracranial hemorrhage or extra-axial fluid collection.   Dictated by: Kristofer Jackson MD on 10/29/2017 at 15:46     Approved by: Sri Hallman MD            Xr Chest Ap Portable  (cpt=71010)    Result Date: 10/2/2017  PROCEDURE:  XR CHEST AP PORTABLE (CPT=71010)  TECHNIQUE:  AP chest radiograph was obtained.   COMPARISON:  [facility] XR CHEST AP PORTABLE  (CPT=71010), 10/01/2017 CONCLUSION:  1. Findings suggestive of bullous emphysematous changes of the right upper lobe with diffuse COPD. 2. Stable bibasilar pleural-parenchymal disease. 3. No significant interval change. Dictated by:  Irvin Erickson DO on 10/01/2017 at 7:33 dizziness and left leg weakness    Questions/concerns were discussed with patient and/or family by bedside.       Ruthann Riojas  Internal Medicine  [facility] Hospitalist  Pager: [phone]

## 2017-10-31 ENCOUNTER — APPOINTMENT (OUTPATIENT)
Dept: ULTRASOUND IMAGING | Facility: HOSPITAL | Age: 82
DRG: 074 | End: 2017-10-31
Attending: Other
Payer: MEDICARE

## 2017-10-31 VITALS
DIASTOLIC BLOOD PRESSURE: 75 MMHG | SYSTOLIC BLOOD PRESSURE: 119 MMHG | BODY MASS INDEX: 24.63 KG/M2 | HEART RATE: 106 BPM | TEMPERATURE: 99 F | RESPIRATION RATE: 20 BRPM | HEIGHT: 68 IN | WEIGHT: 162.5 LBS | OXYGEN SATURATION: 99 %

## 2017-10-31 PROCEDURE — 4A01X2B MEASUREMENT OF PERIPHERAL NERVOUS CONDUCTIVITY, MOTOR, EXTERNAL APPROACH: ICD-10-PCS | Performed by: OTHER

## 2017-10-31 PROCEDURE — 93880 EXTRACRANIAL BILAT STUDY: CPT | Performed by: OTHER

## 2017-10-31 PROCEDURE — 4A0FX3Z MEASUREMENT OF MUSCULOSKELETAL CONTRACTILITY, EXTERNAL APPROACH: ICD-10-PCS | Performed by: OTHER

## 2017-10-31 PROCEDURE — 95910 NRV CNDJ TEST 7-8 STUDIES: CPT | Performed by: OTHER

## 2017-10-31 PROCEDURE — 95886 MUSC TEST DONE W/N TEST COMP: CPT | Performed by: OTHER

## 2017-10-31 PROCEDURE — 4A01X29 MEASUREMENT OF PERIPHERAL NERVOUS CONDUCTIVITY, SENSORY, EXTERNAL APPROACH: ICD-10-PCS | Performed by: OTHER

## 2017-10-31 PROCEDURE — 99233 SBSQ HOSP IP/OBS HIGH 50: CPT | Performed by: OTHER

## 2017-10-31 RX ORDER — PREDNISONE 10 MG/1
TABLET ORAL
Qty: 120 TABLET | Refills: 0 | Status: SHIPPED | OUTPATIENT
Start: 2017-10-31 | End: 2017-12-13

## 2017-10-31 NOTE — PLAN OF CARE
R lower & mid lobes with expiratory wheezes, rest clear. 3L O2 NC used. Denies dyspnea. Noticeable improvement in ease of breathing with movement compared to night prior. No neuro deficits noted.       DISCHARGE PLANNING    • Discharge to home or othe

## 2017-10-31 NOTE — PROGRESS NOTES
[facility] Hospitalist Progress Note     [facility] Test Patient Status:  Emergency    10/10/1980 MRN [id number]   Location [address] Laury Dunn Attending No att. providers found   [facility] Day # 27 PCP No primary p (CPT=71010), 10/05/2017, 5:30. [facility] , XR CHEST AP PORTABLE  (CPT=71010), 10/02/2017, 5:31.  TECHNIQUE:  PA and lateral chest radiographs were obtained.   PATIENT STATED HISTORY: (As transcribed by Technologist)  Patient offered no additional history at t hemorrhage or extra-axial fluid collection.   Dictated by: Rufus Hatchet, MD on 10/29/2017 at 15:46     Approved by: Rufus Hatchet, MD            Xr Chest Ap Portable  (cpt=71010)    Result Date: 10/29/2017  PROCEDURE:  XR CHEST AP PORTABLE (CPT=710 (CPT=71010)  TECHNIQUE:  AP chest radiograph was obtained. COMPARISON:  [facility] , XR CHEST AP PORTABLE  (CPT=71010), 10/01/2017, 6:56. [facility] , XR CHEST AP PORTABLE  (CPT=71010), 9/29/2017, 4:19.   INDICATIONS:  dyspnea  PATIENT STATED HISTORY: (As transcr pleural-parenchymal disease. 3. No significant interval change. Dictated by: Sunil Nash DO on 10/01/2017 at 7:33     Approved by: Sunil Nash DO               Meds:     No current outpatient prescriptions on file.       Current Facility-Administered

## 2017-10-31 NOTE — PROGRESS NOTES
[facility]  Progress Note    Staci Owen Patient Status:  Inpatient    10/3/1932 MRN [id number]   [facility] 7NE-A Attending Vishal Payne, [address] Day # 2 PCP None Pcp     ASSESSMENT  1.  LLE weakness - as per neuro, EMG wit Hyperglycemia     Acute left-sided weakness     Dizziness     Fall, initial encounter     Neuropathy of left peroneal nerve      Subjective:  Milton Myers is a(n) 80year old male.   Walked this am , breathing is easier , but still not back to what it was results for input(s): ABGPHT, MJJSBQ9M, WFJNQ6X, ABGHCO3, ABGBE, TEMP, YUE, SITE, DEV, THGB in the last 72 hours.     Invalid input(s): PUJ01MKO, SHOLA    Cultures:    Radiology:      Medications Reviewed:    Current Facility-Administered Medications:  Pan

## 2017-10-31 NOTE — CM/SW NOTE
Pt seen for d/c planning and readmission. Pt a 80 y o male who is the father of Dr Aleyda Dangelo. He was readmitted for acute weakness and dizziness. Pt is  and lives with his son Ortiz Jesus in [place], [place].   Pt gets Home 02 thru [address] [place] [address] and has a nebulizer at h Current w/ Provider-List not needed Yes

## 2017-10-31 NOTE — PROGRESS NOTES
[facility] Cardiology Progress Note - [facility] Patient Status:  Inpatient    10/3/1932 MRN [id number]   [facility] 7NE-A Attending Phong Dover, DO   Hosp Day # 2 PCP None Pcp     Subjective:   The patient feel 0600 : 162 lb 7.7 oz (73.7 kg)  10/29/17 1416 : 159 lb (72.1 kg)  10/06/17 0522 : 167 lb 12.8 oz (76.1 kg)  10/02/17 1350 : 167 lb 9.6 oz (76 kg)  09/24/17 0446 : 171 lb 11.2 oz (77.9 kg)  09/20/17 0433 : 174 lb (78.9 kg)  09/19/17 1412 : 173 lb 3.2 oz (78 40 mg 40 mg Oral Daily   Heparin Sodium (Porcine) 5000 UNIT/ML injection 5,000 Units 5,000 Units Subcutaneous Q8H [address]   aspirin EC tab 81 mg 81 mg Oral Daily   Fluticasone Furoate-Vilanterol (BREO ELLIPTA) 200-25 MCG/INH inhaler 1 puff 1 puff Inhalation Da

## 2017-10-31 NOTE — PROGRESS NOTES
[id number] [facility] Neurology Progress Note    Gema Ochoa Patient Status:  Inpatient    10/3/1932 MRN [id number]   [facility] 7NE-A Attending Anthony Jean MD   Hosp Day # 2 PCP None Pcp         Subjective:  Gema Ochoa is an 80 examination. Sequelae of chronic small vessel ischemic disease is noted. No evidence of intracranial hemorrhage or extra-axial fluid collection. CT L spine 10/30/17:  CONCLUSION:    1. Grade 1 spondylolisthesis at L4-5 with facet joint arthropathy.   2. (chronic)    Recommendations:  Findings on EMG suggest the peroneal neuropathy is not acute, more subacute  Likely due to his recent weight loss and more exposed fibular head and frequent leg crossing  Asked that he avoid any such positions or leaning legs

## 2017-10-31 NOTE — PHYSICAL THERAPY NOTE
PHYSICAL THERAPY TREATMENT NOTE - INPATIENT    Room Number: 4577/2875-B     Session: 1   Number of Visits to Meet Established Goals: 3    Presenting Problem: fall; acute left sided weakness    Problem List  Principal Problem:    Acute left-sided weakness (including adjusting bedclothes, sheets and blankets)?: None   -   Sitting down on and standing up from a chair with arms (e.g., wheelchair, bedside commode, etc.): A Little   -   Moving from lying on back to sitting on the side of the bed?: None   How muc prescribed by this physical therapist. The rehab aide will communicate with overseeing PT regarding any change in functional mobility. RN aware.          DISCHARGE RECOMMENDATIONS  PT Discharge Recommendations: Home with home health PT     PLAN  PT Treatmen

## 2017-10-31 NOTE — CONSULTS
[facility]    PATIENT'S NAME: Pavel Sims   ATTENDING PHYSICIAN: Nick Ye MD   CONSULTING PHYSICIAN: Lorri Soler M.D.    PATIENT ACCOUNT#:   [de-identified]    LOCATION:  [address] [place] [address]  MEDICAL RECORD #:   [id number]       DATE OF BIRTH:  [birth year] HISTORY:  Significant for COPD, falls, second-degree heart block with pacemaker, chronic kidney disease, hypertension, hoarseness, syncope, bronchitis, esophageal reflux, high blood pressure, rotator cuff.   He had an angiogram in the past.    MEDICATIONS:

## 2017-10-31 NOTE — DISCHARGE SUMMARY
General Medicine Discharge Summary     Patient ID:  Luis Denver  80year old  10/3/1932    Admit date: 10/29/2017    Discharge date and time: 10/31/17    Attending Physician: DO Bri Hsieh admission - has f/u with pulmonary     # Afib  - Noted on PPM interrogation  - Monitor on tele     Consults: IP CONSULT TO NEUROLOGY  IP CONSULT TO HOSPITALIST  IP CONSULT TO PULMONOLOGY  IP CONSULT TO CARDIOLOGY    Operative Procedures:        Patient ins oriented x 3  Heart: RRR  Lungs: decreased air movement bilaterally, no active wheezing  Abdomen: nontender, nondistended, intact BS  Extremities: no pedal edema   Neuro: CN inact, no focal deficits    Total time coordinating care for discharge: Greater th

## 2017-10-31 NOTE — PROCEDURES
Nerve Conduction/Electromyography Report      [facility]   EMG department  [facility]  [address]  [place], [address] [place] [address] Rd  [phone]      Patient name: Brisa Parnell  YOB: 1932  Referring physician: Dr. Vonnie Parekh  Reason for study: Carmela Camara

## 2017-10-31 NOTE — PLAN OF CARE
Pt d/c home with daugher via wheelchair. All d/c instructions, rx, and med lists given and discussed. All questions encouraged an answered. IV d/c'd without s/s of in infiltration.

## 2017-10-31 NOTE — OCCUPATIONAL THERAPY NOTE
OCCUPATIONAL THERAPY QUICK EVALUATION - INPATIENT    Room Number: 1025/4393-V  Evaluation Date: 10/31/2017     Type of Evaluation: Quick Eval  Presenting Problem: fall, L LE weakness    Physician Order: IP Consult to Occupational Therapy  Reason for [facility] rest, 6 liters with activity. SUBJECTIVE   \"I am feeling better now. \"        OBJECTIVE  Precautions: Other (Comment); Hard of hearing (O2 due to COPD)  Fall Risk: Standard fall risk    WEIGHT BEARING RESTRICTION  Weight Bearing Restriction: None Session: Up in chair;Needs met;Call light within reach;RN aware of session/findings; All patient questions and concerns addressed    ASSESSMENT     Patient is a 80year old male admitted on 10/29/2017 for fall, L LE weakness.  Complete medical history and oc

## 2017-11-01 NOTE — CM/SW NOTE
11/01/17 0900   Discharge disposition   Discharged to: Home-Health   Name of Facillity/Home Care/Hospice ([address])   Home services after discharge Skilled home care   Discharge transportation Private car

## 2017-11-14 RX ORDER — LEVOFLOXACIN 500 MG/1
500 TABLET, FILM COATED ORAL DAILY
COMMUNITY
End: 2017-12-13

## 2017-11-14 RX ORDER — PREDNISONE 50 MG/1
50 TABLET ORAL DAILY
COMMUNITY
End: 2017-11-17

## 2017-11-17 ENCOUNTER — HOSPITAL ENCOUNTER (OUTPATIENT)
Dept: INTERVENTIONAL RADIOLOGY/VASCULAR | Facility: HOSPITAL | Age: 82
Discharge: HOME | End: 2017-11-17
Attending: INTERNAL MEDICINE | Admitting: INTERNAL MEDICINE
Payer: MEDICARE

## 2017-11-17 VITALS
OXYGEN SATURATION: 100 % | TEMPERATURE: 98 F | DIASTOLIC BLOOD PRESSURE: 54 MMHG | WEIGHT: 168 LBS | SYSTOLIC BLOOD PRESSURE: 99 MMHG | HEART RATE: 90 BPM | BODY MASS INDEX: 25.46 KG/M2 | RESPIRATION RATE: 17 BRPM | HEIGHT: 68 IN

## 2017-11-17 DIAGNOSIS — R07.9 CHEST PAIN: ICD-10-CM

## 2017-11-17 PROCEDURE — 93456 R HRT CORONARY ARTERY ANGIO: CPT

## 2017-11-17 PROCEDURE — 4A023N6 MEASUREMENT OF CARDIAC SAMPLING AND PRESSURE, RIGHT HEART, PERCUTANEOUS APPROACH: ICD-10-PCS | Performed by: INTERNAL MEDICINE

## 2017-11-17 PROCEDURE — 82375 ASSAY CARBOXYHB QUANT: CPT | Performed by: INTERNAL MEDICINE

## 2017-11-17 PROCEDURE — B211YZZ FLUOROSCOPY OF MULTIPLE CORONARY ARTERIES USING OTHER CONTRAST: ICD-10-PCS | Performed by: INTERNAL MEDICINE

## 2017-11-17 PROCEDURE — 85049 AUTOMATED PLATELET COUNT: CPT | Performed by: INTERNAL MEDICINE

## 2017-11-17 PROCEDURE — 83050 HGB METHEMOGLOBIN QUAN: CPT | Performed by: INTERNAL MEDICINE

## 2017-11-17 PROCEDURE — 99153 MOD SED SAME PHYS/QHP EA: CPT

## 2017-11-17 PROCEDURE — 80048 BASIC METABOLIC PNL TOTAL CA: CPT | Performed by: INTERNAL MEDICINE

## 2017-11-17 PROCEDURE — 85027 COMPLETE CBC AUTOMATED: CPT | Performed by: INTERNAL MEDICINE

## 2017-11-17 PROCEDURE — 85018 HEMOGLOBIN: CPT | Performed by: INTERNAL MEDICINE

## 2017-11-17 PROCEDURE — 82803 BLOOD GASES ANY COMBINATION: CPT | Performed by: INTERNAL MEDICINE

## 2017-11-17 PROCEDURE — 99152 MOD SED SAME PHYS/QHP 5/>YRS: CPT

## 2017-11-17 RX ORDER — MIDAZOLAM HYDROCHLORIDE 1 MG/ML
INJECTION INTRAMUSCULAR; INTRAVENOUS
Status: COMPLETED
Start: 2017-11-17 | End: 2017-11-17

## 2017-11-17 RX ORDER — DIPHENHYDRAMINE HYDROCHLORIDE 50 MG/ML
INJECTION INTRAMUSCULAR; INTRAVENOUS
Status: DISCONTINUED
Start: 2017-11-17 | End: 2017-11-17 | Stop reason: WASHOUT

## 2017-11-17 RX ORDER — SODIUM CHLORIDE 9 MG/ML
INJECTION, SOLUTION INTRAVENOUS CONTINUOUS
Status: DISCONTINUED | OUTPATIENT
Start: 2017-11-17 | End: 2017-11-17

## 2017-11-17 RX ORDER — DIPHENHYDRAMINE HYDROCHLORIDE 50 MG/ML
INJECTION INTRAMUSCULAR; INTRAVENOUS
Status: COMPLETED
Start: 2017-11-17 | End: 2017-11-17

## 2017-11-17 RX ORDER — HEPARIN SODIUM 5000 [USP'U]/ML
INJECTION, SOLUTION INTRAVENOUS; SUBCUTANEOUS
Status: COMPLETED
Start: 2017-11-17 | End: 2017-11-17

## 2017-11-17 RX ORDER — LIDOCAINE HYDROCHLORIDE 10 MG/ML
INJECTION, SOLUTION INFILTRATION; PERINEURAL
Status: COMPLETED
Start: 2017-11-17 | End: 2017-11-17

## 2017-11-17 RX ADMIN — DIPHENHYDRAMINE HYDROCHLORIDE 25 MG: 50 INJECTION INTRAMUSCULAR; INTRAVENOUS at 08:50:00

## 2017-11-17 RX ADMIN — SODIUM CHLORIDE 250 ML/HR: 9 INJECTION, SOLUTION INTRAVENOUS at 07:45:00

## 2017-11-17 NOTE — PROGRESS NOTES
Discharge instructions explained to the patient and family, questions were answered,vitasl are stable, walked in the room,right femoral area is clean and dry, patient, iv removed, patient was taken in a wheelchair to the car and went home in stable conditi

## 2017-11-17 NOTE — PROCEDURES
[facility]    PATIENT'S NAME: Tomas Lemos   ATTENDING PHYSICIAN: Gareth Lee M.D. OPERATING PHYSICIAN: Dylan Demarco M.D.    PATIENT ACCOUNT#:   [de-identified]    [facility]  MEDICAL RECORD #:   [id number]       DATE OF B posterior left ventricular branch. The left circumflex artery system is essentially normal.  There is a large ramus intermedius vessel present which is normal.  The LAD is a medium caliber vessel. It gives rise to a medium size diagonal branch.   The LAD

## 2017-12-13 ENCOUNTER — APPOINTMENT (OUTPATIENT)
Dept: GENERAL RADIOLOGY | Facility: HOSPITAL | Age: 82
DRG: 314 | End: 2017-12-13
Attending: EMERGENCY MEDICINE
Payer: MEDICARE

## 2017-12-13 ENCOUNTER — HOSPITAL ENCOUNTER (OUTPATIENT)
Dept: ULTRASOUND IMAGING | Facility: HOSPITAL | Age: 82
Discharge: HOME | DRG: 314 | End: 2017-12-13
Attending: INTERNAL MEDICINE
Payer: MEDICARE

## 2017-12-13 ENCOUNTER — APPOINTMENT (OUTPATIENT)
Dept: NUCLEAR MEDICINE | Facility: HOSPITAL | Age: 82
DRG: 314 | End: 2017-12-13
Attending: EMERGENCY MEDICINE
Payer: MEDICARE

## 2017-12-13 ENCOUNTER — APPOINTMENT (OUTPATIENT)
Dept: CV DIAGNOSTICS | Facility: HOSPITAL | Age: 82
DRG: 314 | End: 2017-12-13
Attending: EMERGENCY MEDICINE
Payer: MEDICARE

## 2017-12-13 ENCOUNTER — HOSPITAL ENCOUNTER (INPATIENT)
Facility: HOSPITAL | Age: 82
LOS: 2 days | Discharge: HOME | DRG: 314 | End: 2017-12-16
Attending: EMERGENCY MEDICINE | Admitting: INTERNAL MEDICINE
Payer: MEDICARE

## 2017-12-13 DIAGNOSIS — M79.89 LEG SWELLING: ICD-10-CM

## 2017-12-13 DIAGNOSIS — I82.432 ACUTE DEEP VEIN THROMBOSIS (DVT) OF POPLITEAL VEIN OF LEFT LOWER EXTREMITY (HCC): Primary | ICD-10-CM

## 2017-12-13 DIAGNOSIS — I10 ESSENTIAL HYPERTENSION: ICD-10-CM

## 2017-12-13 DIAGNOSIS — N18.30 CKD (CHRONIC KIDNEY DISEASE) STAGE 3, GFR 30-59 ML/MIN (HCC): ICD-10-CM

## 2017-12-13 LAB
ALBUMIN SERPL-MCNC: 3.7 G/DL (ref 3.5–4.8)
ALP LIVER SERPL-CCNC: 58 U/L (ref 45–117)
ALT SERPL-CCNC: 37 U/L (ref 17–63)
APTT PPP: 210.9 SECONDS (ref 25–34)
APTT PPP: 27.6 SECONDS (ref 25–34)
AST SERPL-CCNC: 34 U/L (ref 15–41)
BASOPHILS # BLD AUTO: 0.02 X10(3) UL (ref 0–0.1)
BASOPHILS NFR BLD AUTO: 0.2 %
BILIRUB SERPL-MCNC: 0.8 MG/DL (ref 0.1–2)
BUN BLD-MCNC: 25 MG/DL (ref 8–20)
CALCIUM BLD-MCNC: 9.7 MG/DL (ref 8.3–10.3)
CHLORIDE: 106 MMOL/L (ref 101–111)
CO2: 29 MMOL/L (ref 22–32)
CREAT BLD-MCNC: 1.68 MG/DL (ref 0.7–1.3)
EOSINOPHIL # BLD AUTO: 0.01 X10(3) UL (ref 0–0.3)
EOSINOPHIL NFR BLD AUTO: 0.1 %
ERYTHROCYTE [DISTWIDTH] IN BLOOD BY AUTOMATED COUNT: 15.9 % (ref 11.5–16)
GLUCOSE BLD-MCNC: 103 MG/DL (ref 70–99)
HCT VFR BLD AUTO: 38.3 % (ref 37–53)
HGB BLD-MCNC: 12.5 G/DL (ref 13–17)
IMMATURE GRANULOCYTE COUNT: 0.14 X10(3) UL (ref 0–1)
IMMATURE GRANULOCYTE RATIO %: 1.3 %
LYMPHOCYTES # BLD AUTO: 0.57 X10(3) UL (ref 0.9–4)
LYMPHOCYTES NFR BLD AUTO: 5.4 %
M PROTEIN MFR SERPL ELPH: 7.4 G/DL (ref 6.1–8.3)
MCH RBC QN AUTO: 31.3 PG (ref 27–33.2)
MCHC RBC AUTO-ENTMCNC: 32.6 G/DL (ref 31–37)
MCV RBC AUTO: 96 FL (ref 80–99)
MONOCYTES # BLD AUTO: 0.56 X10(3) UL (ref 0.1–0.6)
MONOCYTES NFR BLD AUTO: 5.3 %
NEUTROPHIL ABS PRELIM: 9.17 X10 (3) UL (ref 1.3–6.7)
NEUTROPHILS # BLD AUTO: 9.17 X10(3) UL (ref 1.3–6.7)
NEUTROPHILS NFR BLD AUTO: 87.7 %
PLATELET # BLD AUTO: 215 10(3)UL (ref 150–450)
POTASSIUM SERPL-SCNC: 5.2 MMOL/L (ref 3.6–5.1)
PRO-BETA NATRIURETIC PEPTIDE: 941 PG/ML (ref ?–450)
RBC # BLD AUTO: 3.99 X10(6)UL (ref 3.8–5.8)
RED CELL DISTRIBUTION WIDTH-SD: 57.1 FL (ref 35.1–46.3)
SODIUM SERPL-SCNC: 139 MMOL/L (ref 136–144)
TROPONIN: <0.046 NG/ML (ref ?–0.05)
WBC # BLD AUTO: 10.5 X10(3) UL (ref 4–13)

## 2017-12-13 PROCEDURE — 93010 ELECTROCARDIOGRAM REPORT: CPT

## 2017-12-13 PROCEDURE — 96366 THER/PROPH/DIAG IV INF ADDON: CPT

## 2017-12-13 PROCEDURE — 94640 AIRWAY INHALATION TREATMENT: CPT

## 2017-12-13 PROCEDURE — 80053 COMPREHEN METABOLIC PANEL: CPT | Performed by: EMERGENCY MEDICINE

## 2017-12-13 PROCEDURE — 93306 TTE W/DOPPLER COMPLETE: CPT | Performed by: EMERGENCY MEDICINE

## 2017-12-13 PROCEDURE — 85025 COMPLETE CBC W/AUTO DIFF WBC: CPT | Performed by: EMERGENCY MEDICINE

## 2017-12-13 PROCEDURE — 78582 LUNG VENTILAT&PERFUS IMAGING: CPT | Performed by: EMERGENCY MEDICINE

## 2017-12-13 PROCEDURE — 83880 ASSAY OF NATRIURETIC PEPTIDE: CPT | Performed by: EMERGENCY MEDICINE

## 2017-12-13 PROCEDURE — 96365 THER/PROPH/DIAG IV INF INIT: CPT

## 2017-12-13 PROCEDURE — 84484 ASSAY OF TROPONIN QUANT: CPT | Performed by: EMERGENCY MEDICINE

## 2017-12-13 PROCEDURE — 99285 EMERGENCY DEPT VISIT HI MDM: CPT

## 2017-12-13 PROCEDURE — 93005 ELECTROCARDIOGRAM TRACING: CPT

## 2017-12-13 PROCEDURE — 93971 EXTREMITY STUDY: CPT | Performed by: INTERNAL MEDICINE

## 2017-12-13 PROCEDURE — 85730 THROMBOPLASTIN TIME PARTIAL: CPT | Performed by: EMERGENCY MEDICINE

## 2017-12-13 PROCEDURE — 71010 XR CHEST AP PORTABLE  (CPT=71010): CPT | Performed by: EMERGENCY MEDICINE

## 2017-12-13 PROCEDURE — 85730 THROMBOPLASTIN TIME PARTIAL: CPT | Performed by: INTERNAL MEDICINE

## 2017-12-13 RX ORDER — IPRATROPIUM BROMIDE AND ALBUTEROL SULFATE 2.5; .5 MG/3ML; MG/3ML
3 SOLUTION RESPIRATORY (INHALATION)
Status: DISCONTINUED | OUTPATIENT
Start: 2017-12-13 | End: 2017-12-14

## 2017-12-13 RX ORDER — HEPARIN SODIUM AND DEXTROSE 10000; 5 [USP'U]/100ML; G/100ML
INJECTION INTRAVENOUS CONTINUOUS
Status: DISCONTINUED | OUTPATIENT
Start: 2017-12-13 | End: 2017-12-15 | Stop reason: ALTCHOICE

## 2017-12-13 RX ORDER — POLYETHYLENE GLYCOL 3350 17 G/17G
17 POWDER, FOR SOLUTION ORAL DAILY PRN
Status: DISCONTINUED | OUTPATIENT
Start: 2017-12-13 | End: 2017-12-16

## 2017-12-13 RX ORDER — MULTIPLE VITAMINS W/ MINERALS TAB 9MG-400MCG
1 TAB ORAL DAILY
Status: DISCONTINUED | OUTPATIENT
Start: 2017-12-14 | End: 2017-12-16

## 2017-12-13 RX ORDER — CHLORAL HYDRATE 500 MG
1000 CAPSULE ORAL DAILY
COMMUNITY
End: 2017-12-16

## 2017-12-13 RX ORDER — ACETAMINOPHEN 325 MG/1
650 TABLET ORAL EVERY 6 HOURS PRN
Status: DISCONTINUED | OUTPATIENT
Start: 2017-12-13 | End: 2017-12-16

## 2017-12-13 RX ORDER — HEPARIN SODIUM 5000 [USP'U]/ML
80 INJECTION INTRAVENOUS; SUBCUTANEOUS ONCE
Status: COMPLETED | OUTPATIENT
Start: 2017-12-13 | End: 2017-12-13

## 2017-12-13 RX ORDER — BIOTIN 10000 MCG
10 CAPSULE ORAL DAILY
COMMUNITY

## 2017-12-13 RX ORDER — CHLORAL HYDRATE 500 MG
1000 CAPSULE ORAL DAILY
Status: DISCONTINUED | OUTPATIENT
Start: 2017-12-14 | End: 2017-12-13 | Stop reason: RX

## 2017-12-13 RX ORDER — VITAMIN E 268 MG
400 CAPSULE ORAL DAILY
Status: DISCONTINUED | OUTPATIENT
Start: 2017-12-14 | End: 2017-12-16

## 2017-12-13 RX ORDER — ONDANSETRON 2 MG/ML
4 INJECTION INTRAMUSCULAR; INTRAVENOUS EVERY 6 HOURS PRN
Status: DISCONTINUED | OUTPATIENT
Start: 2017-12-13 | End: 2017-12-16

## 2017-12-13 RX ORDER — FUROSEMIDE 20 MG/1
20 TABLET ORAL DAILY
Status: DISCONTINUED | OUTPATIENT
Start: 2017-12-14 | End: 2017-12-15

## 2017-12-13 RX ORDER — FLUTICASONE PROPIONATE 50 MCG
1 SPRAY, SUSPENSION (ML) NASAL DAILY
Status: DISCONTINUED | OUTPATIENT
Start: 2017-12-14 | End: 2017-12-16

## 2017-12-13 RX ORDER — FUROSEMIDE 20 MG/1
20 TABLET ORAL DAILY
Status: ON HOLD | COMMUNITY
End: 2017-12-16 | Stop reason: DRUGHIGH

## 2017-12-13 RX ORDER — PANTOPRAZOLE SODIUM 40 MG/1
40 TABLET, DELAYED RELEASE ORAL
Status: DISCONTINUED | OUTPATIENT
Start: 2017-12-14 | End: 2017-12-16

## 2017-12-13 RX ORDER — PREDNISONE 20 MG/1
60 TABLET ORAL ONCE
Status: COMPLETED | OUTPATIENT
Start: 2017-12-13 | End: 2017-12-13

## 2017-12-13 RX ORDER — ENOXAPARIN SODIUM 100 MG/ML
80 INJECTION SUBCUTANEOUS ONCE
Status: DISCONTINUED | OUTPATIENT
Start: 2017-12-13 | End: 2017-12-13

## 2017-12-13 RX ORDER — BISACODYL 10 MG
10 SUPPOSITORY, RECTAL RECTAL
Status: DISCONTINUED | OUTPATIENT
Start: 2017-12-13 | End: 2017-12-16

## 2017-12-13 RX ORDER — BIOTIN 10000 MCG
10 CAPSULE ORAL DAILY
Status: DISCONTINUED | OUTPATIENT
Start: 2017-12-14 | End: 2017-12-13 | Stop reason: RX

## 2017-12-13 RX ORDER — HEPARIN SODIUM AND DEXTROSE 10000; 5 [USP'U]/100ML; G/100ML
18 INJECTION INTRAVENOUS ONCE
Status: COMPLETED | OUTPATIENT
Start: 2017-12-13 | End: 2017-12-14

## 2017-12-13 RX ORDER — PREDNISONE 20 MG/1
20 TABLET ORAL DAILY
Status: ON HOLD | COMMUNITY
End: 2018-01-01

## 2017-12-13 RX ORDER — PREDNISONE 20 MG/1
20 TABLET ORAL DAILY
Status: DISCONTINUED | OUTPATIENT
Start: 2017-12-14 | End: 2017-12-16

## 2017-12-13 RX ORDER — ASPIRIN 81 MG/1
81 TABLET ORAL DAILY
Status: DISCONTINUED | OUTPATIENT
Start: 2017-12-14 | End: 2017-12-16

## 2017-12-13 NOTE — H&P
[facility] Hospitalist H&P       CC: LLE DVT and acutely worsening sob    PCP: None Pcp    History of Present Illness: Pt is an 79 yo with chronic respiratory failure on 2-3L NC secondary to severe COPD,  AV heart block s/p PPM,  HTN, CKD III, who is admit Furoate-Vilanterol (BREO ELLIPTA) 200-25 MCG/INH Inhalation Aerosol Powder, Breath Activated Inhale 1 puff into the lungs daily.  Disp: 1 each Rfl: 6   Umeclidinium Bromide 62.5 MCG/INH Inhalation Aerosol Powder, Breath Activated Inhale 1 puff into the lung throat:  external ears and nose within normal limits, hearing intact       Neck: Supple, symmetrical   Lungs:   Diminished, no wheezing, fair effort    Chest wall:  No tenderness or deformity.    Heart:  Regular rate and rhythm, S1, S2 normal,+1-2 LLE edema Connie Bolanos MD on 12/13/2017 at 16:03     Approved by: Connie Bolanos MD               ASSESSMENT / PLAN:        Pt is an 79 yo with chronic respiratory failure on 2-3L NC secondary to severe COPD,  AV heart block s/p PPM,  HTN, CKD III, who is

## 2017-12-13 NOTE — ED PROVIDER NOTES
Patient Seen in: [facility] Emergency Department    History   Patient presents with:  Deep Vein Thrombosis (cardiovascular)    Stated Complaint: + DVT, sent for admit    HPI    44-year-old male, medical history as noted below, here with recently agustina Exam   ED Triage Vitals [12/13/17 1528]  BP: 126/76  Pulse: 95  Resp: 18  Temp: 97.6 °F (36.4 °C)  Temp src: Temporal  SpO2: 95 %  O2 Device: Nasal cannula    Current:/100   Pulse 99   Temp 97.6 °F (36.4 °C) (Temporal)   Resp (!) 97   Ht 172.7 cm (5' CBC WITH DIFFERENTIAL WITH PLATELET    Narrative: The following orders were created for panel order CBC WITH DIFFERENTIAL WITH PLATELET.   Procedure                               Abnormality         Status                     --------- 12/13/2017. Critical results were read back.    Dictated by: Perfecto Carranza MD on 12/13/2017 at 14:56     Approved by: Perfecto Carranza MD            Xr Chest Ap Portable  (cpt=71010)    Result Date: 12/13/2017  CONCLUSION:  No significant changes since elvie

## 2017-12-13 NOTE — CONSULTS
[facility] [address] Note [facility]  Report of Consultation    Israsimon Dante Patient Status:  Emergency    10/3/1932 MRN [id number]   Location [facility] [facility] Attendin significant weight changes, but has noticed worsening abdominal distention over the last month. No f/c/s  No change in chronic baseline cough productive of white/yellow phlegm. No hemoptysis.    Has been on prednisone taper since hospital discharge - wes loss, tinnitus, snoring, sore throat, hoarseness and voice change. +sinus congestion  Respiratory: Negative for hemoptysis, or stridor.   Note HPI  Cardiovascular: Negative for palpitations, irregular heart beats, syncope, fatigue, orthopnea, paroxysmal no deformity or scars, no tenderness to palpation  PULMONARY:distant breath sounds bilaterally, rare wheezing noted bilaterally, no rhonchi or crackles heard  COR: RRR no murmur  GI: NABS X 4, S/NT/ND, No hernias or HSM  LYMPHATIC: No palpable or visible lymp Lisa Laureano MD on 12/13/2017 at 16:03     Approved by: Kathya Cavazos MD            ASSESSMENT    · Dyspnea, chest pain - concern for pulmonary embolism given newly diagnosed LLE DVT, although differential also includes COPD exacerbation or heart failure e

## 2017-12-14 LAB
APTT PPP: 130.4 SECONDS (ref 25–34)
APTT PPP: 66 SECONDS (ref 25–34)
BASOPHILS # BLD AUTO: 0.01 X10(3) UL (ref 0–0.1)
BASOPHILS NFR BLD AUTO: 0.1 %
BILIRUB UR QL STRIP.AUTO: NEGATIVE
BUN BLD-MCNC: 29 MG/DL (ref 8–20)
CALCIUM BLD-MCNC: 8.7 MG/DL (ref 8.3–10.3)
CHLORIDE: 109 MMOL/L (ref 101–111)
CLARITY UR REFRACT.AUTO: CLEAR
CO2: 26 MMOL/L (ref 22–32)
COLOR UR AUTO: YELLOW
CREAT BLD-MCNC: 1.42 MG/DL (ref 0.7–1.3)
EOSINOPHIL # BLD AUTO: 0.02 X10(3) UL (ref 0–0.3)
EOSINOPHIL NFR BLD AUTO: 0.2 %
ERYTHROCYTE [DISTWIDTH] IN BLOOD BY AUTOMATED COUNT: 15.6 % (ref 11.5–16)
GLUCOSE BLD-MCNC: 130 MG/DL (ref 70–99)
GLUCOSE UR STRIP.AUTO-MCNC: 50 MG/DL
HAV IGM SER QL: 2.5 MG/DL (ref 1.7–3)
HCT VFR BLD AUTO: 30.7 % (ref 37–53)
HGB BLD-MCNC: 10.2 G/DL (ref 13–17)
IMMATURE GRANULOCYTE COUNT: 0.15 X10(3) UL (ref 0–1)
IMMATURE GRANULOCYTE RATIO %: 1.4 %
KETONES UR STRIP.AUTO-MCNC: NEGATIVE MG/DL
LEUKOCYTE ESTERASE UR QL STRIP.AUTO: NEGATIVE
LYMPHOCYTES # BLD AUTO: 0.83 X10(3) UL (ref 0.9–4)
LYMPHOCYTES NFR BLD AUTO: 7.9 %
MCH RBC QN AUTO: 31.5 PG (ref 27–33.2)
MCHC RBC AUTO-ENTMCNC: 33.2 G/DL (ref 31–37)
MCV RBC AUTO: 94.8 FL (ref 80–99)
MONOCYTES # BLD AUTO: 0.87 X10(3) UL (ref 0.1–0.6)
MONOCYTES NFR BLD AUTO: 8.2 %
NEUTROPHIL ABS PRELIM: 8.67 X10 (3) UL (ref 1.3–6.7)
NEUTROPHILS # BLD AUTO: 8.67 X10(3) UL (ref 1.3–6.7)
NEUTROPHILS NFR BLD AUTO: 82.2 %
NITRITE UR QL STRIP.AUTO: NEGATIVE
PH UR STRIP.AUTO: 6 [PH] (ref 4.5–8)
PLATELET # BLD AUTO: 181 10(3)UL (ref 150–450)
POTASSIUM SERPL-SCNC: 4.6 MMOL/L (ref 3.6–5.1)
PROT UR STRIP.AUTO-MCNC: NEGATIVE MG/DL
RBC # BLD AUTO: 3.24 X10(6)UL (ref 3.8–5.8)
RBC UR QL AUTO: NEGATIVE
RED CELL DISTRIBUTION WIDTH-SD: 54.4 FL (ref 35.1–46.3)
SODIUM SERPL-SCNC: 142 MMOL/L (ref 136–144)
SP GR UR STRIP.AUTO: 1.02 (ref 1–1.03)
UROBILINOGEN UR STRIP.AUTO-MCNC: <2 MG/DL
WBC # BLD AUTO: 10.6 X10(3) UL (ref 4–13)

## 2017-12-14 PROCEDURE — 94664 DEMO&/EVAL PT USE INHALER: CPT

## 2017-12-14 PROCEDURE — 81003 URINALYSIS AUTO W/O SCOPE: CPT | Performed by: INTERNAL MEDICINE

## 2017-12-14 PROCEDURE — 94640 AIRWAY INHALATION TREATMENT: CPT

## 2017-12-14 PROCEDURE — 83735 ASSAY OF MAGNESIUM: CPT | Performed by: HOSPITALIST

## 2017-12-14 PROCEDURE — 80048 BASIC METABOLIC PNL TOTAL CA: CPT | Performed by: HOSPITALIST

## 2017-12-14 PROCEDURE — 85730 THROMBOPLASTIN TIME PARTIAL: CPT | Performed by: INTERNAL MEDICINE

## 2017-12-14 PROCEDURE — 85025 COMPLETE CBC W/AUTO DIFF WBC: CPT | Performed by: HOSPITALIST

## 2017-12-14 RX ORDER — LOSARTAN POTASSIUM 25 MG/1
12.5 TABLET ORAL DAILY
Status: DISCONTINUED | OUTPATIENT
Start: 2017-12-14 | End: 2017-12-15

## 2017-12-14 RX ORDER — IPRATROPIUM BROMIDE AND ALBUTEROL SULFATE 2.5; .5 MG/3ML; MG/3ML
3 SOLUTION RESPIRATORY (INHALATION)
Status: DISCONTINUED | OUTPATIENT
Start: 2017-12-14 | End: 2017-12-16

## 2017-12-14 RX ORDER — IPRATROPIUM BROMIDE AND ALBUTEROL SULFATE 2.5; .5 MG/3ML; MG/3ML
3 SOLUTION RESPIRATORY (INHALATION)
Status: DISCONTINUED | OUTPATIENT
Start: 2017-12-14 | End: 2017-12-14

## 2017-12-14 NOTE — PROGRESS NOTES
Pharmacy Note: Dietary Supplement Discontinuation Per Policy    Biotin and omega 3 fatty acids (fish oil) have been discontinued on Dada Copping per policy. These supplements may be restarted upon discharge using the medication reconciliation process.

## 2017-12-14 NOTE — PROGRESS NOTES
[facility]  Progress Note    Candace Carlton Patient Status:  Observation    10/3/1932 MRN [id number]   [facility] 3NE-A Attending Spenser Vincent, *   Hosp Day # 0 PCP None Pcp     Subjective:  Candace Carlton is a(n) 80year old Problem List:     COPD (chronic obstructive pulmonary disease) (HCC)     AV block, 2nd degree     Pacemaker     CKD (chronic kidney disease) stage 3, GFR 30-59 ml/min     Hypertension     Pneumonia     Notalgia paresthetica     Inflamed seborrheic keratosi

## 2017-12-14 NOTE — CONSULTS
[facility] Cardiology Consultation Maicol Khanna MD    The patient was interviewed, examined, the chart was reviewed and the consult was dictated. This is a 80year old male with a chief complaint of swollen leg, worsening shortness of breath.     Impression:

## 2017-12-14 NOTE — PROGRESS NOTES
12/14/17 1112   Clinical Encounter Type   Visited With Patient   Continue Visiting ( to remain available at pager [phone].)   Sacramental Encounters   Sacrament of Sick-Anointing Anointed  (Father Daysi Caridad provided prayer, Scripture, support and [employer]

## 2017-12-14 NOTE — RESPIRATORY THERAPY NOTE
Progressing. Patient to continue on duoneb trtms, and follow protocol. Oxygen saturations are 96% on 3 L/m  Patient getting neb via aerosol mask.    Breath sounds are diminished with expiratory wheezes

## 2017-12-14 NOTE — PROGRESS NOTES
[facility] Hospitalist Progress Note     PCP: None Pcp    CC:  Follow up    SUBJECTIVE:  Sitting up in bed. Says became extremely sob walking to restroom.       OBJECTIVE:  Temp:  [97.6 °F (36.4 °C)-98.1 °F (36.7 °C)] 98.1 °F (36.7 °C)  Pulse:  [] 111  Res Furoate-Vilanterol  1 puff Inhalation Daily   • Umeclidinium Bromide  1 puff Inhalation Daily   • aspirin  81 mg Oral Daily   • Fluticasone Propionate  1 spray Each Nare Daily   • furosemide  20 mg Oral Daily   • multivitamin with minerals  1 tablet Oral D

## 2017-12-14 NOTE — PROGRESS NOTES
NURSING ADMISSION NOTE      Patient admitted via Cart  Oriented to room. Safety precautions initiated. Bed in low position. Call light in reach. Completed admission with BLOSSOM Velez who is cardiologist at [facility].  Talked about code status

## 2017-12-14 NOTE — PROGRESS NOTES
Patient alert and oriented x4. Heparin drip currently infusing. Patient reports swelling to LLE is better today. Denies pain at this time. V paced on the monitor.

## 2017-12-15 ENCOUNTER — APPOINTMENT (OUTPATIENT)
Dept: GENERAL RADIOLOGY | Facility: HOSPITAL | Age: 82
DRG: 314 | End: 2017-12-15
Attending: INTERNAL MEDICINE
Payer: MEDICARE

## 2017-12-15 LAB
APTT PPP: 28.5 SECONDS (ref 25–34)
ATRIAL RATE: 100 BPM
BASOPHILS # BLD AUTO: 0.04 X10(3) UL (ref 0–0.1)
BASOPHILS NFR BLD AUTO: 0.4 %
BUN BLD-MCNC: 27 MG/DL (ref 8–20)
CALCIUM BLD-MCNC: 9.1 MG/DL (ref 8.3–10.3)
CHLORIDE: 105 MMOL/L (ref 101–111)
CO2: 27 MMOL/L (ref 22–32)
CREAT BLD-MCNC: 1.61 MG/DL (ref 0.7–1.3)
EOSINOPHIL # BLD AUTO: 0.1 X10(3) UL (ref 0–0.3)
EOSINOPHIL NFR BLD AUTO: 1.1 %
ERYTHROCYTE [DISTWIDTH] IN BLOOD BY AUTOMATED COUNT: 15.8 % (ref 11.5–16)
GLUCOSE BLD-MCNC: 71 MG/DL (ref 70–99)
HCT VFR BLD AUTO: 34.9 % (ref 37–53)
HGB BLD-MCNC: 11.5 G/DL (ref 13–17)
IMMATURE GRANULOCYTE COUNT: 0.22 X10(3) UL (ref 0–1)
IMMATURE GRANULOCYTE RATIO %: 2.3 %
LYMPHOCYTES # BLD AUTO: 1.44 X10(3) UL (ref 0.9–4)
LYMPHOCYTES NFR BLD AUTO: 15.2 %
MCH RBC QN AUTO: 31.1 PG (ref 27–33.2)
MCHC RBC AUTO-ENTMCNC: 33 G/DL (ref 31–37)
MCV RBC AUTO: 94.3 FL (ref 80–99)
MONOCYTES # BLD AUTO: 0.83 X10(3) UL (ref 0.1–0.6)
MONOCYTES NFR BLD AUTO: 8.8 %
NEUTROPHIL ABS PRELIM: 6.84 X10 (3) UL (ref 1.3–6.7)
NEUTROPHILS # BLD AUTO: 6.84 X10(3) UL (ref 1.3–6.7)
NEUTROPHILS NFR BLD AUTO: 72.2 %
P AXIS: 41 DEGREES
P-R INTERVAL: 130 MS
PLATELET # BLD AUTO: 232 10(3)UL (ref 150–450)
POTASSIUM SERPL-SCNC: 4.1 MMOL/L (ref 3.6–5.1)
PRO-BETA NATRIURETIC PEPTIDE: 825 PG/ML (ref ?–450)
Q-T INTERVAL: 394 MS
QRS DURATION: 154 MS
QTC CALCULATION (BEZET): 508 MS
R AXIS: -77 DEGREES
RBC # BLD AUTO: 3.7 X10(6)UL (ref 3.8–5.8)
RED CELL DISTRIBUTION WIDTH-SD: 54.4 FL (ref 35.1–46.3)
SODIUM SERPL-SCNC: 141 MMOL/L (ref 136–144)
T AXIS: 74 DEGREES
VENTRICULAR RATE: 100 BPM
WBC # BLD AUTO: 9.5 X10(3) UL (ref 4–13)

## 2017-12-15 PROCEDURE — 85730 THROMBOPLASTIN TIME PARTIAL: CPT | Performed by: HOSPITALIST

## 2017-12-15 PROCEDURE — 85025 COMPLETE CBC W/AUTO DIFF WBC: CPT | Performed by: INTERNAL MEDICINE

## 2017-12-15 PROCEDURE — 94640 AIRWAY INHALATION TREATMENT: CPT

## 2017-12-15 PROCEDURE — 83880 ASSAY OF NATRIURETIC PEPTIDE: CPT | Performed by: INTERNAL MEDICINE

## 2017-12-15 PROCEDURE — 80048 BASIC METABOLIC PNL TOTAL CA: CPT | Performed by: INTERNAL MEDICINE

## 2017-12-15 PROCEDURE — 71010 XR CHEST AP PORTABLE  (CPT=71010): CPT | Performed by: INTERNAL MEDICINE

## 2017-12-15 RX ORDER — LOSARTAN POTASSIUM 25 MG/1
12.5 TABLET ORAL 2 TIMES DAILY
Status: DISCONTINUED | OUTPATIENT
Start: 2017-12-15 | End: 2017-12-16

## 2017-12-15 RX ORDER — FUROSEMIDE 10 MG/ML
40 INJECTION INTRAMUSCULAR; INTRAVENOUS DAILY
Status: COMPLETED | OUTPATIENT
Start: 2017-12-15 | End: 2017-12-16

## 2017-12-15 NOTE — CM/SW NOTE
SW met with pt and provided Medicare Part D information, [facility] discount medication list and [facility] Pharm[facility] information. [facility] also confirmed pt is current with [facility], referral with resumption order for RN and PT sent.

## 2017-12-15 NOTE — CM/SW NOTE
Patient was screened during rounds and no needs are identified at this time. RN to contact SW/CM if needs arise. 12/15/17 1000   CM/SW Screening   Referral Source Social Work (self-referral)   [address] staff; Chart review;Nursing rounds

## 2017-12-15 NOTE — PROGRESS NOTES
[facility] Hospitalist Progress Note     PCP: None Pcp    CC:  Follow up    SUBJECTIVE:  Sitting up in bed. Says very sob with minimal exertion.  Feels LLE swelling decreasing    OBJECTIVE:  Temp:  [97.8 °F (36.6 °C)-98.1 °F (36.7 °C)] 97.9 °F (36.6 °C)  Pulse: the last 72 hours.     Recent Labs   Lab  12/13/17   1547   TROP  <0.046            Meds:     • Fluticasone Furoate-Vilanterol  1 puff Inhalation Daily   • Umeclidinium Bromide  1 puff Inhalation Daily   • ipratropium-albuterol  3 mL Nebulization QID   • Lo

## 2017-12-15 NOTE — PROGRESS NOTES
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

## 2017-12-15 NOTE — PROGRESS NOTES
[facility]  Progress Note    Akosua Whipple Patient Status:  Observation    10/3/1932 MRN [id number]   [facility] 3NE-A Attending Florence Huang, *   Hosp Day # 1 PCP None Pcp     Subjective:  Akosua Whipple is a(n) 80year old 9. 5   PLT  215.0  181.0  232.0         Lab Results  Component Value Date   PT 13.1 05/25/2011   INR 1.12 (H) 10/29/2017   INR 1.03 07/24/2017   INR 0.98 02/09/2017       Radiology:  No new imaging      Medications reviewed     ASSESSMENT     · Dyspnea-acut

## 2017-12-16 VITALS
OXYGEN SATURATION: 96 % | BODY MASS INDEX: 24.42 KG/M2 | HEART RATE: 102 BPM | RESPIRATION RATE: 20 BRPM | SYSTOLIC BLOOD PRESSURE: 128 MMHG | TEMPERATURE: 98 F | HEIGHT: 68 IN | WEIGHT: 161.13 LBS | DIASTOLIC BLOOD PRESSURE: 74 MMHG

## 2017-12-16 LAB — CREAT BLD-MCNC: 1.7 MG/DL (ref 0.7–1.3)

## 2017-12-16 PROCEDURE — 94640 AIRWAY INHALATION TREATMENT: CPT

## 2017-12-16 PROCEDURE — 82565 ASSAY OF CREATININE: CPT | Performed by: HOSPITALIST

## 2017-12-16 RX ORDER — FUROSEMIDE 40 MG/1
40 TABLET ORAL DAILY
Qty: 30 TABLET | Refills: 3 | Status: SHIPPED | OUTPATIENT
Start: 2017-12-16 | End: 2018-04-10

## 2017-12-16 RX ORDER — LOSARTAN POTASSIUM 25 MG/1
25 TABLET ORAL EVERY EVENING
Qty: 30 TABLET | Refills: 3 | Status: SHIPPED | OUTPATIENT
Start: 2017-12-16 | End: 2018-04-10

## 2017-12-16 NOTE — PLAN OF CARE
CARDIOVASCULAR - ADULT     • Maintains optimal cardiac output and hemodynamic stability Progressing     • Absence of cardiac arrhythmias or at baseline Progressing           HEMATOLOGIC - ADULT     • Maintains hematologic stability Progressing     • Free f

## 2017-12-16 NOTE — PLAN OF CARE
Assumed Patient care at 1900  No complaints of pain or discomfort  VSS  Increased -115 when patient ambulates   O2 Sats 98% on 3L-NC  Tele-V Paced  Daily weight  Contact Isolation maintained  Plan is for patient to D/C home with Israel Borges  Patient updated

## 2017-12-16 NOTE — PROGRESS NOTES
[facility]  Progress Note    Candace Cralton Patient Status:  Inpatient    10/3/1932 MRN [id number]   [facility] 3NE-A Attending Spenser Vincent, *   Hosp Day # 2 PCP None Pcp     Subjective:  Candace Carlton is a(n) 80year old m RDW  15.9  15.6  15.8   NEPRELIM  9.17*  8.67*  6.84*   WBC  10.5  10.6  9.5   PLT  215.0  181.0  232.0     Recent Labs   Lab  12/13/17   1547  12/14/17   0616  12/15/17   0606  12/16/17   0551   GLU  103*  130*  71   --    BUN  25*  29*  27*   --    CRE

## 2017-12-16 NOTE — PROGRESS NOTES
[facility] Cardiology  Progress Note    Darryle Jaye Patient Status:  Inpatient    10/3/1932 MRN [id number]   [facility] 3NE-A Attending [address] Day # 2 PCP None Pcp     Impression:  1.  Dyspnea on exerti no acute distress at rest  Cardiac: Regular rate and regular rhythm; no murmurs/rubs/gallops are appreciated  Lungs: Diminished bilaterally; no accessory muscle use  Abdomen: Soft, non-tender; bowel sounds are normoactive  Extremities: No clubbing/cyanosis

## 2017-12-16 NOTE — PROGRESS NOTES
[facility] Hospitalist Progress Note     PCP: None Pcp    CC:  Follow up    SUBJECTIVE:  Pt sitting up in bed. Feels breathing is better today. Had lasix IV 40 mg x1 overnight. This AM, able to walk to restroom without increasing NC to 6L.   On baseline NC. --    BUN  25*  29*  27*   --    CREATSERUM  1.68*  1.42*  1.61*  1.70*   CA  9.7  8.7  9.1   --    MG   --   2.5   --    --    GLU  103*  130*  71   --        Recent Labs   Lab  12/13/17   1547   ALT  37   AST  34   ALB  3.7       No results for input(s)

## 2017-12-17 NOTE — PLAN OF CARE
NURSING DISCHARGE NOTE    Discharged Home via Wheelchair. Accompanied by Support staff  Belongings Taken by patient/family.     Discharge instructions explained and given to patient  Patient took all belongings

## 2018-01-01 ENCOUNTER — LAB ENCOUNTER (OUTPATIENT)
Dept: LAB | Facility: HOSPITAL | Age: 83
End: 2018-01-01
Attending: INTERNAL MEDICINE
Payer: MEDICARE

## 2018-01-01 ENCOUNTER — APPOINTMENT (OUTPATIENT)
Dept: ULTRASOUND IMAGING | Facility: HOSPITAL | Age: 83
DRG: 178 | End: 2018-01-01
Attending: ORTHOPAEDIC SURGERY
Payer: MEDICARE

## 2018-01-01 ENCOUNTER — APPOINTMENT (OUTPATIENT)
Dept: GENERAL RADIOLOGY | Facility: HOSPITAL | Age: 83
DRG: 178 | End: 2018-01-01
Attending: ORTHOPAEDIC SURGERY
Payer: MEDICARE

## 2018-01-01 ENCOUNTER — HOSPITAL ENCOUNTER (OUTPATIENT)
Dept: CT IMAGING | Facility: HOSPITAL | Age: 83
Discharge: HOME | End: 2018-01-01
Attending: INTERNAL MEDICINE
Payer: MEDICARE

## 2018-01-01 ENCOUNTER — APPOINTMENT (OUTPATIENT)
Dept: CV DIAGNOSTICS | Facility: HOSPITAL | Age: 83
DRG: 178 | End: 2018-01-01
Attending: INTERNAL MEDICINE
Payer: MEDICARE

## 2018-01-01 ENCOUNTER — APPOINTMENT (OUTPATIENT)
Dept: GENERAL RADIOLOGY | Facility: HOSPITAL | Age: 83
DRG: 178 | End: 2018-01-01
Attending: HOSPITALIST
Payer: MEDICARE

## 2018-01-01 ENCOUNTER — LAB ENCOUNTER (OUTPATIENT)
Dept: LAB | Age: 83
End: 2018-01-01
Attending: INTERNAL MEDICINE
Payer: MEDICARE

## 2018-01-01 ENCOUNTER — APPOINTMENT (OUTPATIENT)
Dept: LAB | Age: 83
End: 2018-01-01
Attending: INTERNAL MEDICINE
Payer: MEDICARE

## 2018-01-01 ENCOUNTER — APPOINTMENT (OUTPATIENT)
Dept: GENERAL RADIOLOGY | Facility: HOSPITAL | Age: 83
DRG: 178 | End: 2018-01-01
Attending: INTERNAL MEDICINE
Payer: MEDICARE

## 2018-01-01 ENCOUNTER — APPOINTMENT (OUTPATIENT)
Dept: CT IMAGING | Facility: HOSPITAL | Age: 83
DRG: 178 | End: 2018-01-01
Attending: INTERNAL MEDICINE
Payer: MEDICARE

## 2018-01-01 ENCOUNTER — APPOINTMENT (OUTPATIENT)
Dept: ULTRASOUND IMAGING | Facility: HOSPITAL | Age: 83
DRG: 178 | End: 2018-01-01
Attending: INTERNAL MEDICINE
Payer: MEDICARE

## 2018-01-01 ENCOUNTER — HOSPITAL ENCOUNTER (INPATIENT)
Facility: HOSPITAL | Age: 83
LOS: 13 days | Discharge: HOME HEALTH | DRG: 178 | End: 2018-01-01
Attending: EMERGENCY MEDICINE | Admitting: INTERNAL MEDICINE
Payer: MEDICARE

## 2018-01-01 ENCOUNTER — APPOINTMENT (OUTPATIENT)
Dept: GENERAL RADIOLOGY | Facility: HOSPITAL | Age: 83
DRG: 178 | End: 2018-01-01
Attending: EMERGENCY MEDICINE
Payer: MEDICARE

## 2018-01-01 VITALS
HEIGHT: 68 IN | WEIGHT: 169.13 LBS | TEMPERATURE: 98 F | OXYGEN SATURATION: 98 % | HEART RATE: 83 BPM | DIASTOLIC BLOOD PRESSURE: 68 MMHG | RESPIRATION RATE: 18 BRPM | SYSTOLIC BLOOD PRESSURE: 108 MMHG | BODY MASS INDEX: 25.63 KG/M2

## 2018-01-01 DIAGNOSIS — R50.9 FEVER, UNSPECIFIED FEVER CAUSE: ICD-10-CM

## 2018-01-01 DIAGNOSIS — J40 BRONCHITIS: ICD-10-CM

## 2018-01-01 DIAGNOSIS — J44.9 OBSTRUCTIVE CHRONIC BRONCHITIS WITHOUT EXACERBATION (HCC): Primary | ICD-10-CM

## 2018-01-01 DIAGNOSIS — J18.9 COMMUNITY ACQUIRED PNEUMONIA, UNSPECIFIED LATERALITY: Primary | ICD-10-CM

## 2018-01-01 DIAGNOSIS — J44.9 COPD (CHRONIC OBSTRUCTIVE PULMONARY DISEASE) (HCC): ICD-10-CM

## 2018-01-01 DIAGNOSIS — E07.9 THYROID DISORDER: ICD-10-CM

## 2018-01-01 LAB
ALBUMIN SERPL-MCNC: 3.2 G/DL (ref 3.5–4.8)
ALBUMIN/GLOB SERPL: 1.1 {RATIO} (ref 1–2)
ALP LIVER SERPL-CCNC: 51 U/L (ref 45–117)
ALT SERPL-CCNC: 20 U/L (ref 17–63)
ANION GAP SERPL CALC-SCNC: 8 MMOL/L (ref 0–18)
AST SERPL-CCNC: 19 U/L (ref 15–41)
BASOPHILS # BLD AUTO: 0.05 X10(3) UL (ref 0–0.1)
BASOPHILS NFR BLD AUTO: 0.6 %
BILIRUB SERPL-MCNC: 0.8 MG/DL (ref 0.1–2)
BUN BLD-MCNC: 33 MG/DL (ref 8–20)
BUN/CREAT SERPL: 22.6 (ref 10–20)
CALCIUM BLD-MCNC: 8.7 MG/DL (ref 8.3–10.3)
CHLORIDE SERPL-SCNC: 108 MMOL/L (ref 101–111)
CO2 SERPL-SCNC: 27 MMOL/L (ref 22–32)
CREAT BLD-MCNC: 1.46 MG/DL (ref 0.7–1.3)
EOSINOPHIL # BLD AUTO: 0.15 X10(3) UL (ref 0–0.3)
EOSINOPHIL NFR BLD AUTO: 1.9 %
ERYTHROCYTE [DISTWIDTH] IN BLOOD BY AUTOMATED COUNT: 15.4 % (ref 11.5–16)
GLOBULIN PLAS-MCNC: 3 G/DL (ref 2.5–4)
GLUCOSE BLD-MCNC: 71 MG/DL (ref 70–99)
HCT VFR BLD AUTO: 41.4 % (ref 37–53)
HGB BLD-MCNC: 12.9 G/DL (ref 13–17)
IMMATURE GRANULOCYTE COUNT: 0.13 X10(3) UL (ref 0–1)
IMMATURE GRANULOCYTE RATIO %: 1.7 %
LYMPHOCYTES # BLD AUTO: 1.48 X10(3) UL (ref 0.9–4)
LYMPHOCYTES NFR BLD AUTO: 19.2 %
M PROTEIN MFR SERPL ELPH: 6.2 G/DL (ref 6.1–8.3)
MCH RBC QN AUTO: 30.4 PG (ref 27–33.2)
MCHC RBC AUTO-ENTMCNC: 31.2 G/DL (ref 31–37)
MCV RBC AUTO: 97.4 FL (ref 80–99)
MONOCYTES # BLD AUTO: 0.89 X10(3) UL (ref 0.1–1)
MONOCYTES NFR BLD AUTO: 11.5 %
NEUTROPHIL ABS PRELIM: 5.01 X10 (3) UL (ref 1.3–6.7)
NEUTROPHILS # BLD AUTO: 5.01 X10(3) UL (ref 1.3–6.7)
NEUTROPHILS NFR BLD AUTO: 65.1 %
OSMOLALITY SERPL CALC.SUM OF ELEC: 302 MOSM/KG (ref 275–295)
PLATELET # BLD AUTO: 207 10(3)UL (ref 150–450)
POTASSIUM SERPL-SCNC: 4.6 MMOL/L (ref 3.6–5.1)
RBC # BLD AUTO: 4.25 X10(6)UL (ref 3.8–5.8)
RED CELL DISTRIBUTION WIDTH-SD: 54.7 FL (ref 35.1–46.3)
SODIUM SERPL-SCNC: 143 MMOL/L (ref 136–144)
TSI SER-ACNC: 1.41 MIU/ML (ref 0.35–5.5)
WBC # BLD AUTO: 7.7 X10(3) UL (ref 4–13)

## 2018-01-01 PROCEDURE — 80048 BASIC METABOLIC PNL TOTAL CA: CPT | Performed by: INTERNAL MEDICINE

## 2018-01-01 PROCEDURE — 94667 MNPJ CHEST WALL 1ST: CPT

## 2018-01-01 PROCEDURE — 87070 CULTURE OTHR SPECIMN AEROBIC: CPT | Performed by: INTERNAL MEDICINE

## 2018-01-01 PROCEDURE — 97535 SELF CARE MNGMENT TRAINING: CPT

## 2018-01-01 PROCEDURE — 87070 CULTURE OTHR SPECIMN AEROBIC: CPT | Performed by: ORTHOPAEDIC SURGERY

## 2018-01-01 PROCEDURE — 85027 COMPLETE CBC AUTOMATED: CPT | Performed by: INTERNAL MEDICINE

## 2018-01-01 PROCEDURE — 94640 AIRWAY INHALATION TREATMENT: CPT

## 2018-01-01 PROCEDURE — 94664 DEMO&/EVAL PT USE INHALER: CPT

## 2018-01-01 PROCEDURE — 93005 ELECTROCARDIOGRAM TRACING: CPT

## 2018-01-01 PROCEDURE — 87077 CULTURE AEROBIC IDENTIFY: CPT | Performed by: INTERNAL MEDICINE

## 2018-01-01 PROCEDURE — 89050 BODY FLUID CELL COUNT: CPT | Performed by: ORTHOPAEDIC SURGERY

## 2018-01-01 PROCEDURE — 85730 THROMBOPLASTIN TIME PARTIAL: CPT | Performed by: INTERNAL MEDICINE

## 2018-01-01 PROCEDURE — 85007 BL SMEAR W/DIFF WBC COUNT: CPT | Performed by: INTERNAL MEDICINE

## 2018-01-01 PROCEDURE — 93306 TTE W/DOPPLER COMPLETE: CPT | Performed by: INTERNAL MEDICINE

## 2018-01-01 PROCEDURE — 71045 X-RAY EXAM CHEST 1 VIEW: CPT | Performed by: INTERNAL MEDICINE

## 2018-01-01 PROCEDURE — 85025 COMPLETE CBC W/AUTO DIFF WBC: CPT | Performed by: INTERNAL MEDICINE

## 2018-01-01 PROCEDURE — 73200 CT UPPER EXTREMITY W/O DYE: CPT | Performed by: INTERNAL MEDICINE

## 2018-01-01 PROCEDURE — 87999 UNLISTED MICROBIOLOGY PX: CPT

## 2018-01-01 PROCEDURE — 84100 ASSAY OF PHOSPHORUS: CPT | Performed by: INTERNAL MEDICINE

## 2018-01-01 PROCEDURE — 36415 COLL VENOUS BLD VENIPUNCTURE: CPT

## 2018-01-01 PROCEDURE — 20611 DRAIN/INJ JOINT/BURSA W/US: CPT | Performed by: ORTHOPAEDIC SURGERY

## 2018-01-01 PROCEDURE — 87075 CULTR BACTERIA EXCEPT BLOOD: CPT | Performed by: ORTHOPAEDIC SURGERY

## 2018-01-01 PROCEDURE — 94668 MNPJ CHEST WALL SBSQ: CPT

## 2018-01-01 PROCEDURE — 84443 ASSAY THYROID STIM HORMONE: CPT

## 2018-01-01 PROCEDURE — 97165 OT EVAL LOW COMPLEX 30 MIN: CPT

## 2018-01-01 PROCEDURE — 87186 SC STD MICRODIL/AGAR DIL: CPT | Performed by: INTERNAL MEDICINE

## 2018-01-01 PROCEDURE — 80053 COMPREHEN METABOLIC PANEL: CPT

## 2018-01-01 PROCEDURE — 71046 X-RAY EXAM CHEST 2 VIEWS: CPT | Performed by: EMERGENCY MEDICINE

## 2018-01-01 PROCEDURE — 99214 OFFICE O/P EST MOD 30 MIN: CPT

## 2018-01-01 PROCEDURE — 97168 OT RE-EVAL EST PLAN CARE: CPT

## 2018-01-01 PROCEDURE — 93970 EXTREMITY STUDY: CPT | Performed by: INTERNAL MEDICINE

## 2018-01-01 PROCEDURE — 87486 CHLMYD PNEUM DNA AMP PROBE: CPT | Performed by: EMERGENCY MEDICINE

## 2018-01-01 PROCEDURE — 85018 HEMOGLOBIN: CPT | Performed by: INTERNAL MEDICINE

## 2018-01-01 PROCEDURE — BP48ZZZ ULTRASONOGRAPHY OF RIGHT SHOULDER: ICD-10-PCS | Performed by: RADIOLOGY

## 2018-01-01 PROCEDURE — 85303 CLOT INHIBIT PROT C ACTIVITY: CPT | Performed by: INTERNAL MEDICINE

## 2018-01-01 PROCEDURE — 80053 COMPREHEN METABOLIC PANEL: CPT | Performed by: INTERNAL MEDICINE

## 2018-01-01 PROCEDURE — 36600 WITHDRAWAL OF ARTERIAL BLOOD: CPT | Performed by: EMERGENCY MEDICINE

## 2018-01-01 PROCEDURE — 93010 ELECTROCARDIOGRAM REPORT: CPT

## 2018-01-01 PROCEDURE — 85025 COMPLETE CBC W/AUTO DIFF WBC: CPT | Performed by: EMERGENCY MEDICINE

## 2018-01-01 PROCEDURE — 97116 GAIT TRAINING THERAPY: CPT

## 2018-01-01 PROCEDURE — 84295 ASSAY OF SERUM SODIUM: CPT | Performed by: EMERGENCY MEDICINE

## 2018-01-01 PROCEDURE — 83735 ASSAY OF MAGNESIUM: CPT | Performed by: INTERNAL MEDICINE

## 2018-01-01 PROCEDURE — 94669 MECHANICAL CHEST WALL OSCILL: CPT

## 2018-01-01 PROCEDURE — 81241 F5 GENE: CPT | Performed by: INTERNAL MEDICINE

## 2018-01-01 PROCEDURE — 87205 SMEAR GRAM STAIN: CPT | Performed by: INTERNAL MEDICINE

## 2018-01-01 PROCEDURE — 84145 PROCALCITONIN (PCT): CPT | Performed by: INTERNAL MEDICINE

## 2018-01-01 PROCEDURE — 87633 RESP VIRUS 12-25 TARGETS: CPT | Performed by: EMERGENCY MEDICINE

## 2018-01-01 PROCEDURE — 85610 PROTHROMBIN TIME: CPT | Performed by: EMERGENCY MEDICINE

## 2018-01-01 PROCEDURE — 97110 THERAPEUTIC EXERCISES: CPT

## 2018-01-01 PROCEDURE — 87798 DETECT AGENT NOS DNA AMP: CPT | Performed by: EMERGENCY MEDICINE

## 2018-01-01 PROCEDURE — 85025 COMPLETE CBC W/AUTO DIFF WBC: CPT

## 2018-01-01 PROCEDURE — 84132 ASSAY OF SERUM POTASSIUM: CPT | Performed by: EMERGENCY MEDICINE

## 2018-01-01 PROCEDURE — 71250 CT THORAX DX C-: CPT | Performed by: INTERNAL MEDICINE

## 2018-01-01 PROCEDURE — 99285 EMERGENCY DEPT VISIT HI MDM: CPT

## 2018-01-01 PROCEDURE — 96365 THER/PROPH/DIAG IV INF INIT: CPT

## 2018-01-01 PROCEDURE — 81003 URINALYSIS AUTO W/O SCOPE: CPT | Performed by: EMERGENCY MEDICINE

## 2018-01-01 PROCEDURE — 83050 HGB METHEMOGLOBIN QUAN: CPT | Performed by: EMERGENCY MEDICINE

## 2018-01-01 PROCEDURE — 82375 ASSAY CARBOXYHB QUANT: CPT | Performed by: EMERGENCY MEDICINE

## 2018-01-01 PROCEDURE — 96361 HYDRATE IV INFUSION ADD-ON: CPT

## 2018-01-01 PROCEDURE — 71045 X-RAY EXAM CHEST 1 VIEW: CPT | Performed by: HOSPITALIST

## 2018-01-01 PROCEDURE — 96375 TX/PRO/DX INJ NEW DRUG ADDON: CPT

## 2018-01-01 PROCEDURE — 87040 BLOOD CULTURE FOR BACTERIA: CPT | Performed by: EMERGENCY MEDICINE

## 2018-01-01 PROCEDURE — 83605 ASSAY OF LACTIC ACID: CPT | Performed by: EMERGENCY MEDICINE

## 2018-01-01 PROCEDURE — 85610 PROTHROMBIN TIME: CPT | Performed by: INTERNAL MEDICINE

## 2018-01-01 PROCEDURE — 89051 BODY FLUID CELL COUNT: CPT | Performed by: ORTHOPAEDIC SURGERY

## 2018-01-01 PROCEDURE — 80053 COMPREHEN METABOLIC PANEL: CPT | Performed by: EMERGENCY MEDICINE

## 2018-01-01 PROCEDURE — 82330 ASSAY OF CALCIUM: CPT | Performed by: EMERGENCY MEDICINE

## 2018-01-01 PROCEDURE — 81240 F2 GENE: CPT | Performed by: INTERNAL MEDICINE

## 2018-01-01 PROCEDURE — 87581 M.PNEUMON DNA AMP PROBE: CPT | Performed by: EMERGENCY MEDICINE

## 2018-01-01 PROCEDURE — 87205 SMEAR GRAM STAIN: CPT | Performed by: ORTHOPAEDIC SURGERY

## 2018-01-01 PROCEDURE — 84132 ASSAY OF SERUM POTASSIUM: CPT | Performed by: INTERNAL MEDICINE

## 2018-01-01 PROCEDURE — 85306 CLOT INHIBIT PROT S FREE: CPT | Performed by: INTERNAL MEDICINE

## 2018-01-01 PROCEDURE — 82803 BLOOD GASES ANY COMBINATION: CPT | Performed by: EMERGENCY MEDICINE

## 2018-01-01 PROCEDURE — 83880 ASSAY OF NATRIURETIC PEPTIDE: CPT | Performed by: INTERNAL MEDICINE

## 2018-01-01 PROCEDURE — 84132 ASSAY OF SERUM POTASSIUM: CPT | Performed by: HOSPITALIST

## 2018-01-01 PROCEDURE — 89060 EXAM SYNOVIAL FLUID CRYSTALS: CPT | Performed by: ORTHOPAEDIC SURGERY

## 2018-01-01 PROCEDURE — 85018 HEMOGLOBIN: CPT | Performed by: EMERGENCY MEDICINE

## 2018-01-01 PROCEDURE — 97161 PT EVAL LOW COMPLEX 20 MIN: CPT

## 2018-01-01 PROCEDURE — 0R9J3ZZ DRAINAGE OF RIGHT SHOULDER JOINT, PERCUTANEOUS APPROACH: ICD-10-PCS | Performed by: RADIOLOGY

## 2018-01-01 PROCEDURE — 85730 THROMBOPLASTIN TIME PARTIAL: CPT | Performed by: EMERGENCY MEDICINE

## 2018-01-01 PROCEDURE — 76377 3D RENDER W/INTRP POSTPROCES: CPT | Performed by: INTERNAL MEDICINE

## 2018-01-01 RX ORDER — FUROSEMIDE 40 MG/1
40 TABLET ORAL EVERY OTHER DAY
Status: DISCONTINUED | OUTPATIENT
Start: 2018-01-01 | End: 2018-01-01

## 2018-01-01 RX ORDER — POTASSIUM CHLORIDE 20 MEQ/1
40 TABLET, EXTENDED RELEASE ORAL ONCE
Status: COMPLETED | OUTPATIENT
Start: 2018-01-01 | End: 2018-01-01

## 2018-01-01 RX ORDER — ACETAMINOPHEN 325 MG/1
650 TABLET ORAL EVERY 6 HOURS PRN
Status: DISCONTINUED | OUTPATIENT
Start: 2018-01-01 | End: 2018-01-01

## 2018-01-01 RX ORDER — SODIUM CHLORIDE 9 MG/ML
62 INJECTION, SOLUTION INTRAVENOUS CONTINUOUS
Status: DISCONTINUED | OUTPATIENT
Start: 2018-01-01 | End: 2018-01-01

## 2018-01-01 RX ORDER — PREDNISONE 20 MG/1
40 TABLET ORAL
Status: DISCONTINUED | OUTPATIENT
Start: 2018-01-01 | End: 2018-01-01

## 2018-01-01 RX ORDER — METHYLPREDNISOLONE SODIUM SUCCINATE 125 MG/2ML
125 INJECTION, POWDER, LYOPHILIZED, FOR SOLUTION INTRAMUSCULAR; INTRAVENOUS ONCE
Status: COMPLETED | OUTPATIENT
Start: 2018-01-01 | End: 2018-01-01

## 2018-01-01 RX ORDER — SODIUM CHLORIDE 9 MG/ML
INJECTION, SOLUTION INTRAVENOUS CONTINUOUS
Status: ACTIVE | OUTPATIENT
Start: 2018-01-01 | End: 2018-01-01

## 2018-01-01 RX ORDER — SODIUM CHLORIDE 9 MG/ML
500 INJECTION, SOLUTION INTRAVENOUS CONTINUOUS
Status: ACTIVE | OUTPATIENT
Start: 2018-01-01 | End: 2018-01-01

## 2018-01-01 RX ORDER — HEPARIN SODIUM AND DEXTROSE 10000; 5 [USP'U]/100ML; G/100ML
INJECTION INTRAVENOUS CONTINUOUS
Status: DISCONTINUED | OUTPATIENT
Start: 2018-01-01 | End: 2018-01-01

## 2018-01-01 RX ORDER — ACETYLCYSTEINE 200 MG/ML
4 SOLUTION ORAL; RESPIRATORY (INHALATION) 3 TIMES DAILY
Status: DISCONTINUED | OUTPATIENT
Start: 2018-01-01 | End: 2018-01-01

## 2018-01-01 RX ORDER — PREDNISONE 20 MG/1
20 TABLET ORAL DAILY
Status: DISCONTINUED | OUTPATIENT
Start: 2018-01-01 | End: 2018-01-01

## 2018-01-01 RX ORDER — IBUPROFEN 400 MG/1
400 TABLET ORAL ONCE
Status: COMPLETED | OUTPATIENT
Start: 2018-01-01 | End: 2018-01-01

## 2018-01-01 RX ORDER — IPRATROPIUM BROMIDE AND ALBUTEROL SULFATE 2.5; .5 MG/3ML; MG/3ML
3 SOLUTION RESPIRATORY (INHALATION)
Status: DISCONTINUED | OUTPATIENT
Start: 2018-01-01 | End: 2018-01-01

## 2018-01-01 RX ORDER — SENNA AND DOCUSATE SODIUM 50; 8.6 MG/1; MG/1
2 TABLET, FILM COATED ORAL 2 TIMES DAILY PRN
Qty: 60 TABLET | Refills: 0 | Status: SHIPPED | OUTPATIENT
Start: 2018-01-01

## 2018-01-01 RX ORDER — PANTOPRAZOLE SODIUM 20 MG/1
20 TABLET, DELAYED RELEASE ORAL
Status: DISCONTINUED | OUTPATIENT
Start: 2018-01-01 | End: 2018-01-01

## 2018-01-01 RX ORDER — HEPARIN SODIUM AND DEXTROSE 10000; 5 [USP'U]/100ML; G/100ML
18 INJECTION INTRAVENOUS ONCE
Status: COMPLETED | OUTPATIENT
Start: 2018-01-01 | End: 2018-01-01

## 2018-01-01 RX ORDER — METHYLPREDNISOLONE SODIUM SUCCINATE 40 MG/ML
40 INJECTION, POWDER, LYOPHILIZED, FOR SOLUTION INTRAMUSCULAR; INTRAVENOUS EVERY 8 HOURS
Status: DISCONTINUED | OUTPATIENT
Start: 2018-01-01 | End: 2018-01-01

## 2018-01-01 RX ORDER — GUAIFENESIN 600 MG
600 TABLET, EXTENDED RELEASE 12 HR ORAL 2 TIMES DAILY
Qty: 60 TABLET | Refills: 0 | Status: SHIPPED | OUTPATIENT
Start: 2018-01-01

## 2018-01-01 RX ORDER — ONDANSETRON 2 MG/ML
4 INJECTION INTRAMUSCULAR; INTRAVENOUS EVERY 6 HOURS PRN
Status: DISCONTINUED | OUTPATIENT
Start: 2018-01-01 | End: 2018-01-01

## 2018-01-01 RX ORDER — HEPARIN SODIUM 5000 [USP'U]/ML
80 INJECTION INTRAVENOUS; SUBCUTANEOUS ONCE
Status: COMPLETED | OUTPATIENT
Start: 2018-01-01 | End: 2018-01-01

## 2018-01-01 RX ORDER — TRAMADOL HYDROCHLORIDE 50 MG/1
50 TABLET ORAL EVERY 6 HOURS PRN
Status: DISCONTINUED | OUTPATIENT
Start: 2018-01-01 | End: 2018-01-01

## 2018-01-01 RX ORDER — MORPHINE SULFATE 4 MG/ML
2 INJECTION, SOLUTION INTRAMUSCULAR; INTRAVENOUS
Status: DISCONTINUED | OUTPATIENT
Start: 2018-01-01 | End: 2018-01-01

## 2018-01-01 RX ORDER — SODIUM POLYSTYRENE SULFONATE 15 G/60ML
15 SUSPENSION ORAL; RECTAL ONCE
Status: COMPLETED | OUTPATIENT
Start: 2018-01-01 | End: 2018-01-01

## 2018-01-01 RX ORDER — ALBUTEROL SULFATE 90 UG/1
2 POWDER, METERED RESPIRATORY (INHALATION) EVERY 4 HOURS PRN
Refills: 12 | COMMUNITY
Start: 2018-01-01

## 2018-01-01 RX ORDER — ACETYLCYSTEINE 200 MG/ML
4 SOLUTION ORAL; RESPIRATORY (INHALATION) 2 TIMES DAILY
Status: DISCONTINUED | OUTPATIENT
Start: 2018-01-01 | End: 2018-01-01

## 2018-01-01 RX ORDER — GUAIFENESIN 600 MG
600 TABLET, EXTENDED RELEASE 12 HR ORAL 2 TIMES DAILY
Status: DISCONTINUED | OUTPATIENT
Start: 2018-01-01 | End: 2018-01-01

## 2018-01-01 RX ORDER — SODIUM CHLORIDE 9 MG/ML
500 INJECTION, SOLUTION INTRAVENOUS CONTINUOUS
Status: CANCELLED | OUTPATIENT
Start: 2018-01-01

## 2018-01-01 RX ORDER — LOSARTAN POTASSIUM 25 MG/1
25 TABLET ORAL DAILY
Status: DISCONTINUED | OUTPATIENT
Start: 2018-01-01 | End: 2018-01-01

## 2018-01-01 RX ORDER — BISACODYL 10 MG
10 SUPPOSITORY, RECTAL RECTAL
Status: DISCONTINUED | OUTPATIENT
Start: 2018-01-01 | End: 2018-01-01

## 2018-01-01 RX ORDER — FUROSEMIDE 10 MG/ML
20 INJECTION INTRAMUSCULAR; INTRAVENOUS ONCE
Status: COMPLETED | OUTPATIENT
Start: 2018-01-01 | End: 2018-01-01

## 2018-01-01 RX ORDER — PREDNISONE 10 MG/1
35 TABLET ORAL DAILY
Qty: 50 TABLET | Refills: 1 | Status: ON HOLD | OUTPATIENT
Start: 2018-01-01 | End: 2019-01-01

## 2018-01-01 RX ORDER — FUROSEMIDE 10 MG/ML
40 INJECTION INTRAMUSCULAR; INTRAVENOUS
Status: DISCONTINUED | OUTPATIENT
Start: 2018-01-01 | End: 2018-01-01

## 2018-01-01 RX ORDER — IPRATROPIUM BROMIDE AND ALBUTEROL SULFATE 2.5; .5 MG/3ML; MG/3ML
3 SOLUTION RESPIRATORY (INHALATION) ONCE
Status: COMPLETED | OUTPATIENT
Start: 2018-01-01 | End: 2018-01-01

## 2018-01-01 RX ORDER — ALBUTEROL SULFATE 90 UG/1
2 AEROSOL, METERED RESPIRATORY (INHALATION) EVERY 4 HOURS PRN
Status: DISCONTINUED | OUTPATIENT
Start: 2018-01-01 | End: 2018-01-01

## 2018-01-01 RX ORDER — MAGNESIUM HYDROXIDE/ALUMINUM HYDROXICE/SIMETHICONE 120; 1200; 1200 MG/30ML; MG/30ML; MG/30ML
30 SUSPENSION ORAL 4 TIMES DAILY PRN
Status: DISCONTINUED | OUTPATIENT
Start: 2018-01-01 | End: 2018-01-01

## 2018-01-01 RX ORDER — POLYVINYL ALCOHOL 14 MG/ML
1 SOLUTION/ DROPS OPHTHALMIC 4 TIMES DAILY PRN
Status: DISCONTINUED | OUTPATIENT
Start: 2018-01-01 | End: 2018-01-01

## 2018-01-01 RX ORDER — METHYLPREDNISOLONE SODIUM SUCCINATE 40 MG/ML
40 INJECTION, POWDER, LYOPHILIZED, FOR SOLUTION INTRAMUSCULAR; INTRAVENOUS EVERY 12 HOURS
Status: COMPLETED | OUTPATIENT
Start: 2018-01-01 | End: 2018-01-01

## 2018-01-01 RX ORDER — SENNA AND DOCUSATE SODIUM 50; 8.6 MG/1; MG/1
2 TABLET, FILM COATED ORAL 2 TIMES DAILY
Status: DISCONTINUED | OUTPATIENT
Start: 2018-01-01 | End: 2018-01-01

## 2018-01-01 RX ORDER — LEVOFLOXACIN 750 MG/1
750 TABLET ORAL EVERY OTHER DAY
Qty: 6 TABLET | Refills: 0 | Status: SHIPPED | OUTPATIENT
Start: 2018-01-01 | End: 2018-01-01 | Stop reason: ALTCHOICE

## 2018-01-01 RX ORDER — HEPARIN SODIUM 5000 [USP'U]/ML
5000 INJECTION, SOLUTION INTRAVENOUS; SUBCUTANEOUS EVERY 12 HOURS SCHEDULED
Status: DISCONTINUED | OUTPATIENT
Start: 2018-01-01 | End: 2018-01-01

## 2018-01-01 RX ORDER — METOCLOPRAMIDE HYDROCHLORIDE 5 MG/ML
5 INJECTION INTRAMUSCULAR; INTRAVENOUS EVERY 8 HOURS PRN
Status: DISCONTINUED | OUTPATIENT
Start: 2018-01-01 | End: 2018-01-01

## 2018-01-01 RX ORDER — IPRATROPIUM BROMIDE AND ALBUTEROL SULFATE 2.5; .5 MG/3ML; MG/3ML
3 SOLUTION RESPIRATORY (INHALATION) ONCE
Status: DISCONTINUED | OUTPATIENT
Start: 2018-01-01 | End: 2018-01-01 | Stop reason: ALTCHOICE

## 2018-01-01 RX ORDER — POLYETHYLENE GLYCOL 3350 17 G/17G
17 POWDER, FOR SOLUTION ORAL DAILY PRN
Status: DISCONTINUED | OUTPATIENT
Start: 2018-01-01 | End: 2018-01-01

## 2018-01-01 RX ORDER — MORPHINE SULFATE 4 MG/ML
8 INJECTION, SOLUTION INTRAMUSCULAR; INTRAVENOUS
Status: DISCONTINUED | OUTPATIENT
Start: 2018-01-01 | End: 2018-01-01

## 2018-01-01 RX ORDER — MORPHINE SULFATE 4 MG/ML
4 INJECTION, SOLUTION INTRAMUSCULAR; INTRAVENOUS
Status: DISCONTINUED | OUTPATIENT
Start: 2018-01-01 | End: 2018-01-01

## 2018-01-01 RX ORDER — ONDANSETRON 2 MG/ML
4 INJECTION INTRAMUSCULAR; INTRAVENOUS EVERY 4 HOURS PRN
Status: DISCONTINUED | OUTPATIENT
Start: 2018-01-01 | End: 2018-01-01

## 2018-01-01 RX ORDER — LEVOFLOXACIN 750 MG/1
750 TABLET ORAL EVERY OTHER DAY
Status: DISCONTINUED | OUTPATIENT
Start: 2018-01-01 | End: 2018-01-01

## 2018-01-23 ENCOUNTER — LAB ENCOUNTER (OUTPATIENT)
Dept: LAB | Age: 83
End: 2018-01-23
Attending: INTERNAL MEDICINE
Payer: MEDICARE

## 2018-01-23 DIAGNOSIS — J44.9 OBSTRUCTIVE CHRONIC BRONCHITIS WITHOUT EXACERBATION (HCC): Primary | ICD-10-CM

## 2018-01-23 LAB
BASOPHILS # BLD AUTO: 0.02 X10(3) UL (ref 0–0.1)
BASOPHILS NFR BLD AUTO: 0.2 %
BUN BLD-MCNC: 27 MG/DL (ref 8–20)
CALCIUM BLD-MCNC: 9.2 MG/DL (ref 8.3–10.3)
CHLORIDE: 105 MMOL/L (ref 101–111)
CO2: 27 MMOL/L (ref 22–32)
CREAT BLD-MCNC: 1.7 MG/DL (ref 0.7–1.3)
EOSINOPHIL # BLD AUTO: 0.01 X10(3) UL (ref 0–0.3)
EOSINOPHIL NFR BLD AUTO: 0.1 %
ERYTHROCYTE [DISTWIDTH] IN BLOOD BY AUTOMATED COUNT: 14.5 % (ref 11.5–16)
GLUCOSE BLD-MCNC: 161 MG/DL (ref 70–99)
HCT VFR BLD AUTO: 39.9 % (ref 37–53)
HGB BLD-MCNC: 12.7 G/DL (ref 13–17)
IMMATURE GRANULOCYTE COUNT: 0.06 X10(3) UL (ref 0–1)
IMMATURE GRANULOCYTE RATIO %: 0.6 %
LYMPHOCYTES # BLD AUTO: 0.46 X10(3) UL (ref 0.9–4)
LYMPHOCYTES NFR BLD AUTO: 4.8 %
MCH RBC QN AUTO: 31.3 PG (ref 27–33.2)
MCHC RBC AUTO-ENTMCNC: 31.8 G/DL (ref 31–37)
MCV RBC AUTO: 98.3 FL (ref 80–99)
MONOCYTES # BLD AUTO: 0.42 X10(3) UL (ref 0.1–0.6)
MONOCYTES NFR BLD AUTO: 4.4 %
NEUTROPHIL ABS PRELIM: 8.54 X10 (3) UL (ref 1.3–6.7)
NEUTROPHILS # BLD AUTO: 8.54 X10(3) UL (ref 1.3–6.7)
NEUTROPHILS NFR BLD AUTO: 89.9 %
PLATELET # BLD AUTO: 282 10(3)UL (ref 150–450)
POTASSIUM SERPL-SCNC: 4.5 MMOL/L (ref 3.6–5.1)
RBC # BLD AUTO: 4.06 X10(6)UL (ref 3.8–5.8)
RED CELL DISTRIBUTION WIDTH-SD: 52.1 FL (ref 35.1–46.3)
SODIUM SERPL-SCNC: 142 MMOL/L (ref 136–144)
WBC # BLD AUTO: 9.5 X10(3) UL (ref 4–13)

## 2018-01-23 PROCEDURE — 80048 BASIC METABOLIC PNL TOTAL CA: CPT

## 2018-01-23 PROCEDURE — 85025 COMPLETE CBC W/AUTO DIFF WBC: CPT

## 2018-03-01 ENCOUNTER — HOSPITAL ENCOUNTER (OUTPATIENT)
Dept: GENERAL RADIOLOGY | Facility: HOSPITAL | Age: 83
Discharge: HOME | End: 2018-03-01
Attending: INTERNAL MEDICINE
Payer: MEDICARE

## 2018-03-01 DIAGNOSIS — J15.9 BACTERIAL PNEUMONIA: ICD-10-CM

## 2018-03-01 PROCEDURE — 71046 X-RAY EXAM CHEST 2 VIEWS: CPT | Performed by: INTERNAL MEDICINE

## 2018-03-20 ENCOUNTER — HISTORICAL (OUTPATIENT)
Dept: ADMINISTRATIVE | Facility: HOSPITAL | Age: 83
End: 2018-03-20

## 2018-03-20 LAB
ABS NEUT (OLG): 6.72 X10(3)/MCL (ref 2.1–9.2)
ALBUMIN SERPL-MCNC: 4 GM/DL (ref 3.4–5)
ALBUMIN/GLOB SERPL: 1.4 {RATIO}
ALP SERPL-CCNC: 109 UNIT/L (ref 50–136)
ALT SERPL-CCNC: 21 UNIT/L (ref 12–78)
APPEARANCE, UA: CLEAR
AST SERPL-CCNC: 20 UNIT/L (ref 15–37)
BACTERIA SPEC CULT: NORMAL /HPF
BASOPHILS # BLD AUTO: 0.1 X10(3)/MCL (ref 0–0.2)
BASOPHILS NFR BLD AUTO: 1 %
BILIRUB SERPL-MCNC: 0.8 MG/DL (ref 0.2–1)
BILIRUB UR QL STRIP: NEGATIVE
BILIRUBIN DIRECT+TOT PNL SERPL-MCNC: 0.2 MG/DL (ref 0–0.2)
BILIRUBIN DIRECT+TOT PNL SERPL-MCNC: 0.6 MG/DL (ref 0–0.8)
BUN SERPL-MCNC: 22 MG/DL (ref 7–18)
CALCIUM SERPL-MCNC: 9.1 MG/DL (ref 8.5–10.1)
CHLORIDE SERPL-SCNC: 108 MMOL/L (ref 98–107)
CHOLEST SERPL-MCNC: 185 MG/DL (ref 0–200)
CHOLEST/HDLC SERPL: 3 {RATIO} (ref 0–5)
CO2 SERPL-SCNC: 28 MMOL/L (ref 21–32)
COLOR UR: YELLOW
CREAT SERPL-MCNC: 1.03 MG/DL (ref 0.7–1.3)
EOSINOPHIL # BLD AUTO: 0.2 X10(3)/MCL (ref 0–0.9)
EOSINOPHIL NFR BLD AUTO: 2 %
ERYTHROCYTE [DISTWIDTH] IN BLOOD BY AUTOMATED COUNT: 12.2 % (ref 11.5–17)
GLOBULIN SER-MCNC: 2.9 GM/DL (ref 2.4–3.5)
GLUCOSE (UA): NEGATIVE
GLUCOSE SERPL-MCNC: 100 MG/DL (ref 74–106)
HCT VFR BLD AUTO: 47.8 % (ref 42–52)
HDLC SERPL-MCNC: 61 MG/DL (ref 35–60)
HGB BLD-MCNC: 15.9 GM/DL (ref 14–18)
HGB UR QL STRIP: NEGATIVE
KETONES UR QL STRIP: NEGATIVE
LDLC SERPL CALC-MCNC: 109 MG/DL (ref 0–129)
LEUKOCYTE ESTERASE UR QL STRIP: NEGATIVE
LYMPHOCYTES # BLD AUTO: 2.4 X10(3)/MCL (ref 0.6–4.6)
LYMPHOCYTES NFR BLD AUTO: 22 %
MCH RBC QN AUTO: 31.4 PG (ref 27–31)
MCHC RBC AUTO-ENTMCNC: 33.3 GM/DL (ref 33–36)
MCV RBC AUTO: 94.3 FL (ref 80–94)
MONOCYTES # BLD AUTO: 1.2 X10(3)/MCL (ref 0.1–1.3)
MONOCYTES NFR BLD AUTO: 12 %
NEUTROPHILS # BLD AUTO: 6.72 X10(3)/MCL (ref 2.1–9.2)
NEUTROPHILS NFR BLD AUTO: 63 %
NITRITE UR QL STRIP: NEGATIVE
PH UR STRIP: 6 [PH] (ref 5–9)
PLATELET # BLD AUTO: 177 X10(3)/MCL (ref 130–400)
PMV BLD AUTO: 12.1 FL (ref 9.4–12.4)
POTASSIUM SERPL-SCNC: 4.4 MMOL/L (ref 3.5–5.1)
PROT SERPL-MCNC: 6.9 GM/DL (ref 6.4–8.2)
PROT UR QL STRIP: NEGATIVE
PSA SERPL-MCNC: 1.59 NG/ML (ref 0–4)
RBC # BLD AUTO: 5.07 X10(6)/MCL (ref 4.7–6.1)
RBC #/AREA URNS HPF: NORMAL /[HPF]
SODIUM SERPL-SCNC: 144 MMOL/L (ref 136–145)
SP GR UR STRIP: 1.02 (ref 1–1.03)
SQUAMOUS EPITHELIAL, UA: NORMAL
TRIGL SERPL-MCNC: 76 MG/DL (ref 30–150)
TSH SERPL-ACNC: 3.6 MIU/ML (ref 0.36–3.74)
UROBILINOGEN UR STRIP-ACNC: 0.2
VLDLC SERPL CALC-MCNC: 15 MG/DL
WBC # SPEC AUTO: 10.7 X10(3)/MCL (ref 4.5–11.5)
WBC #/AREA URNS HPF: NORMAL /[HPF]

## 2018-05-14 ENCOUNTER — HOSPITAL ENCOUNTER (OUTPATIENT)
Dept: CT IMAGING | Facility: HOSPITAL | Age: 83
Discharge: HOME | End: 2018-05-14
Attending: INTERNAL MEDICINE
Payer: MEDICARE

## 2018-05-14 DIAGNOSIS — R04.2 HEMOPTYSIS: ICD-10-CM

## 2018-05-14 DIAGNOSIS — J44.9 CHRONIC OBSTRUCTIVE PULMONARY DISEASE, UNSPECIFIED COPD TYPE (HCC): ICD-10-CM

## 2018-05-14 PROCEDURE — 71250 CT THORAX DX C-: CPT | Performed by: INTERNAL MEDICINE

## 2018-06-26 ENCOUNTER — LAB ENCOUNTER (OUTPATIENT)
Dept: LAB | Age: 83
End: 2018-06-26
Attending: INTERNAL MEDICINE
Payer: MEDICARE

## 2018-06-26 DIAGNOSIS — J44.9 COPD (CHRONIC OBSTRUCTIVE PULMONARY DISEASE) (HCC): Primary | ICD-10-CM

## 2018-06-26 PROCEDURE — 36415 COLL VENOUS BLD VENIPUNCTURE: CPT

## 2018-06-26 PROCEDURE — 85025 COMPLETE CBC W/AUTO DIFF WBC: CPT

## 2018-06-26 PROCEDURE — 80053 COMPREHEN METABOLIC PANEL: CPT

## 2018-11-05 PROBLEM — E87.3 METABOLIC ALKALOSIS: Status: ACTIVE | Noted: 2018-01-01

## 2018-11-05 PROBLEM — J40 BRONCHITIS: Status: ACTIVE | Noted: 2018-01-01

## 2018-11-05 PROBLEM — R50.9 FEVER, UNSPECIFIED FEVER CAUSE: Status: ACTIVE | Noted: 2018-01-01

## 2018-11-05 PROBLEM — D72.829 LEUKOCYTOSIS: Status: ACTIVE | Noted: 2018-01-01

## 2018-11-05 PROBLEM — N17.9 ACUTE KIDNEY INJURY (HCC): Status: ACTIVE | Noted: 2018-01-01

## 2018-11-05 PROBLEM — J18.9 COMMUNITY ACQUIRED PNEUMONIA, UNSPECIFIED LATERALITY: Status: ACTIVE | Noted: 2018-01-01

## 2018-11-05 PROBLEM — J18.9 COMMUNITY ACQUIRED PNEUMONIA: Status: ACTIVE | Noted: 2018-01-01

## 2018-11-06 NOTE — PLAN OF CARE
RESPIRATORY - ADULT    • Achieves optimal ventilation and oxygenation Progressing            NURSING ADMISSION NOTE      Patient admitted via cart. Oriented to room. Safety precautions initiated. Bed in low position. Call light in reach.     Tele-V pace

## 2018-11-06 NOTE — PROGRESS NOTES
Pharmacy Note: Renal dose adjustment for Metoclopramide (Reglan)  Giorgio Emmanuel has been prescribed Metoclopramide (Reglan) 10 mg every 8 hours as needed. Estimated Creatinine Clearance: 25.9 mL/min (A) (based on SCr of 1.98 mg/dL (H)).     His calculat

## 2018-11-06 NOTE — PROGRESS NOTES
11/06/18 1448   Clinical Encounter Type   Visited With Patient   Patient's Supportive Strategies/Resources Patient finds community support within the [employer] and [employer]. Referral To Nurse;Physician  (Patient signed POLST form/DNR.   Physician:  Please si

## 2018-11-06 NOTE — ED INITIAL ASSESSMENT (HPI)
Patient presents with increased APRIL since yesterday. He is on home o2, 6L with activity, 3L at rest. Productive cough with yellow sputum and a temp of 101 this evening.

## 2018-11-06 NOTE — PLAN OF CARE
AxO x4, anxious at times. Huslia, bilateral hearing aids at bedside. On 3L, baseline 3L at rest, 6L with exertion. Dyspnea on exertion. Productive cough, thick yellow with some pink-tinge. Nebs scheduled, IV abx. HOB >30. US BLE today, negative for DVT.  SQ He to discharge: pneumonia    Anticipated discharge date: TBD    Current discharge plan: Home with [facility], currently lives with daughter    Back up discharge plan: NA

## 2018-11-06 NOTE — ED PROVIDER NOTES
Patient Seen in: [facility] Emergency Department    History   Patient presents with:  Cough/URI  Dyspnea MALCOLM SOB (respiratory)  Fever (infectious)    Stated Complaint: cough/malcolm/fever    HPI    The patient states his symptoms started yesterday.   The PACEMAKER  05/25/2011    [employer] Pacemaker   • REPAIR ROTATOR CUFF,ACUTE      left           Social History    Tobacco Use      Smoking status: Former Smoker        Packs/day: 1.00        Years: 40.00        Pack years: 40        Types: Cigarettes        Q Osmolality 296 (*)     GFR, Non- 30 (*)     GFR, -American 34 (*)     All other components within normal limits   ABG PANEL W ELECT AND LACTATE - Abnormal; Notable for the following components:    ABG pH 7.46 (*)     ABG pCO2 31 (*) white count was elevated 15.8, lactic acid was noted comprehensive shows renal insufficiency. His creatinine normally runs about 1.8 is now gone up to 1.98, BUN was 36, ABG shows a PCO2 of 31. P pH is 7.46 is no findings of CO2 retention.   The patient

## 2018-11-06 NOTE — CONSULTS
[facility]  Report of Consultation    Efrain Half Patient Status:  Inpatient    10/3/1932 MRN [id number]   [facility] 5NW-A Attending Silvia Souza MD   Hosp Day # 1 PCP None Pcp     Reason for Consultation: Pneumonia on a Pacemaker   • [address]      left     Family History   Problem Relation Age of Onset   • Stroke Father    • Cancer Mother         Cancer - stomach   • Eye Problems Brother         Eye disorder   • Other (Other[other]) Brother         Isabelle (MULTI-VITAMIN/MINERALS) Oral Tab Take 1 tablet by mouth daily. Disp:  Rfl:  11/5/2018 at Unknown time   Misc Natural Products (GLUCOS-CHONDROIT-MSM COMPLEX OR) Take 1 tablet by mouth daily.    Disp:  Rfl:  11/5/2018 at Unknown time   triamcinolone acetonid 24.91 kg/m²     Intake/Output:    Intake/Output Summary (Last 24 hours) at 11/6/2018 0831  Last data filed at 11/6/2018 0816  Gross per 24 hour   Intake 2345 ml   Output 100 ml   Net 2245 ml       Physical Exam:   General: alert, cooperative, oriented.   No pending    Radiology: Chest x-ray referenced above    Assessment and Plan:    1.  Picture most consistent with pneumonia although febrile bronchitis is also in the differential.  To my eyes there are chest x-ray changes consistent with a lower respiratory t

## 2018-11-06 NOTE — H&P
[facility] Hospitalist H&P       CC: Patient presents with:  Cough/URI  Dyspnea APRIL SOB (respiratory)  Fever (infectious)       PCP: None Pcp    History of Present Illness:  Mr. Alix Gallagher is an 79 yo male with PMH of COPD, complete heart block s/p PPM, HTN, prior D • HC SPLY LEAD PACEMAKER NON TRANSVENOUS SINGLE  2016    new RV lead placed due to high impedance-Dr. Jacques Delgado   • PACEMAKER  05/25/2011    [employer] Pacemaker   • REPAIR ROTATOR CUFF,ACUTE      left        ALL:    Dye [Radiology Cont*        Comment:Marcel Alcohol use: Yes      Comment: 3 drinks per week       Fam Hx  Family History   Problem Relation Age of Onset   • Stroke Father    • Cancer Mother         Cancer - stomach   • Eye Problems Brother         Eye disorder   • Other (Other[other]) Brother 11/01/18 1944 11/05/18 2027   ALT  27  22   AST  20  16   ALB  3.7  3.5       No results for input(s): TROP in the last 168 hours.     Additional Diagnostics: ECG: v-paced, tachycardic to 111    CXR: image personally reviewed increased infiltrate bilat subQ  Deconditioning prevention: PT/OT    Dispo: admitted with SOB, concern for PNA    Outpatient records reviewed confirming patient's medical history and medications.      Josefa Browning  Internal Medicine  [facility] Hospitalist  Pager: [phone]      **Cert

## 2018-11-07 NOTE — OCCUPATIONAL THERAPY NOTE
OCCUPATIONAL THERAPY QUICK EVALUATION - INPATIENT    Room Number: 154/213-S  Evaluation Date: 11/7/2018     Type of Evaluation: Initial  Presenting Problem: PNA    Physician Order: IP Consult to Occupational Therapy  Reason for Therapy:  ADL/IADL Dysfuncti OCCUPATIONAL PROFILE    HOME SITUATION  Type of Home: House  Home Layout: One level  Lives With: [facility] and Equipment: Standard height toilet  Shower/Tub and Equipment: Walk-in shower       Occupation/Status: retired  Hand Dominance: Right received supine in bed for session, therapist completed MMT and ROM on pt, pt was able to amb x1 in room and hallway and bathroom with supervision, therapist assist pt to complete dressing with supervision and toileting with supervision.     Patient End of

## 2018-11-07 NOTE — PLAN OF CARE
AxO x4. On 3L at rest, 6L with exertion, baseline level. Bilateral expiratory wheezes, nebs qid, IV steroids. Productive cough with thick yellow sputum, some red streaking seen but stable. STEWART. On tele, V-paced.  Bilateral ankle non-pitting edema, IVF infus NA

## 2018-11-07 NOTE — PHYSICAL THERAPY NOTE
PHYSICAL THERAPY QUICK EVALUATION - INPATIENT    Room Number: 651/002-Y  Evaluation Date: 11/7/2018  Presenting Problem: PNA  Physician Order: PT Eval and Treat    Pt was admitted from home on 11/5/2018 with PNA.  Pt has a past medical history significant f Enter : 11((5 followed by landing followed by 6 more))  Railing: Yes  Stairs to Bedroom: 0       Lives With: Family     Patient Owned Equipment: Other (Comment)(rollator)  Patient Regularly Uses: Home O2(3L at rest, 6L with activity)    Prior Level of Inde Score:  Raw Score: 23   PT Approx Degree of Impairment Score: 11.2%   Standardized Score (AM-PAC Scale): 56.93   CMS Modifier (G-Code): CI      FUNCTIONAL ABILITY STATUS  Gait Assessment  Gait Assistance: Supervision(for line management, O2 tank)  Distance

## 2018-11-07 NOTE — PROGRESS NOTES
[facility]  Progress Note    Chance Nur Patient Status:  Inpatient    10/3/1932 MRN [id number]   [facility] 5NW-A Attending Nellie Brady MD   Hosp Day # 2 PCP None Pcp     Assessment and Plan:     1.  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, left     Acute deep vein thrombosis (DVT) of popliteal vein of left lower extremity ([address]     Leukocytosis     Acute kidney injury ([address]     Community acquired p 123*  185*  149*   BUN  32*  36*  44*  42*   CREATSERUM  1.84*  1.98*  2.07*  1.57*   GFRAA  38*  34*  33*  45*   GFRNAA  32*  30*  28*  39*   CA  9.5  9.2  7.9*  7.8*   ALB  3.7  3.5   --    --    NA  140  138  141  143   K  4.9  4.2  4.5  4.7   CL  106 mg 650 mg Oral Q6H PRN   ondansetron HCl (ZOFRAN) injection 4 mg 4 mg Intravenous Q6H PRN   Metoclopramide HCl (REGLAN) injection 5 mg 5 mg Intravenous Q8H PRN       PEAK flow  PF Readings from Last 1 Encounters:  No data found for PF        Omayra Borja

## 2018-11-07 NOTE — PLAN OF CARE
Patient/Family Goals    • Patient/Family Long Term Goal Progressing    • Patient/Family Short Term Goal Progressing        RESPIRATORY - ADULT    • Achieves optimal ventilation and oxygenation Progressing          Pt. Alert x4.  Hard of hearing, bilateral h

## 2018-11-07 NOTE — PROGRESS NOTES
[facility] hospitalist daily Note    Patient was seen/examined on 11/7/18    S: still has cough productive of yellow sputum.  Had a little blood tinge to the sputum  No chest pain, no SOB at rest    Medications in Epic    Pe    11/07/18  1233   BP: 130/71   Pulse:

## 2018-11-08 NOTE — CM/SW NOTE
Met with pt to discuss discharge planning. Pt had been living with his son in [place], Adithya Leavitt but more recently has been living with his dtr, Dr Bart Melgoza. Pt stated his son-in-law is retired and with him during the day.   Pt uses home O2 provided by [facility]

## 2018-11-08 NOTE — HOME CARE LIAISON
Referral received from [facility]. Met with patient and family to offer home health when discharged home. Patient is staying with daughter in [facility], but patient undecided if he wants home health at this time.   Patient is currently receiving [facility]

## 2018-11-08 NOTE — PROGRESS NOTES
[facility] hospitalist daily Note     Patient was seen/examined on 11/8/18     S: still has cough productive of thick yellow sputum. Had a little blood tinge to the sputum  No chest pain, no SOB at rest but feels SOB with excertion.  He is chronically on home O2

## 2018-11-08 NOTE — PLAN OF CARE
AxO x4. Received on 3L, baseline level, spO2 100%, orders per pulm to titrate to maintain 90-94%. Pt became very anxious when oxygen was turned down, successfully weaned to 2L with spO2 98%, pt refusing further weaning at this time.  IV steroids, nebs, IS, requiring resolution prior to discharge: pna    Anticipated discharge date: TBD    Current discharge plan: Home with dtr    Back up discharge plan: NA

## 2018-11-08 NOTE — PROGRESS NOTES
[facility]  Progress Note    August Dillon Patient Status:  Inpatient    10/3/1932 MRN [id number]   [facility] 5NW-A Attending Saint Hausen, MD   [facility] Day # 3 PCP None Pcp     Subjective:  August Dillon is a(n) 80year old male ion blood cultures thus far    Radiology:  Chest x-ray today with questionable slight increase right effusion airspace disease seems about the same    Negative venous Dopplers      Medications reviewed     Assessment and Plan:   Patient Active Problem List: prophylaxis   continue current level of antibiotic therapy    Add vest therapy as trial  Increase activity as able    CC     Christy Rodriguez MD  11/8/2018  11:35 AM

## 2018-11-09 NOTE — CONSULTS
[facility] Cardiology  Consultation Note      Giorgio Emmanuel Patient Status:  Inpatient    10/3/1932 MRN [id number]   [facility] 5NW-A Attending Marin Kebede MD   Hosp Day # 4 PCP None Pcp     Reason for consultation:  Dyspnea; MBP/ADD-vantage 1 g Intravenous Q24H   azithromycin (ZITHROMAX) 500 mg in sodium chloride 0.9 % 250 mL IVPB 500 mg Intravenous Q24H   furosemide (LASIX) tab 40 mg 40 mg Oral QOD   ipratropium-albuterol (DUONEB) nebulizer solution 3 mL 3 mL Nebulization QID that he quit smoking about 28 years ago. His smoking use included cigarettes. He has a 40.00 pack-year smoking history. he has never used smokeless tobacco. He reports that he drinks alcohol. He reports that he does not use drugs.      Allergies    Adam Aburto Component Value Date    PT 13.1 05/25/2011    INR 0.99 11/05/2018    INR 1.12 (H) 10/29/2017        Lab Results   Component Value Date    WBC 10.1 11/09/2018    HGB 10.9 11/09/2018    HCT 33.0 11/09/2018    .0 11/09/2018    CREATSERUM 1.48 11/09/2

## 2018-11-09 NOTE — PROGRESS NOTES
[facility] hospitalist daily Note     Patient was seen/examined on 11/9/18     S: still has cough productive of thick yellow sputum.  No longer blood tinge to the sputum  No chest pain, no SOB at rest but feels SOB with excertion.      Medications in Epic     PE

## 2018-11-09 NOTE — PROGRESS NOTES
[facility]  Progress Note    Valeria Cuellar Patient Status:  Inpatient    10/3/1932 MRN [id number]   [facility] 5NW-A Attending Ry Monroe MD   [facility] Day # 4 PCP None Pcp     Subjective:  Valeria Cuellar is a(n) 80year old male.  No NA  143  146*  144   K  4.7  4.6  4.7   CL  114*  114*  113*   CO2  21.0*  23.0  23.0     Lab Results   Component Value Date    PT 13.1 05/25/2011    INR 0.99 11/05/2018    INR 1.12 (H) 10/29/2017    INR 1.03 07/24/2017     Cultures: Sputum thus far norm

## 2018-11-09 NOTE — PROGRESS NOTES
Pt is a 81 y/o male admitted with weakness and pna.alert oriented. on 3-4L02 via NC.02 sats 90-95%. denies SOB pain,fever,N/V.up as tolerated. on iv steroids,zithromax and Rocephin.encouraged IS and ambulation. no s/s of aspiration noted. aspiration and fall pr

## 2018-11-09 NOTE — PLAN OF CARE
RESPIRATORY - ADULT    • Achieves optimal ventilation and oxygenation Progressing          Patient A+Ox4. C/o mild shoulder pain but declines need for any pain medications. Up with standby assist. SOB upon exertion. On 3L-O2 via NC ans sats in high 90's.  I

## 2018-11-09 NOTE — PLAN OF CARE
Problem: Patient/Family Goals  Goal: Patient/Family Short Term Goal  Patient's Short Term Goal: To breathe better  11/6: breathe better  11/7: decrease edema in legs  11/7 AM: walk more with less SOB  11/7 noc:increase activity as tolerated  11/8 AM: breat

## 2018-11-10 NOTE — PLAN OF CARE
Patient/Family Goals    • Patient/Family Long Term Goal Progressing    • Patient/Family Short Term Goal Progressing        RESPIRATORY - ADULT    • Achieves optimal ventilation and oxygenation Progressing          Problem: PNA    Data: Pt alert and Rama

## 2018-11-10 NOTE — PROGRESS NOTES
[facility] Hospitalist Progress Note     [place] Point Patient Status:  Inpatient    10/3/1932 MRN [id number]   [facility] 5NW-A Attending Jaya Roman MD   Hosp Day # 5 PCP None Pcp     CC: follow up    SUBJECTIVE:  Seen after taking a walk Pantoprazole Sodium  20 mg Oral QAM AC   • Umeclidinium Bromide  1 puff Inhalation Daily   • Losartan Potassium  25 mg Oral Daily   • MethylPREDNISolone Sodium Succ  40 mg Intravenous Q8H   • cefTRIAXone  1 g Intravenous Q24H   • azithromycin  500 mg Intra

## 2018-11-10 NOTE — PROGRESS NOTES
[facility] [address] [place] [address] Cardiology  Progress Note    Norma Jett Patient Status:  Inpatient    10/3/1932 MRN [id number]   [facility] 5NW-A Attending Benjamin Borrero MD   Hosp Day # 5 PCP None Pcp     Impression:  1.  Suspected RLL PNA/LRTI  2 tab 20 mg 20 mg Oral QAM AC   Umeclidinium Bromide (INCRUSE ELLIPTA) 62.5 MCG/INH inhaler 1 puff 1 puff Inhalation Daily   Losartan Potassium (COZAAR) tab 25 mg 25 mg Oral Daily   MethylPREDNISolone Sodium Succ (Solu-MEDROL) injection 40 mg 40 mg Intraveno

## 2018-11-10 NOTE — PROGRESS NOTES
[facility]  Progress Note    Selam Graves Patient Status:  Inpatient    10/3/1932 MRN [id number]   [facility] 5NW-A Attending Keila Hooks MD   [facility] Day # 5 PCP None Pcp     Subjective:  Selam Graves is a(n) 80year old male.  No CO2  21.0*  23.0  23.0     Lab Results   Component Value Date    PT 13.1 05/25/2011    INR 0.99 11/05/2018    INR 1.12 (H) 10/29/2017    INR 1.03 07/24/2017     Cultures: Sputum thus far normal respiratory erick with negative panel and negative blood cul

## 2018-11-11 NOTE — PROGRESS NOTES
[facility]  Progress Note    Amarjit Sanchez Patient Status:  Inpatient    10/3/1932 MRN [id number]   [facility] 5NW-A Attending Alix Candelario MD   [facility] Day # 6 PCP None Pcp     Subjective:  Amarjit Sanchez is a(n) 80year old male ion Plan:   Patient Active Problem List:     COPD (chronic obstructive pulmonary disease) ([facility] [address]     AV block, 2nd degree     Pacemaker [facility]-DEP     CKD (chronic kidney disease) stage 3, GFR 30-59 ml/min ([facility])     Hypertension     Pneumonia     Notalgia pare

## 2018-11-11 NOTE — CONSULTS
[facility]  Report of Consultation    Stormy Gracia Patient Status:  Inpatient    10/3/1932 MRN [id number]   [facility] 5NW-A Attending Fabiola Ray MD   Hosp Day # 6 PCP None Pcp     Reason for Consultation:  DVT    History of Pres mild   • Sinus problem    • Unspecified essential hypertension    • Visual impairment     contacts     Past Surgical History:   Procedure Laterality Date   • ANGIOGRAM  11/17/2017    normal coronaries, normal right heart pressures   • CATARACT     • CAT (Solu-MEDROL) injection 40 mg, 40 mg, Intravenous, Q8H  •  CefTRIAXone Sodium (ROCEPHIN) 1 g in sodium chloride 0.9 % 100 mL MBP/ADD-vantage, 1 g, Intravenous, Q24H  •  azithromycin (ZITHROMAX) 500 mg in sodium chloride 0.9 % 250 mL IVPB, 500 mg, Intraveno right-sided DVT, with occlusive clot in the midportion of the right brachials vein. There is also occlusive clot in the right proximal basilic vein. Impression and Plan:      1.  DVT - RUE  Suspect this is related to previous venipuncture or IV sites al

## 2018-11-11 NOTE — PROGRESS NOTES
[facility] Hospitalist Progress Note     Giorgio Emmanuel Patient Status:  Inpatient    10/3/1932 MRN [id number]   [facility] 5NW-A Attending Marin Kebede MD   Hosp Day # 6 PCP None Pcp     CC: follow up    SUBJECTIVE:  Breathing stable  Still l Pantoprazole Sodium  20 mg Oral QAM AC   • Umeclidinium Bromide  1 puff Inhalation Daily   • Losartan Potassium  25 mg Oral Daily   • MethylPREDNISolone Sodium Succ  40 mg Intravenous Q8H   • cefTRIAXone  1 g Intravenous Q24H   • azithromycin  500 mg Intra cardiac  DNR    Dispo: remain inpatient        Aamir Hale MD     [facility] IM Hospitalist  Pager: [phone]         Addendum:   Doppler results as below:      CONCLUSION:  Positive for right-sided DVT, with occlusive clot in the midportion of the right

## 2018-11-11 NOTE — PROGRESS NOTES
[facility] [address] [place] [address] Cardiology  Progress Note    Thor Free Patient Status:  Inpatient    10/3/1932 MRN [id number]   [facility] 5NW-A Attending Reyna Swann MD   Hosp Day # 6 PCP None Pcp     Impression:  1.  Suspected RLL PNA/LRTI  2 200-25 MCG/INH inhaler 1 puff 1 puff Inhalation Daily   Pantoprazole Sodium (PROTONIX) EC tab 20 mg 20 mg Oral QAM AC   Umeclidinium Bromide (INCRUSE ELLIPTA) 62.5 MCG/INH inhaler 1 puff 1 puff Inhalation Daily   Losartan Potassium (COZAAR) tab 25 mg 25 mg

## 2018-11-12 NOTE — PROGRESS NOTES
[facility]  Progress Note    Thor Free Patient Status:  Inpatient    10/3/1932 MRN [id number]   [facility] 5NW-A Attending Ayesha Vaca MD   Hosp Day # 7 PCP None Pcp        Assessment and Plan:     1.  Suspected right lowe left     Acute deep vein thrombosis (DVT) of popliteal vein of left lower extremity (HCC)     Leukocytosis     Acute kidney injury ([facility] [address]     Community acquired pneumonia     Metabolic alkalosis     Community acquired pneumonia, unspecified laterality     B 8. 4   ALB  3.5   --    --    --    --    --    --    NA  138   < >  143  146*  144  141  143   K  4.2   < >  4.7  4.6  4.7  5.2*  4.6   CL  106   < >  114*  114*  113*  109  107   CO2  23.0   < >  21.0*  23.0  23.0  25.0  27.0   ALKPHO  61   --    --    -- mg 25 mg Oral Daily   furosemide (LASIX) tab 40 mg 40 mg Oral QOD   ipratropium-albuterol (DUONEB) nebulizer solution 3 mL 3 mL Nebulization QID   acetaminophen (TYLENOL) tab 650 mg 650 mg Oral Q6H PRN   ondansetron HCl (ZOFRAN) injection 4 mg 4 mg Lia Spare

## 2018-11-12 NOTE — CONSULTS
[facility]  Vascular Surgery Consultation    Giorgio Emmanuel Patient Status:  Inpatient    10/3/1932 MRN [id number]   [facility] 5NW-A Attending Marin Kebede MD   Hosp Day # 6 PCP None Pcp     Reason for Consultation:  Right upper ext new RV lead placed due to high impedance-Dr. Lucio Jones   • PACEMAKER  05/25/2011    [facility] [employer] Pacemaker   • REPAIR ROTATOR CUFF,ACUTE      left     Family History   Problem Relation Age of Onset   • Stroke Father    • Cancer Mother         Cancer - stomach drainage/congestion  CHEST/CVS: denies cough, sputum, trouble breathing/SOB, chest pain, STEWART  GI: denies abdominal pain, heartburn, diarrhea, blood in bm, tarry bm, constipation,    : denies difficulty urinating, pain, blood in urine, or frequency  SKIN: allowing me to participate in the care of your patient.     Dot Palafox MD  11/11/2018  7:21 PM

## 2018-11-12 NOTE — PROGRESS NOTES
[facility]  Report of Consultation    Krishna Person Patient Status:  Inpatient    10/3/1932 MRN [id number]   [facility] 5NW-A Attending Haylee Hensley MD   Hosp Day # 7 PCP None Pcp     Reason for Consultation:  RUE DVT, now with like but when active he uses 6 liters. He denies abnormal bleeding. He has had occasional rectal bleeding due to hemorrhoids but this does not occur frequently.       History:  Past Medical History:   Diagnosis Date   • Anemia    • Arrhythmia 2010    heart block Facility-Administered Medications:   •  TraMADol HCl (ULTRAM) tab 50 mg, 50 mg, Oral, Q6H PRN  •  Polyvinyl Alcohol (LIQUI-TEARS) 1.4 % ophthalmic solution 1 drop, 1 drop, Ophthalmic, QID PRN  •  morphINE sulfate (PF) 4 MG/ML injection 2 mg, 2 mg, Intraven edema  NEUROLOGIC: alert and oriented x 3; affect appropriate  SKIN: no rash      Laboratory Data:    Lab Results   Component Value Date    WBC 18.3 11/12/2018    HGB 11.1 11/12/2018    HCT 33.4 11/12/2018    .0 11/12/2018    CREATSERUM 1.65 11/12/2 degenerative changes seen within the right shoulder. 3. There is edema throughout the posterior and lateral right shoulder. Heterogeneous attenuation within the right deltoid muscle is nonspecific but could represent myositis.   4. Extensive emphysematous

## 2018-11-12 NOTE — PROGRESS NOTES
[facility] Hospitalist Progress Note     [place] Patient Status:  Inpatient    10/3/1932 MRN [id number]   [facility] 5NW-A Attending Jaya Roman MD   Hosp Day # 7 PCP None Pcp     CC: follow up    SUBJECTIVE:  Overnight pt had increasi 11/05/18 2027   ALT  22   AST  16   ALB  3.5       No results for input(s): PGLU in the last 168 hours.     Imaging:  Us Venous Doppler Arm Bilat - Diag Img (cpt=93970)    Result Date: 11/11/2018  CONCLUSION:  Positive for right-sided DVT, with occlusive shoulder CT noncontrast STAT  - trend HgB q8hr  - hx biceps tendon rupture - ortho consulted    # Leukocytosis  - WBC 10 -> 18  - possibly multifactorial 2/2 above and IV steroids  - pt afebrile  - hold off infectious w/u for now (being treated for PNA)

## 2018-11-12 NOTE — PROGRESS NOTES
[facility] Cardiology Progress Note - Mason Briscoe Patient Status:  Inpatient    10/3/1932 MRN [id number]   [facility] 5NW-A Attending Diego Joseph MD   Hosp Day # 7 PCP None Pcp     Subjective:  c/o arm/shoulder Output 1400 ml   Net -801.76 ml       Last 3 Weights  11/12/18 0522 : 173 lb 14.4 oz (78.9 kg)  11/11/18 0426 : 171 lb 3.2 oz (77.7 kg)  11/10/18 0425 : 172 lb 1.6 oz (78.1 kg)  11/08/18 0452 : 177 lb 3.2 oz (80.4 kg)  11/07/18 0455 : 170 lb 8 oz (77.3 k 4.7  5.2*  4.6   CL  111  114*  114*  113*  109  107   CO2  20.0*  21.0*  23.0  23.0  25.0  27.0   BUN  44*  42*  45*  48*  40*  48*   CREATSERUM  2.07*  1.57*  1.48*  1.48*  1.42*  1.65*   CA  7.9*  7.8*  8.0*  8.3  8.7  8.4   MG  2.0  2.2  2.4   --    -- stable  Constitutiona: no recent fevers  Skin: denies any unusual skin lesions or rashes  Eyes: no visual complaints or deficits  HEENT: denies nasal congestion, sinus pain; hearing loss negative  Respiratory: denies shortness of breath, wheezing or cough

## 2018-11-12 NOTE — PROGRESS NOTES
Pt complained of Right shoulder pain overnight starting around 0300. Was treated with ice packs and tylenol, did not improve and swelling got worse. Pt also had right eye redness, not dry eye, that has also progressed overnight, no change in vision.   Pt d

## 2018-11-13 NOTE — PLAN OF CARE
DISCHARGE PLANNING    • Discharge to home or other facility with appropriate resources Progressing        Impaired Activities of Daily Living    • Achieve highest/safest level of independence in self care Progressing        Impaired Functional Mobility Hoarseness     Pleural effusion     At risk for falling     COPD with acute exacerbation (HCC)     COPD exacerbation (HCC)     Cardiac pacemaker in situ     Postural dizziness with presyncope     Syncopal episodes     Acute bronchitis     Acute bronchitis

## 2018-11-13 NOTE — PROGRESS NOTES
[facility]  Report of Consultation    Candace Carlton Patient Status:  Inpatient    10/3/1932 MRN [id number]   [facility] 5NW-A Attending Raimundo Calero MD   Hosp Day # 8 PCP None Pcp     Reason for Consultation:  RUE DVT, now with like Cataract     sx 3 mo ago alfonso eyes   • Chicken pox    • COPD (chronic obstructive pulmonary disease) (HCC)    • COPD (chronic obstructive pulmonary disease) (HCC)     Management:  sleeps with oxygen 2 L via NC at night   • Deep vein thrombosis ([address]    • Es sulfate (PF) 4 MG/ML injection 2 mg, 2 mg, Intravenous, Q1H PRN **OR** morphINE sulfate (PF) 4 MG/ML injection 4 mg, 4 mg, Intravenous, Q1H PRN **OR** morphINE sulfate (PF) 4 MG/ML injection 8 mg, 8 mg, Intravenous, Q1H PRN  •  predniSONE (DELTASONE) tab 4 appropriate  SKIN: no rash    Results for Rocio Vazquez (MRN [id number]) as of 11/13/2018 09:22   Ref.  Range 11/12/2018 15:41 11/13/2018 00:48 11/13/2018 06:33   Hemoglobin Latest Ref Range: 13.0 - 17.0 g/dL 11.4 (L) 10.6 (L) 10.8 (L)     Laboratory Data: atelectasis and/or scarring. Degenerative changes in the spine. Degenerative changes noted within the right shoulder. Scattered atherosclerosis. Partially visualized cardiac electrodes.     =====  CONCLUSION:    1.  Moderate-size right glenohumeral j basilar atelectasis/infiltrates.      Dictated by: Diana Ortega MD on 11/12/2018 at 7:10     Approved by: Diana Ortega MD            Us Aspiration/injection Major Joint Incld Imag Right(tyv=[id number])    Result Date: 11/12/2018  CONCLUSION:  Successful

## 2018-11-13 NOTE — CONSULTS
[facility]  Report of Consultation    Dean Marquez Patient Status:  Inpatient    10/3/1932 MRN [id number]   [facility] 5NW-A Attending Crystal Jolly MD   [address] Day # 7 PCP None Pcp     Reason for Consultation:  R shoulder pain normal coronaries, normal right heart pressures   • CATARACT     • [facility]   • CATARACT EXTRACTION Left 08/14/2018    [facility] Dr Nichelle Arora   • [facility]. SPLY LEAD PACEMAKER NON TRANSVENOUS Losartan Potassium (COZAAR) tab 25 mg, 25 mg, Oral, Daily  •  furosemide (LASIX) tab 40 mg, 40 mg, Oral, QOD  •  ipratropium-albuterol (DUONEB) nebulizer solution 3 mL, 3 mL, Nebulization, QID  •  acetaminophen (TYLENOL) tab 650 mg, 650 mg, Oral, Q6H PRN Laboratory Data:  Lab Results   Component Value Date    WBC 18.3 11/12/2018    HGB 11.4 11/12/2018    HCT 33.4 11/12/2018    .0 11/12/2018    CREATSERUM 1.65 11/12/2018    BUN 48 11/12/2018     11/12/2018    K 4.6 11/12/2018     11/1 repeat the aspiration  Please have the patient f/u with Dr. Debbie Mcqueen as outpatient    Whit Scale  11/12/2018  7:53 PM

## 2018-11-13 NOTE — CONSULTS
[facility]    PATIENT'S NAME: Clint Panda   ATTENDING PHYSICIAN: Crow Hsu MD   CONSULTING PHYSICIAN: Arash Anaya M.D.    PATIENT ACCOUNT#:   [de-identified]    LOCATION:  [address] [place] [address]  MEDICAL RECORD #:   [id number]       DATE OF BIRTH:  1

## 2018-11-13 NOTE — PROGRESS NOTES
[facility] Hospitalist Progress Note     Thor Free Patient Status:  Inpatient    10/3/1932 MRN [id number]   [facility] 5NW-A Attending Ayesha Vaca MD   Hosp Day # 8 PCP None Pcp     CC: follow up    SUBJECTIVE:  Better since yesterday --    PHOS   --   3.6   --    --    --    --    GLU  149*  158*  178*  141*  150*  102*       Recent Labs   Lab  11/13/18   0633   ALT  32   AST  16   ALB  2.7*       No results for input(s): PGLU in the last 168 hours.     Imaging:  Ct Shoulder Right (cpt= consulted, no thrombolysis indicated  - no clear provoking factor  - hematology consulted, apprec recs  - hypercoag w/u pending  - heparin gtt started 11/11, d/c'd d/t below  - hx LLE DVT diagnosed 12/2017, attributed to immobility/sedentary lifestyle  - h

## 2018-11-13 NOTE — PROGRESS NOTES
[facility] Cardiology Progress Note - Zelalem Head Patient Status:  Inpatient    10/3/1932 MRN [id number]   [facility] 5NW-A Attending Robina Jarvis MD   Hosp Day # 8 PCP None Pcp     Subjective:  Patient states s Leukocytosis     Acute kidney injury ([facility] [address]     Community acquired pneumonia     Metabolic alkalosis     Community acquired pneumonia, unspecified laterality     Bronchitis     Fever, unspecified fever cause      Objective:   Temp: 97.7 °F (36.5 °C)  Pulse: --   14.5*   HGB  11.1*  10.8*  10.9*  11.1*  11.4*  10.6*  10.8*   MCV  95.7  96.2  95.4  94.4   --    --   95.3   PLT  158.0  182.0  173.0  195.0   --    --   177.0   BAND   --    --    --    --    --    --   1   INR   --    --    --    --   0.97   -- MCG/INH inhaler 1 puff 1 puff Inhalation Daily   Losartan Potassium (COZAAR) tab 25 mg 25 mg Oral Daily   furosemide (LASIX) tab 40 mg 40 mg Oral QOD   ipratropium-albuterol (DUONEB) nebulizer solution 3 mL 3 mL Nebulization QID   acetaminophen (TYLENOL) t

## 2018-11-13 NOTE — PROGRESS NOTES
[facility]  Progress Note    Dean Tate Patient Status:  Inpatient    10/3/1932 MRN [id number]   [facility] [place] 5NW-A Attending Crystal Jolly MD   Hosp Day # 8 PCP None Pcp     Assessment and Plan:     1.  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, left     Acute deep vein thrombosis (DVT) of popliteal vein of left lower extremity ([facility] 11/12/18   0530  11/13/18   0633  11/13/18   1521   GLU  158*  178*  141*  150*  102*   --    BUN  45*  48*  40*  48*  49*   --    CREATSERUM  1.48*  1.48*  1.42*  1.65*  1.77*   --    GFRAA  49*  49*  51*  43*  39*   --    GFRNAA  42*  42*  44*  37*  34* Pantoprazole Sodium (PROTONIX) EC tab 20 mg 20 mg Oral QAM AC   Umeclidinium Bromide (INCRUSE ELLIPTA) 62.5 MCG/INH inhaler 1 puff 1 puff Inhalation Daily   Losartan Potassium (COZAAR) tab 25 mg 25 mg Oral Daily   furosemide (LASIX) tab 40 mg 40 mg Oral

## 2018-11-14 NOTE — PHYSICAL THERAPY NOTE
Patient politely declined restorative ambulation. \"I know I have to walk, but my legs are very weak right now. \"  Encouraged patient to increase activity in order to avoid further deconditioning. Will re-attempt as able.

## 2018-11-14 NOTE — PROGRESS NOTES
[facility]  Progress Note    Candace Carlton Patient Status:  Inpatient    10/3/1932 MRN [id number]   [facility] 5NW-A Attending Ami Amor MD   Hosp Day # 9 PCP None Pcp     Subjective:  Candace Carlton is a(n) 80year old male ion from shoulder remains negative    Radiology:  From chest x-ray with bibasilar infiltrates seems about the same though increased earlier this month  Difficult to rule out small effusions      Medications reviewed     Assessment and Plan:   Patient Active Pr pacemaker  8. R shoulder swelling- c/w hematoma formation in setting [facility] -ongoing pain with appreciated  9.  Subconjunctival hemorrhage right eye, most likely due to heparin, joselin by ophtalm      Plan encouraged to take Tylenol for pain

## 2018-11-14 NOTE — PROGRESS NOTES
[facility] Hospitalist Progress Note     Mosesclaire Sanchez Patient Status:  Inpatient    10/3/1932 MRN [id number]   [facility] 5NW-A Attending Laurent Gutierrez MD   Hosp Day # 9 PCP None Pcp     CC: follow up    SUBJECTIVE:  Feeling exhausted.  STEWART --    --    GLU  158*  178*  141*  150*  102*   --   106*       Recent Labs   Lab  11/13/18   0633   ALT  32   AST  16   ALB  2.7*       Meds:   Scheduled Medication:  • GuaiFENesin ER  600 mg Oral BID   • acetylcysteine  4 mL Nebulization TID   • predni O2 stable at baseline home level. Check CXR.       # Hx of CHB, PPM  - cards consulted, apprec recs     # Elevated pro-BNP  - no evidence of CHF exacerbation per cards  - continue home PO diuretics - furosemide 40mg PO every other day  - echo results as abo

## 2018-11-14 NOTE — PROGRESS NOTES
[facility] Cardiology Progress Note - Zelalem Head Patient Status:  Inpatient    10/3/1932 MRN [id number]   [facility] 5NW-A Attending Tami Lieberman MD   Hosp Day # 9 PCP None Pcp     Subjective:   The patient complai of popliteal vein of left lower extremity (HCC)     Leukocytosis     Acute kidney injury ([facility] [address]     Community acquired pneumonia     Metabolic alkalosis     Community acquired pneumonia, unspecified laterality     Bronchitis     Fever, unspecified fever cau 11/14/18   0629   WBC  11.1  10.1  18.3*   --    --   14.5*  13.6*   HGB  10.8*  10.9*  11.1*  11.4*  10.6*  10.8*  9.9*   MCV  96.2  95.4  94.4   --    --   95.3  95.5   PLT  182.0  173.0  195.0   --    --   177.0  166.0   BAND   --    --    --    --    - Intravenous Q1H PRN   Or      morphINE sulfate (PF) 4 MG/ML injection 8 mg 8 mg Intravenous Q1H PRN   predniSONE (DELTASONE) tab 40 mg 40 mg Oral Daily with breakfast   Albuterol Sulfate  (90 Base) MCG/ACT inhaler 2 puff 2 puff Inhalation Q4H PRN

## 2018-11-14 NOTE — PROGRESS NOTES
[facility]  Report of Consultation    Valeria Cuellar Patient Status:  Inpatient    10/3/1932 MRN [id number]   [facility] 5NW-A Attending Ry Monroe MD   Hosp Day # 9 PCP None Pcp     Reason for Consultation:  RUE DVT, now with like frequently.       History:  Past Medical History:   Diagnosis Date   • Anemia    • Arrhythmia 2010    heart block with PPM   • Cataract     sx 3 mo ago alfonso eyes   • Chicken pox    • COPD (chronic obstructive pulmonary disease) (HCC)    • COPD (chronic obstr 17 g, 17 g, Oral, Daily PRN  •  bisacodyl (DULCOLAX) rectal suppository 10 mg, 10 mg, Rectal, Daily PRN  •  furosemide (LASIX) injection 20 mg, 20 mg, Intravenous, Once  •  guaiFENesin (ROBITUSSIN) 100mg/5ml LIQUID 100 mg, 100 mg, Oral, Q4H PRN  •  GuaiFEN %.     GENERAL: well developed, in no apparent distress  EYES: sclera anicteric  HEENT: normocephalic, O2 NC  NECK: supple  RESPIRATORY: clear to auscultation  CARDIOVASCULAR: S1, S2 normal  ABDOMEN: soft, nontender  EXTREMITIES: Right UE--extensive bruisi pleural plaques. Extensive emphysematous change is seen within the right lung apex. There is scattered atelectasis and/or scarring. Degenerative changes in the spine. Degenerative changes noted within the right shoulder.   Scattered atherosclero h/h at least daily. 3. No NSAIDS or ASA. Ice and elevate as able. 4. I reviewed current CT chest and old CT chest with Radiology yesterday. No evidence of occult malignancy.   MRI of shoulder would be reasonable to ensure no occult issue exists that con

## 2018-11-14 NOTE — CONSULTS
[facility]  Report of Inpatient Wound Care Consultation     Kenneth Estrada Patient Status:  Inpatient    10/3/1932 MRN [id number]   [facility] 5NW-A Attending Atson Long MD   Hosp Day # 9 PCP None Pcp     REASON FOR CONSULT:  RAFAEL u Pacemaker   • [address]      left     Social History    Tobacco Use      Smoking status: Former Smoker        Packs/day: 1.00        Years: 40.00        Pack years: 40        Types: Cigarettes        Quit date: 6/16/1990        Years since periwound edema      PLAN OF CARE:   Will continue to follow peripherally, RN to change every 48 hours or as needed.      WOUND CARE DISCHARGE RECOMMENDATIONS:   Noted above      Thank you for this consultation and for allowing me to participate in the care

## 2018-11-14 NOTE — PHYSICAL THERAPY NOTE
New PT orders received, chart reviewed.   Pt being followed by mobility team, ambulating multiple laps in hallway without physical assist.  Will again defer to mobility/restorative team.  D/w RN re: arm hematoma and rec to consult OT for rather than PT.

## 2018-11-14 NOTE — PLAN OF CARE
DISCHARGE PLANNING    • Discharge to home or other facility with appropriate resources Progressing        Impaired Activities of Daily Living    • Achieve highest/safest level of independence in self care Progressing        Impaired Functional Mobility Pacemaker St.Jj-DEP     CKD (chronic kidney disease) stage 3, GFR 30-59 ml/min ([facility])     Hypertension     Pneumonia     Notalgia paresthetica     Inflamed seborrheic keratosis     Hoarseness     Pleural effusion     At risk for falling     COPD with acu

## 2018-11-14 NOTE — PHYSICAL THERAPY NOTE
Patient presented sitting upright in bedside chair; VSS and agreeable to participate. Transferred sit>stand w/supervision assist.  Performed (4) 100' circumambulations sans AD w/ (4) standing rest breaks for each one.   Patient maintained >90% SpO2 6LNC th

## 2018-11-15 NOTE — PROGRESS NOTES
[facility]  Report of Consultation    Candace Carlton Patient Status:  Inpatient    10/3/1932 MRN [id number]   [facility] 5NW-A Attending Raimundo Calero MD   Hosp Day # 10 PCP None Pcp     Reason for Consultation:  PURNIMA BURRIS, now with [facility] the pain. He has severe COPD and has chronic shortness of breath, had not noticed any change. He is on home oxygen - usually 3 liters but when active he uses 6 liters. He denies abnormal bleeding.  He has had occasional rectal bleeding due to hemorrhoids bu reports that he does not use drugs.     Allergies:    Dye [Radiology Cont*        Comment:Throat swells    Medications:    Current Facility-Administered Medications:   •  iron sucrose (VENOFER) IV Push 200 mg, 200 mg, Intravenous, Daily  •  Senna-Docusate S Systems:  A 10-point review of systems was done with pertinent positives and negatives per the HPI. Physical Exam:   Blood pressure 125/77, pulse 88, temperature 97.6 °F (36.4 °C), temperature source Oral, resp.  rate 22, height 1.727 m (5' 8\"), weight CHEST (CPT=71250), 9/26/2018, 7:23. INDICATIONS:  Swelling/ possible hematoma  FINDINGS:       There is a moderate size right glenohumeral joint effusion. There is edema within the posterior and lateral right shoulder.   There is heterogeneous attenuat behavior. PLAN:  1. Hold all anticoagulation for now. 2. Follow h/h at least daily. 3. No NSAIDS or ASA. Ice and elevate as able. 4. I reviewed current CT chest and old CT chest with Radiology yesterday. No evidence of occult malignancy.   Further

## 2018-11-15 NOTE — WOUND PROGRESS NOTE
[facility]  Report of Inpatient Wound Care Progress Note    Martene Rising Patient Status:  Inpatient    10/3/1932 MRN [id number]   [facility] 5NW-A Attending Joe Pickens MD   Hosp Day # 10 PCP None Pcp       SUBJECTIVE:  Patient se years: 40        Types: Cigarettes        Quit date: 1990        Years since quittin.4      Smokeless tobacco: Never Used    Alcohol use: Yes      Comment: 3 drinks per week    Drug use: No      Allergies:  @ALLERGY    Laboratory Data:  Recent La [address] [facility], [address] [place] [address] Rd  [phone]  [address]: [phone]  VM: [phone]

## 2018-11-15 NOTE — PROGRESS NOTES
[facility] Cardiology Progress Note - Zeallem Head Patient Status:  Inpatient    10/3/1932 MRN [id number]   [facility] 5NW-A Attending Tami Lieberman MD   Hosp Day # 10 PCP None Pcp     Subjective:  Feeling better, le extremity ([facility]     Leukocytosis     Acute kidney injury ([facility]     Community acquired pneumonia     Metabolic alkalosis     Community acquired pneumonia, unspecified laterality     Bronchitis     Fever, unspecified fever cause      Objective:   Temp: 97.6 5070  11/15/18   0614   WBC  10.1  18.3*   --    --   14.5*  13.6*  14.5*   HGB  10.9*  11.1*  11.4*  10.6*  10.8*  9.9*  9.7*   MCV  95.4  94.4   --    --   95.3  95.5  95.5   PLT  173.0  195.0   --    --   177.0  166.0  163.0   BAND   --    --    --    - Q1H PRN   predniSONE (DELTASONE) tab 40 mg 40 mg Oral Daily with breakfast   Albuterol Sulfate  (90 Base) MCG/ACT inhaler 2 puff 2 puff Inhalation Q4H PRN   Fluticasone Furoate-Vilanterol (BREO ELLIPTA) 200-25 MCG/INH inhaler 1 puff 1 puff Inhalatio

## 2018-11-15 NOTE — PROGRESS NOTES
Pt is A/Ox4.  02 3L NC , also his base, 6L with exertion. sats >93%. Pt states his SOB is better than yesterday, right elbow skin tear still bleeding dressing was changed, wound care called and they will be up to see pt. IS and flutter valve. Tele V-paced.

## 2018-11-15 NOTE — OCCUPATIONAL THERAPY NOTE
OCCUPATIONAL THERAPY EVALUATION - INPATIENT     Room Number: 852/263-A  Evaluation Date: 11/15/2018  Type of Evaluation: Re-evaluation  Presenting Problem: PNA    Physician Order: IP Consult to Occupational Therapy  Reason for Therapy: ADL/IADL Dysfunction to high impedance-Dr. Ekaterina Renteria   • PACEMAKER  05/25/2011    St Jj Pacemaker   • REPAIR ROTATOR CUFF,ACUTE      left       OCCUPATIONAL PROFILE    HOME SITUATION  Type of Home: House  Home Layout: One level  Lives With: Family    Toilet and Equipment: Sta from another person does the patient currently need…  -   Putting on and taking off regular lower body clothing?: A Little  -   Bathing (including washing, rinsing, drying)?: A Little  -   Toileting, which includes using toilet, bedpan or urinal? : A Littl Deficits LOW - No comorbidities nor modifications of tasks    Clinical Decision Making LOW - Analysis of occupational profile, problem-focused assessments, limited treatment options    Overall Complexity LOW     OT Discharge Recommendations: Home       LAUREN

## 2018-11-15 NOTE — HOME CARE LIAISON
Patient now agreeable to [facility] for RN services only at this time. Called and left voice message with daughter, Radha Strong, to confirm home address since patient will be staying with daughter when d/c home.   Also patient does not currently ha

## 2018-11-15 NOTE — CM/SW NOTE
Care Management/BPCI:    Met with patient at bedside to explain the BPCI/Medicare program. Patient agreed with phone f/u for 3 months from 0 [facility] after discharge from [facility]. Patient was enrolled under .  BPCI/Medicare Letter and [place]

## 2018-11-15 NOTE — PROGRESS NOTES
[facility]  Progress Note    Jerrell Siddiqi Patient Status:  Inpatient    10/3/1932 MRN [id number]   [facility] 5NW-A Attending Sohail Verdin MD   Hosp Day # 10 PCP None Pcp     Subjective:  Jerrell Siddiqi is a(n) 80year old male rem INR   --    --   0.97   PTT  275.1*  225.5*  22.8*       Cultures: Sputum Gram stain with WBCs on no significant organisms others reviewed    Radiology:  Chest x-rays reviewed      Medications reviewed     Assessment and Plan:   Patient Active Problem Pradip Gomez pacemaker  8. R shoulder swelling- c/w hematoma formation in setting [facility] -ongoing pain with appreciated  9.  Subconjunctival hemorrhage right eye, resolving    Ongoing pain control  Continue to increase activity  Continue attempts at bron

## 2018-11-15 NOTE — CM/SW NOTE
Spoke with Sveta Vaca from [facility] who confirmed pt/family now in agreement with plan for [facility] [place]. / to remain available for support and/or discharge planning.      [facility] healthcare  P:[phone]  J:[phone]

## 2018-11-15 NOTE — PROGRESS NOTES
[facility] Hospitalist Progress Note     Raynell Night Patient Status:  Inpatient    10/3/1932 MRN [id number]   [facility] 5NW-A Attending Brayden Copeland MD   Hosp Day # 10 PCP None Pcp     CC: follow up    SUBJECTIVE:  Sitting up in chair. 11/13/18   0633   ALT  32   AST  16   ALB  2.7*       Meds:   Scheduled Medication:  • iron sucrose  200 mg Intravenous Daily   • acetylcysteine  4 mL Nebulization TID   • furosemide  40 mg Intravenous BID (Diuretic)   • Senna-Docusate Sodium  2 tablet Andi Rizvi pulm recs  - CXR noted     # Hx of CHB, PPM  - cards consulted, apprec recs     # Elevated pro-BNP  - no evidence of CHF exacerbation per cards  - continue home PO diuretics - furosemide 40mg PO every other day  - echo results as above EF intact, elevated

## 2018-11-15 NOTE — PROGRESS NOTES
Dressing changed on right am due to saturation, left arm open to air. Plan to page wound care tomorrow to come evaluate.

## 2018-11-16 NOTE — PROGRESS NOTES
[facility]  Progress Note    Dean Tate Patient Status:  Inpatient    10/3/1932 MRN [id number]   [facility] 5NW-A Attending Jones Moore MD   Hosp Day # 6 PCP None Pcp     Subjective:  Dean Tate is a(n) 80year old male rem with Pseudomonas Levaquin sensitive    Radiology:  Reviewed      Medications reviewed     Assessment and Plan:   Patient Active Problem List:     COPD (chronic obstructive pulmonary disease) ([facility]     AV block, 2nd degree     Pacemaker St.Jj-DEP     CKD a permanent pacemaker  9. R shoulder swelling- c/w hematoma formation in setting [facility] --Improved pain ongoing arm weeping with wound service to see  10.  subconjunctival hemorrhage right eye, resolving    Plan at this time to continue slo

## 2018-11-16 NOTE — PROGRESS NOTES
[facility]  Report of Consultation    Luis Denver Patient Status:  Inpatient    10/3/1932 MRN [id number]   [facility] 5NW-A Attending Padmini Jacobsen MD   Hosp Day # 6 PCP None Pcp     Reason for Consultation:  PURNIMA BURRIS, now with [facility] shortness of breath, had not noticed any change. He is on home oxygen - usually 3 liters but when active he uses 6 liters. He denies abnormal bleeding. He has had occasional rectal bleeding due to hemorrhoids but this does not occur frequently.       Histor drugs.     Allergies:    Dye [Radiology Cont*        Comment:Throat swells    Medications:    Current Facility-Administered Medications:   •  iron sucrose (VENOFER) IV Push 200 mg, 200 mg, Intravenous, Daily  •  acetylcysteine (MUCOMYST) 20 % solution 4 mL, Systems:  A 10-point review of systems was done with pertinent positives and negatives per the HPI. Physical Exam:   Blood pressure 107/60, pulse 84, temperature 97.7 °F (36.5 °C), temperature source Oral, resp.  rate 18, height 1.727 m (5' 8\"), weight basilic vein. PROCEDURE:  CT SHOULDER RIGHT (CPT=73200)  COMPARISON:  [facility] , CT CHEST (KGX=64607), 9/26/2018, 7:23. INDICATIONS:  Swelling/ possible hematoma  FINDINGS:       There is a moderate size right glenohumeral joint effusion.      There is ed evidence of occult malignancy. Further imaging of shoulder would be reasonable (in a few weeks once acute situation has resolved)  to ensure no occult issue exists that contributed to VTE and then bleed.   Timing of this best served in a few weeks once blo

## 2018-11-16 NOTE — PROGRESS NOTES
[facility] Cardiology Progress Note - Karlene Marte Patient Status:  Inpatient    10/3/1932 MRN [id number]   [facility] 5NW-A Attending Niurka Cordero MD   Hosp Day # 11 PCP None Pcp     Subjective:  Feels ok.  Shoulder 1508  Gross per 24 hour   Intake 600 ml   Output 2325 ml   Net -1725 ml       Last 3 Weights  11/16/18 0437 : 168 lb 8 oz (76.4 kg)  11/15/18 0528 : 171 lb 11.2 oz (77.9 kg)  11/12/18 0522 : 173 lb 14.4 oz (78.9 kg)  11/11/18 0426 : 171 lb 3.2 oz (77.7 kg) 0214  11/13/18   1521  11/14/18   0629  11/15/18   0614  11/16/18   0609   NA  143  141   --   139  140  141   K  4.6  5.3*  5.7*  4.7  4.4  4.5   CL  107  104   --   109  102  102   CO2  27.0  30.0   --   28.0  30.0  31.0   BUN  48*  49*   --   50*  44* ELLIPTA) 200-25 MCG/INH inhaler 1 puff 1 puff Inhalation Daily   Pantoprazole Sodium (PROTONIX) EC tab 20 mg 20 mg Oral QAM AC   Umeclidinium Bromide (INCRUSE ELLIPTA) 62.5 MCG/INH inhaler 1 puff 1 puff Inhalation Daily   ipratropium-albuterol (DUONEB) neb

## 2018-11-16 NOTE — PROGRESS NOTES
[facility] Hospitalist Progress Note     Akosua Whipple Patient Status:  Inpatient    10/3/1932 MRN [id number]   [facility] 5NW-A Attending Tasha Keen MD   Hosp Day # 6 PCP None Pcp     CC: follow up    SUBJECTIVE:  Sitting up in chair. (Diuretic)   • levofloxacin  750 mg Oral QOD   • [START ON 11/17/2018] predniSONE  35 mg Oral Daily with breakfast   • iron sucrose  200 mg Intravenous Daily   • Senna-Docusate Sodium  2 tablet Oral BID   • GuaiFENesin ER  600 mg Oral BID   • Fluticasone F of CHF exacerbation per cards  - IV lasix per cardiology  - echo results as above EF intact, elevated PA pressure     # CKD  - Cr baseline appears 1.4-1.8  - monitor BMP  - continue ARB     # Hyperkalemia - resolved     # HTN  - home losartan     # GERD

## 2018-11-16 NOTE — PLAN OF CARE
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

## 2018-11-16 NOTE — CONSULTS
[facility] Pharmacy Note:  Renal Adjustment for levaquin     Brisa Parnell is a 80year old male who has been prescribed levofloxacin (LEVAQUIN) 500 mg every 48 hrs. CrCl is estimated creatinine clearance is 33.1 mL/min (A) (based on SCr of 1.55 mg/dL (H)).  so

## 2018-11-16 NOTE — WOUND PROGRESS NOTE
[facility]  Inpatient Wound Care Contact Note    Selam Graves Patient Status:  Inpatient    10/3/1932 MRN [id number]   [facility] 5NW-A Attending Sheree Allen MD   Hosp Day # 6 PCP None Pcp     Called by RN regarding continued ble

## 2018-11-17 NOTE — PLAN OF CARE
Received patient a/ox4. o2 sats on 3L maintained 97% overnight. No new complaints. He was updated on plan of care and verbalizes understanding.     DISCHARGE PLANNING    • Discharge to home or other facility with appropriate resources Progressing        Imp

## 2018-11-17 NOTE — PROGRESS NOTES
[facility]  Progress Note    Shobha Chao Patient Status:  Inpatient    10/3/1932 MRN [id number]   [facility] 5NW-A Attending Nick Burnham MD   [facility] Day # 12 PCP None Pcp     Subjective:  Shobha Chao is a(n) 80year old male.  No 13.1 05/25/2011    INR 0.97 11/12/2018    INR 0.99 11/05/2018    INR 1.12 (H) 10/29/2017     Cultures:   Sputum - PSAR pan sensitive    Radiology:  No new chest imaging    Medications reviewed     Assessment and Plan:     1.  Suspected right lower lobe pneu

## 2018-11-17 NOTE — PROGRESS NOTES
[facility] Hospitalist Progress Note     Amarjit Betsysara Patient Status:  Inpatient    10/3/1932 MRN [id number]   [facility] 5NW-A Attending Laurent Gutierrez MD   Hosp Day # 12 PCP None Pcp     CC: follow up    SUBJECTIVE:  Feeling \"not that go (Diuretic)   • levofloxacin  750 mg Oral QOD   • predniSONE  35 mg Oral Daily with breakfast   • Senna-Docusate Sodium  2 tablet Oral BID   • GuaiFENesin ER  600 mg Oral BID   • Fluticasone Furoate-Vilanterol  1 puff Inhalation Daily   • Pantoprazole Sodiu per cards  - IV lasix per cardiology  - echo results as above EF intact, elevated PA pressure     # CKD  - Cr baseline appears 1.4-1.8  - monitor BMP  - continue ARB     # Hyperkalemia - resolved     # HTN  - home losartan     # GERD   - PPI    # Acute con

## 2018-11-18 NOTE — PLAN OF CARE
Received patient a/ox4. O2 sats on 3L maintained % overnight. He has no new complaints. Patient updated on plan of care and verbalizes understanding.     DISCHARGE PLANNING    • Discharge to home or other facility with appropriate resources Adequate f

## 2018-11-18 NOTE — PROGRESS NOTES
NURSING DISCHARGE NOTE    Discharged Home via Wheelchair. Accompanied by Family member  Belongings Taken by patient/family. Pt is assessed and ready for discharge.  Instructions and follow up directions gone over with patient and daughter at bedsi

## 2018-11-18 NOTE — PROGRESS NOTES
[facility]  Progress Note    Sage Frankel Patient Status:  Inpatient    10/3/1932 MRN [id number]   [facility] 5NW-A Attending Dede Gaviria MD   [facility] Day # 15 PCP None Pcp     Subjective:  Sage Frankel is a(n) 80year old male.  No CO2  30.0  31.0  33.0*   --      Lab Results   Component Value Date    PT 13.1 05/25/2011    INR 0.97 11/12/2018    INR 0.99 11/05/2018    INR 1.12 (H) 10/29/2017     Cultures:   Sputum - PSAR pan sensitive    Radiology:  No new chest imaging    Medicati

## 2018-11-20 NOTE — CM/SW NOTE
11/20/18 0900   Discharge disposition   Expected discharge disposition Home-Health   Name of [facility] Yang Jorge 1 services after discharge Skilled home care     Patient discharged on 11/18/2018 as previously planned.

## 2018-11-28 NOTE — DISCHARGE SUMMARY
General Medicine Discharge Summary     Patient ID:  Jackie Hardin  80year old  10/3/1932    Admit date: 11/5/2018    Discharge date and time: 11/18/2018  3:46 PM     Attending Physician: Joselyn att. 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 PLEURA:  Normal.  No pleural effusions. BONES:  Normal for age. CONCLUSION:  Marked emphysematous changes are again noted. No definite consolidation.      Dictated by: Trini Lemos MD on 11/05/2018 at 20:58     Approved by: Claudia Willingham attenuation within the right deltoid muscle is nonspecific but could represent myositis. 4. Extensive emphysematous change is seen within the right lung apex.  5. Calcified pleural plaques are nonspecific but correlation for asbestos exposure is recommended recurrent thromboembolic disease  TECHNIQUE:  Real time, grey scale, and duplex ultrasound was used to evaluate the lower extremity venous system. B-mode two-dimensional images of the vascular structures, Doppler spectral analysis, and color flow.   Doppler 11/12/2018  PROCEDURE:  XR CHEST AP PORTABLE  (CPT=71045)  TECHNIQUE:  AP chest radiograph was obtained. COMPARISON:  [facility] , XR CHEST AP PORTABLE  (CPT=71045), 11/09/2018, 5:23. [facility] , XR CHEST AP PORTABLE  (CPT=71045), 11/08/2018, 5:42.   INDICATIONS PORTABLE  (CPT=71045)  TECHNIQUE:  AP chest radiograph was obtained. COMPARISON:  [facility] , XR CHEST PA + LAT CHEST (CPT=71046), 11/05/2018, 20:38. [facility] , CT CHEST (QIC=85814), 5/14/2018, 10:10.   [facility] , XR CHEST AP PORTABLE  (CPT=71045), 11/06/2018, 6 will aspirate under fluoroscopy. to be done @ 1300. DESCRIPTION:  Witnessed verbal and written informed consent was obtained. Time out was performed by the staff. The patient was informed of the nature of the procedure, alternatives and risks.   Dionisio Whitley mouth once daily. , Script not printed, Disp-30 tablet, R-6    triamcinolone acetonide 0.1 % External Cream  Apply to affected areas of extremities BID x 2 weeks, then PRN. , Normal, Disp-80 g, R-2    Biotin 10 MG Oral Cap  Take 10 mg by mouth daily. , Histor

## 2018-11-29 PROBLEM — E87.3 METABOLIC ALKALOSIS: Status: RESOLVED | Noted: 2018-01-01 | Resolved: 2018-01-01

## 2018-11-29 PROBLEM — R04.2 HEMOPTYSIS: Status: RESOLVED | Noted: 2017-02-09 | Resolved: 2018-01-01

## 2018-11-29 PROBLEM — J20.9 ACUTE BRONCHITIS: Status: RESOLVED | Noted: 2017-02-09 | Resolved: 2018-01-01

## 2018-11-29 PROBLEM — J18.9 COMMUNITY ACQUIRED PNEUMONIA, UNSPECIFIED LATERALITY: Status: RESOLVED | Noted: 2018-01-01 | Resolved: 2018-01-01

## 2018-11-29 PROBLEM — J18.9 COMMUNITY ACQUIRED PNEUMONIA: Status: RESOLVED | Noted: 2018-01-01 | Resolved: 2018-01-01

## 2018-11-29 PROBLEM — J40 BRONCHITIS: Status: RESOLVED | Noted: 2018-01-01 | Resolved: 2018-01-01

## 2018-11-29 PROBLEM — J44.9 CHRONIC OBSTRUCTIVE PULMONARY DISEASE, UNSPECIFIED COPD TYPE (HCC): Status: RESOLVED | Noted: 2017-03-24 | Resolved: 2018-01-01

## 2018-11-29 PROBLEM — R06.00 DYSPNEA ON EXERTION: Status: RESOLVED | Noted: 2017-09-19 | Resolved: 2018-01-01
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
## 2018-11-29 PROBLEM — N28.9 RENAL INSUFFICIENCY: Status: RESOLVED | Noted: 2017-02-09 | Resolved: 2018-01-01

## 2018-11-29 PROBLEM — N17.9 ACUTE KIDNEY INJURY (HCC): Status: RESOLVED | Noted: 2018-01-01 | Resolved: 2018-01-01

## 2018-11-29 PROBLEM — R73.9 HYPERGLYCEMIA: Status: RESOLVED | Noted: 2017-10-29 | Resolved: 2018-01-01

## 2018-11-29 PROBLEM — R42 DIZZINESS: Status: RESOLVED | Noted: 2017-10-29 | Resolved: 2018-01-01

## 2018-11-29 PROBLEM — J20.9 ACUTE BRONCHITIS, UNSPECIFIED ORGANISM: Status: RESOLVED | Noted: 2017-02-09 | Resolved: 2018-01-01

## 2018-11-29 PROBLEM — D72.829 LEUKOCYTOSIS: Status: RESOLVED | Noted: 2018-01-01 | Resolved: 2018-01-01

## 2018-11-29 PROBLEM — B34.9 VIRAL SYNDROME: Status: RESOLVED | Noted: 2017-09-19 | Resolved: 2018-01-01

## 2018-11-29 PROBLEM — R50.9 FEVER, UNSPECIFIED FEVER CAUSE: Status: RESOLVED | Noted: 2018-01-01 | Resolved: 2018-01-01

## 2018-12-19 PROBLEM — M25.012: Status: ACTIVE | Noted: 2018-01-01

## 2019-01-01 ENCOUNTER — HOSPITAL ENCOUNTER (INPATIENT)
Facility: HOSPITAL | Age: 84
LOS: 10 days | Discharge: HOME HEALTH | DRG: 299 | End: 2019-01-01
Attending: HOSPITALIST | Admitting: HOSPITALIST
Payer: MEDICARE

## 2019-01-01 ENCOUNTER — SNF VISIT (OUTPATIENT)
Dept: INTERNAL MEDICINE CLINIC | Age: 84
End: 2019-01-01

## 2019-01-01 ENCOUNTER — NURSE ONLY (OUTPATIENT)
Dept: LAB | Age: 84
End: 2019-01-01
Attending: FAMILY MEDICINE
Payer: MEDICARE

## 2019-01-01 ENCOUNTER — NURSE ONLY (OUTPATIENT)
Dept: LAB | Age: 84
End: 2019-01-01
Attending: INTERNAL MEDICINE
Payer: MEDICARE

## 2019-01-01 ENCOUNTER — APPOINTMENT (OUTPATIENT)
Dept: GENERAL RADIOLOGY | Facility: HOSPITAL | Age: 84
DRG: 299 | End: 2019-01-01
Attending: INTERNAL MEDICINE
Payer: MEDICARE

## 2019-01-01 ENCOUNTER — APPOINTMENT (OUTPATIENT)
Dept: NUCLEAR MEDICINE | Facility: HOSPITAL | Age: 84
DRG: 299 | End: 2019-01-01
Attending: HOSPITALIST
Payer: MEDICARE

## 2019-01-01 ENCOUNTER — HOSPITAL ENCOUNTER (INPATIENT)
Facility: HOSPITAL | Age: 84
LOS: 2 days | Discharge: NURSING, REHAB OR LONG-TERM CARE | DRG: 124 | End: 2019-01-01
Attending: HOSPITALIST | Admitting: INTERNAL MEDICINE
Payer: MEDICARE

## 2019-01-01 ENCOUNTER — SNF DISCHARGE (OUTPATIENT)
Dept: INTERNAL MEDICINE CLINIC | Age: 84
End: 2019-01-01

## 2019-01-01 ENCOUNTER — LABORATORY ENCOUNTER (OUTPATIENT)
Dept: LAB | Age: 84
End: 2019-01-01
Attending: INTERNAL MEDICINE
Payer: MEDICARE

## 2019-01-01 ENCOUNTER — INITIAL APN SNF VISIT (OUTPATIENT)
Dept: INTERNAL MEDICINE CLINIC | Age: 84
End: 2019-01-01

## 2019-01-01 ENCOUNTER — MOBILE ENCOUNTER (OUTPATIENT)
Dept: FAMILY MEDICINE CLINIC | Facility: CLINIC | Age: 84
End: 2019-01-01

## 2019-01-01 ENCOUNTER — LABORATORY ENCOUNTER (OUTPATIENT)
Dept: LAB | Age: 84
DRG: 299 | End: 2019-01-01
Attending: INTERNAL MEDICINE
Payer: MEDICARE

## 2019-01-01 ENCOUNTER — EXTERNAL FACILITY (OUTPATIENT)
Dept: FAMILY MEDICINE CLINIC | Facility: CLINIC | Age: 84
End: 2019-01-01

## 2019-01-01 ENCOUNTER — LAB ENCOUNTER (OUTPATIENT)
Dept: LAB | Age: 84
End: 2019-01-01
Attending: INTERNAL MEDICINE
Payer: MEDICARE

## 2019-01-01 ENCOUNTER — APPOINTMENT (OUTPATIENT)
Dept: CV DIAGNOSTICS | Facility: HOSPITAL | Age: 84
DRG: 299 | End: 2019-01-01
Attending: INTERNAL MEDICINE
Payer: MEDICARE

## 2019-01-01 ENCOUNTER — LAB REQUISITION (OUTPATIENT)
Dept: LAB | Facility: HOSPITAL | Age: 84
End: 2019-01-01
Payer: MEDICARE

## 2019-01-01 VITALS
WEIGHT: 164.63 LBS | OXYGEN SATURATION: 100 % | TEMPERATURE: 98 F | DIASTOLIC BLOOD PRESSURE: 86 MMHG | HEART RATE: 84 BPM | SYSTOLIC BLOOD PRESSURE: 110 MMHG | RESPIRATION RATE: 18 BRPM | BODY MASS INDEX: 26 KG/M2

## 2019-01-01 VITALS
SYSTOLIC BLOOD PRESSURE: 109 MMHG | HEART RATE: 64 BPM | RESPIRATION RATE: 18 BRPM | BODY MASS INDEX: 26 KG/M2 | DIASTOLIC BLOOD PRESSURE: 62 MMHG | OXYGEN SATURATION: 97 % | WEIGHT: 163.38 LBS | TEMPERATURE: 98 F

## 2019-01-01 VITALS
TEMPERATURE: 98 F | HEART RATE: 73 BPM | DIASTOLIC BLOOD PRESSURE: 76 MMHG | OXYGEN SATURATION: 97 % | RESPIRATION RATE: 18 BRPM | SYSTOLIC BLOOD PRESSURE: 141 MMHG

## 2019-01-01 VITALS
SYSTOLIC BLOOD PRESSURE: 109 MMHG | RESPIRATION RATE: 18 BRPM | DIASTOLIC BLOOD PRESSURE: 75 MMHG | TEMPERATURE: 99 F | BODY MASS INDEX: 24 KG/M2 | OXYGEN SATURATION: 91 % | WEIGHT: 153 LBS | HEART RATE: 97 BPM

## 2019-01-01 VITALS
OXYGEN SATURATION: 98 % | HEART RATE: 68 BPM | SYSTOLIC BLOOD PRESSURE: 106 MMHG | TEMPERATURE: 97 F | BODY MASS INDEX: 26 KG/M2 | RESPIRATION RATE: 18 BRPM | WEIGHT: 162 LBS | DIASTOLIC BLOOD PRESSURE: 60 MMHG

## 2019-01-01 VITALS
OXYGEN SATURATION: 95 % | RESPIRATION RATE: 18 BRPM | WEIGHT: 158.31 LBS | TEMPERATURE: 98 F | BODY MASS INDEX: 25 KG/M2 | HEART RATE: 92 BPM | DIASTOLIC BLOOD PRESSURE: 61 MMHG | SYSTOLIC BLOOD PRESSURE: 111 MMHG

## 2019-01-01 VITALS
OXYGEN SATURATION: 97 % | RESPIRATION RATE: 18 BRPM | TEMPERATURE: 97 F | BODY MASS INDEX: 24 KG/M2 | DIASTOLIC BLOOD PRESSURE: 71 MMHG | SYSTOLIC BLOOD PRESSURE: 111 MMHG | WEIGHT: 152.38 LBS | HEART RATE: 86 BPM

## 2019-01-01 VITALS
DIASTOLIC BLOOD PRESSURE: 56 MMHG | TEMPERATURE: 97 F | RESPIRATION RATE: 18 BRPM | OXYGEN SATURATION: 98 % | SYSTOLIC BLOOD PRESSURE: 94 MMHG | HEART RATE: 67 BPM

## 2019-01-01 VITALS
HEART RATE: 95 BPM | DIASTOLIC BLOOD PRESSURE: 71 MMHG | OXYGEN SATURATION: 95 % | WEIGHT: 161.13 LBS | TEMPERATURE: 98 F | SYSTOLIC BLOOD PRESSURE: 123 MMHG | BODY MASS INDEX: 26 KG/M2 | RESPIRATION RATE: 20 BRPM

## 2019-01-01 VITALS
BODY MASS INDEX: 26 KG/M2 | DIASTOLIC BLOOD PRESSURE: 70 MMHG | OXYGEN SATURATION: 100 % | SYSTOLIC BLOOD PRESSURE: 117 MMHG | HEART RATE: 62 BPM | TEMPERATURE: 97 F | RESPIRATION RATE: 19 BRPM | WEIGHT: 162 LBS

## 2019-01-01 DIAGNOSIS — I50.32 CHRONIC DIASTOLIC CONGESTIVE HEART FAILURE (HCC): ICD-10-CM
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
## 2019-01-01 DIAGNOSIS — B02.30 HERPES ZOSTER WITH OPHTHALMIC COMPLICATION, UNSPECIFIED HERPES ZOSTER EYE DISEASE: Primary | ICD-10-CM

## 2019-01-01 DIAGNOSIS — B02.30 ZOSTER OPHTHALMICUS: ICD-10-CM

## 2019-01-01 DIAGNOSIS — J44.9 CHRONIC OBSTRUCTIVE PULMONARY DISEASE, UNSPECIFIED COPD TYPE (HCC): ICD-10-CM

## 2019-01-01 DIAGNOSIS — R53.1 GENERALIZED WEAKNESS: ICD-10-CM

## 2019-01-01 DIAGNOSIS — I82.432 ACUTE DEEP VEIN THROMBOSIS (DVT) OF POPLITEAL VEIN OF LEFT LOWER EXTREMITY (HCC): ICD-10-CM

## 2019-01-01 DIAGNOSIS — R53.1 GENERALIZED WEAKNESS: Primary | ICD-10-CM

## 2019-01-01 DIAGNOSIS — I50.9 CHRONIC CONGESTIVE HEART FAILURE, UNSPECIFIED HEART FAILURE TYPE (HCC): ICD-10-CM

## 2019-01-01 DIAGNOSIS — Z74.1 REQUIRES ASSISTANCE WITH ACTIVITIES OF DAILY LIVING (ADL): ICD-10-CM

## 2019-01-01 DIAGNOSIS — I82.402 ACUTE DEEP VEIN THROMBOSIS (DVT) OF LEFT LOWER EXTREMITY, UNSPECIFIED VEIN (HCC): ICD-10-CM

## 2019-01-01 DIAGNOSIS — N18.30 CKD (CHRONIC KIDNEY DISEASE) STAGE 3, GFR 30-59 ML/MIN (HCC): ICD-10-CM

## 2019-01-01 DIAGNOSIS — E11.22 TYPE 2 DIABETES MELLITUS WITH DIABETIC CHRONIC KIDNEY DISEASE (HCC): ICD-10-CM

## 2019-01-01 DIAGNOSIS — N18.9 CHRONIC KIDNEY DISEASE, UNSPECIFIED: Primary | ICD-10-CM

## 2019-01-01 DIAGNOSIS — B02.8 OTITIS EXTERNA DUE TO HERPES ZOSTER: Primary | ICD-10-CM

## 2019-01-01 DIAGNOSIS — E11.65 CONTROLLED TYPE 2 DIABETES MELLITUS WITH HYPERGLYCEMIA, WITHOUT LONG-TERM CURRENT USE OF INSULIN (HCC): Primary | ICD-10-CM

## 2019-01-01 DIAGNOSIS — I50.31 ACUTE DIASTOLIC HEART FAILURE (HCC): ICD-10-CM

## 2019-01-01 DIAGNOSIS — Z95.0 PACEMAKER: ICD-10-CM

## 2019-01-01 DIAGNOSIS — J15.9 PNEUMONIA, BACTERIAL: Primary | ICD-10-CM

## 2019-01-01 DIAGNOSIS — B02.9 SHINGLES: Primary | ICD-10-CM

## 2019-01-01 DIAGNOSIS — I13.0 HYPERTENSIVE HEART AND CHRONIC KIDNEY DISEASE WITH HEART FAILURE AND STAGE 1 THROUGH STAGE 4 CHRONIC KIDNEY DISEASE, OR CHRONIC KIDNEY DISEASE (HCC): ICD-10-CM

## 2019-01-01 DIAGNOSIS — N17.9 AKI (ACUTE KIDNEY INJURY) (HCC): ICD-10-CM

## 2019-01-01 DIAGNOSIS — J96.11 CHRONIC RESPIRATORY FAILURE WITH HYPOXIA (HCC): ICD-10-CM

## 2019-01-01 DIAGNOSIS — N18.2 CHRONIC KIDNEY DISEASE, STAGE II (MILD): ICD-10-CM

## 2019-01-01 DIAGNOSIS — I50.31 ACUTE DIASTOLIC HEART FAILURE (HCC): Primary | ICD-10-CM

## 2019-01-01 DIAGNOSIS — J44.1 CHRONIC OBSTRUCTIVE PULMONARY DISEASE WITH ACUTE EXACERBATION (HCC): ICD-10-CM

## 2019-01-01 DIAGNOSIS — K62.5 PAINLESS RECTAL BLEEDING: ICD-10-CM

## 2019-01-01 DIAGNOSIS — J44.9 COPD (CHRONIC OBSTRUCTIVE PULMONARY DISEASE) (HCC): Primary | ICD-10-CM

## 2019-01-01 DIAGNOSIS — R55 SYNCOPE, UNSPECIFIED SYNCOPE TYPE: ICD-10-CM

## 2019-01-01 LAB
ANION GAP SERPL CALC-SCNC: 8 MMOL/L (ref 0–18)
BASOPHILS # BLD AUTO: 0.04 X10(3) UL (ref 0–0.2)
BASOPHILS NFR BLD AUTO: 0.5 %
BUN BLD-MCNC: 61 MG/DL (ref 7–18)
BUN/CREAT SERPL: 31.6 (ref 10–20)
CALCIUM BLD-MCNC: 9.2 MG/DL (ref 8.5–10.1)
CHLORIDE SERPL-SCNC: 99 MMOL/L (ref 98–112)
CO2 SERPL-SCNC: 33 MMOL/L (ref 21–32)
CREAT BLD-MCNC: 1.93 MG/DL (ref 0.7–1.3)
DEPRECATED RDW RBC AUTO: 61.1 FL (ref 35.1–46.3)
EOSINOPHIL # BLD AUTO: 0.06 X10(3) UL (ref 0–0.7)
EOSINOPHIL NFR BLD AUTO: 0.7 %
ERYTHROCYTE [DISTWIDTH] IN BLOOD BY AUTOMATED COUNT: 17 % (ref 11–15)
GLUCOSE BLD-MCNC: 210 MG/DL (ref 70–99)
HCT VFR BLD AUTO: 37.8 % (ref 39–53)
HGB BLD-MCNC: 12 G/DL (ref 13–17.5)
IMM GRANULOCYTES # BLD AUTO: 0.17 X10(3) UL (ref 0–1)
IMM GRANULOCYTES NFR BLD: 2 %
LYMPHOCYTES # BLD AUTO: 1.35 X10(3) UL (ref 1–4)
LYMPHOCYTES NFR BLD AUTO: 15.9 %
MCH RBC QN AUTO: 31.9 PG (ref 26–34)
MCHC RBC AUTO-ENTMCNC: 31.7 G/DL (ref 31–37)
MCV RBC AUTO: 100.5 FL (ref 80–100)
MONOCYTES # BLD AUTO: 0.71 X10(3) UL (ref 0.1–1)
MONOCYTES NFR BLD AUTO: 8.4 %
NEUTROPHILS # BLD AUTO: 6.15 X10 (3) UL (ref 1.5–7.7)
NEUTROPHILS # BLD AUTO: 6.15 X10(3) UL (ref 1.5–7.7)
NEUTROPHILS NFR BLD AUTO: 72.5 %
OSMOLALITY SERPL CALC.SUM OF ELEC: 313 MOSM/KG (ref 275–295)
PLATELET # BLD AUTO: 242 10(3)UL (ref 150–450)
POTASSIUM SERPL-SCNC: 3.9 MMOL/L (ref 3.5–5.1)
RBC # BLD AUTO: 3.76 X10(6)UL (ref 3.8–5.8)
SODIUM SERPL-SCNC: 140 MMOL/L (ref 136–145)
WBC # BLD AUTO: 8.5 X10(3) UL (ref 4–11)

## 2019-01-01 PROCEDURE — 93306 TTE W/DOPPLER COMPLETE: CPT | Performed by: INTERNAL MEDICINE

## 2019-01-01 PROCEDURE — 99232 SBSQ HOSP IP/OBS MODERATE 35: CPT | Performed by: INTERNAL MEDICINE

## 2019-01-01 PROCEDURE — 99309 SBSQ NF CARE MODERATE MDM 30: CPT | Performed by: NURSE PRACTITIONER

## 2019-01-01 PROCEDURE — 71045 X-RAY EXAM CHEST 1 VIEW: CPT | Performed by: INTERNAL MEDICINE

## 2019-01-01 PROCEDURE — 80048 BASIC METABOLIC PNL TOTAL CA: CPT

## 2019-01-01 PROCEDURE — 08J1XZZ INSPECTION OF LEFT EYE, EXTERNAL APPROACH: ICD-10-PCS | Performed by: OPHTHALMOLOGY

## 2019-01-01 PROCEDURE — 87070 CULTURE OTHR SPECIMN AEROBIC: CPT

## 2019-01-01 PROCEDURE — 99316 NF DSCHRG MGMT 30 MIN+: CPT | Performed by: NURSE PRACTITIONER

## 2019-01-01 PROCEDURE — 85025 COMPLETE CBC W/AUTO DIFF WBC: CPT | Performed by: INTERNAL MEDICINE

## 2019-01-01 PROCEDURE — 85025 COMPLETE CBC W/AUTO DIFF WBC: CPT

## 2019-01-01 PROCEDURE — 99222 1ST HOSP IP/OBS MODERATE 55: CPT | Performed by: INTERNAL MEDICINE

## 2019-01-01 PROCEDURE — 99308 SBSQ NF CARE LOW MDM 20: CPT | Performed by: NURSE PRACTITIONER

## 2019-01-01 PROCEDURE — 80048 BASIC METABOLIC PNL TOTAL CA: CPT | Performed by: INTERNAL MEDICINE

## 2019-01-01 PROCEDURE — 80053 COMPREHEN METABOLIC PANEL: CPT

## 2019-01-01 PROCEDURE — 87205 SMEAR GRAM STAIN: CPT

## 2019-01-01 PROCEDURE — 99358 PROLONG SERVICE W/O CONTACT: CPT | Performed by: FAMILY MEDICINE

## 2019-01-01 PROCEDURE — 08J0XZZ INSPECTION OF RIGHT EYE, EXTERNAL APPROACH: ICD-10-PCS | Performed by: OPHTHALMOLOGY

## 2019-01-01 PROCEDURE — 85007 BL SMEAR W/DIFF WBC COUNT: CPT

## 2019-01-01 PROCEDURE — 99306 1ST NF CARE HIGH MDM 50: CPT | Performed by: FAMILY MEDICINE

## 2019-01-01 PROCEDURE — 85027 COMPLETE CBC AUTOMATED: CPT

## 2019-01-01 PROCEDURE — 78582 LUNG VENTILAT&PERFUS IMAGING: CPT | Performed by: HOSPITALIST

## 2019-01-01 PROCEDURE — 36415 COLL VENOUS BLD VENIPUNCTURE: CPT

## 2019-01-01 PROCEDURE — 4B02XSZ MEASUREMENT OF CARDIAC PACEMAKER, EXTERNAL APPROACH: ICD-10-PCS | Performed by: INTERNAL MEDICINE

## 2019-01-01 RX ORDER — HEPARIN SODIUM AND DEXTROSE 10000; 5 [USP'U]/100ML; G/100ML
INJECTION INTRAVENOUS CONTINUOUS
Status: DISCONTINUED | OUTPATIENT
Start: 2019-01-01 | End: 2019-01-01

## 2019-01-01 RX ORDER — ACETAMINOPHEN 325 MG/1
650 TABLET ORAL EVERY 6 HOURS PRN
Qty: 30 TABLET | Refills: 0 | Status: SHIPPED | OUTPATIENT
Start: 2019-01-01 | End: 2019-01-01

## 2019-01-01 RX ORDER — GUAIFENESIN 600 MG
600 TABLET, EXTENDED RELEASE 12 HR ORAL 2 TIMES DAILY
Status: DISCONTINUED | OUTPATIENT
Start: 2019-01-01 | End: 2019-01-01

## 2019-01-01 RX ORDER — FUROSEMIDE 10 MG/ML
40 INJECTION INTRAMUSCULAR; INTRAVENOUS
Status: COMPLETED | OUTPATIENT
Start: 2019-01-01 | End: 2019-01-01

## 2019-01-01 RX ORDER — ACYCLOVIR 400 MG/1
800 TABLET ORAL EVERY 8 HOURS
Status: DISCONTINUED | OUTPATIENT
Start: 2019-01-01 | End: 2019-01-01

## 2019-01-01 RX ORDER — FUROSEMIDE 40 MG/1
40 TABLET ORAL 3 TIMES DAILY
Status: DISCONTINUED | OUTPATIENT
Start: 2019-01-01 | End: 2019-01-01

## 2019-01-01 RX ORDER — FUROSEMIDE 10 MG/ML
40 INJECTION INTRAMUSCULAR; INTRAVENOUS ONCE
Status: COMPLETED | OUTPATIENT
Start: 2019-01-01 | End: 2019-01-01

## 2019-01-01 RX ORDER — ACETAMINOPHEN 325 MG/1
650 TABLET ORAL EVERY 6 HOURS PRN
Status: DISCONTINUED | OUTPATIENT
Start: 2019-01-01 | End: 2019-01-01

## 2019-01-01 RX ORDER — TROPICAMIDE 10 MG/ML
1 SOLUTION/ DROPS OPHTHALMIC ONCE
Status: COMPLETED | OUTPATIENT
Start: 2019-01-01 | End: 2019-01-01

## 2019-01-01 RX ORDER — LORAZEPAM 0.5 MG/1
0.5 TABLET ORAL EVERY 6 HOURS PRN
Status: DISCONTINUED | OUTPATIENT
Start: 2019-01-01 | End: 2019-01-01

## 2019-01-01 RX ORDER — METHYLPREDNISOLONE SODIUM SUCCINATE 40 MG/ML
40 INJECTION, POWDER, LYOPHILIZED, FOR SOLUTION INTRAMUSCULAR; INTRAVENOUS EVERY 8 HOURS
Status: DISCONTINUED | OUTPATIENT
Start: 2019-01-01 | End: 2019-01-01

## 2019-01-01 RX ORDER — IPRATROPIUM BROMIDE AND ALBUTEROL SULFATE 2.5; .5 MG/3ML; MG/3ML
3 SOLUTION RESPIRATORY (INHALATION) 4 TIMES DAILY
Status: DISCONTINUED | OUTPATIENT
Start: 2019-01-01 | End: 2019-01-01

## 2019-01-01 RX ORDER — PREDNISONE 1 MG/1
15 TABLET ORAL 2 TIMES DAILY WITH MEALS
Qty: 9 TABLET | Refills: 0 | Status: SHIPPED | OUTPATIENT
Start: 2019-01-01 | End: 2019-01-01

## 2019-01-01 RX ORDER — FUROSEMIDE 20 MG/1
20 TABLET ORAL DAILY
Status: DISCONTINUED | OUTPATIENT
Start: 2019-01-01 | End: 2019-01-01

## 2019-01-01 RX ORDER — FLUTICASONE PROPIONATE 50 MCG
1 SPRAY, SUSPENSION (ML) NASAL DAILY
Status: DISCONTINUED | OUTPATIENT
Start: 2019-01-01 | End: 2019-01-01

## 2019-01-01 RX ORDER — POTASSIUM CHLORIDE 20 MEQ/1
40 TABLET, EXTENDED RELEASE ORAL ONCE
Status: COMPLETED | OUTPATIENT
Start: 2019-01-01 | End: 2019-01-01

## 2019-01-01 RX ORDER — PREDNISONE 1 MG/1
15 TABLET ORAL 2 TIMES DAILY WITH MEALS
Qty: 30 TABLET | Refills: 0 | Status: SHIPPED | OUTPATIENT
Start: 2019-01-01 | End: 2019-01-01

## 2019-01-01 RX ORDER — FUROSEMIDE 10 MG/ML
40 INJECTION INTRAMUSCULAR; INTRAVENOUS
Status: DISCONTINUED | OUTPATIENT
Start: 2019-01-01 | End: 2019-01-01

## 2019-01-01 RX ORDER — METHYLPREDNISOLONE SODIUM SUCCINATE 125 MG/2ML
60 INJECTION, POWDER, LYOPHILIZED, FOR SOLUTION INTRAMUSCULAR; INTRAVENOUS ONCE
Status: COMPLETED | OUTPATIENT
Start: 2019-01-01 | End: 2019-01-01

## 2019-01-01 RX ORDER — BLOOD SUGAR DIAGNOSTIC
STRIP MISCELLANEOUS
Qty: 100 STRIP | Refills: 0 | Status: SHIPPED | OUTPATIENT
Start: 2019-01-01

## 2019-01-01 RX ORDER — AZITHROMYCIN 250 MG/1
250 TABLET, FILM COATED ORAL
Status: DISCONTINUED | OUTPATIENT
Start: 2019-01-01 | End: 2019-01-01

## 2019-01-01 RX ORDER — PREDNISONE 10 MG/1
TABLET ORAL
Qty: 100 TABLET | Refills: 1 | Status: ON HOLD | OUTPATIENT
Start: 2019-01-01 | End: 2019-01-01

## 2019-01-01 RX ORDER — PREDNISONE 20 MG/1
20 TABLET ORAL DAILY
Status: DISCONTINUED | OUTPATIENT
Start: 2019-01-01 | End: 2019-01-01

## 2019-01-01 RX ORDER — ACETYLCYSTEINE 200 MG/ML
2 SOLUTION ORAL; RESPIRATORY (INHALATION) 2 TIMES DAILY
Status: DISCONTINUED | OUTPATIENT
Start: 2019-01-01 | End: 2019-01-01

## 2019-01-01 RX ORDER — DEXTROSE MONOHYDRATE 25 G/50ML
50 INJECTION, SOLUTION INTRAVENOUS
Status: DISCONTINUED | OUTPATIENT
Start: 2019-01-01 | End: 2019-01-01

## 2019-01-01 RX ORDER — IPRATROPIUM BROMIDE AND ALBUTEROL SULFATE 2.5; .5 MG/3ML; MG/3ML
3 SOLUTION RESPIRATORY (INHALATION)
Status: DISCONTINUED | OUTPATIENT
Start: 2019-01-01 | End: 2019-01-01

## 2019-01-01 RX ORDER — METHYLPREDNISOLONE SODIUM SUCCINATE 125 MG/2ML
60 INJECTION, POWDER, LYOPHILIZED, FOR SOLUTION INTRAMUSCULAR; INTRAVENOUS EVERY 8 HOURS
Status: DISCONTINUED | OUTPATIENT
Start: 2019-01-01 | End: 2019-01-01

## 2019-01-01 RX ORDER — BIOTIN 10000 MCG
10 CAPSULE ORAL DAILY
Status: DISCONTINUED | OUTPATIENT
Start: 2019-01-01 | End: 2019-01-01 | Stop reason: RX

## 2019-01-01 RX ORDER — LANCETS
EACH MISCELLANEOUS
Qty: 102 EACH | Refills: 0 | Status: SHIPPED | OUTPATIENT
Start: 2019-01-01 | End: 2019-01-01

## 2019-01-01 RX ORDER — FAMOTIDINE 20 MG/1
20 TABLET ORAL DAILY
Status: DISCONTINUED | OUTPATIENT
Start: 2019-01-01 | End: 2019-01-01

## 2019-01-01 RX ORDER — ALBUTEROL SULFATE 90 UG/1
2 AEROSOL, METERED RESPIRATORY (INHALATION) EVERY 4 HOURS PRN
Status: DISCONTINUED | OUTPATIENT
Start: 2019-01-01 | End: 2019-01-01

## 2019-01-01 RX ORDER — FUROSEMIDE 40 MG/1
40 TABLET ORAL DAILY
Status: DISCONTINUED | OUTPATIENT
Start: 2019-01-01 | End: 2019-01-01

## 2019-01-01 RX ORDER — BLOOD SUGAR DIAGNOSTIC
STRIP MISCELLANEOUS
Qty: 100 STRIP | Refills: 1 | Status: SHIPPED | OUTPATIENT
Start: 2019-01-01 | End: 2019-01-01

## 2019-01-01 RX ORDER — HEPARIN SODIUM AND DEXTROSE 10000; 5 [USP'U]/100ML; G/100ML
18 INJECTION INTRAVENOUS ONCE
Status: COMPLETED | OUTPATIENT
Start: 2019-01-01 | End: 2019-01-01

## 2019-01-01 RX ORDER — PANTOPRAZOLE SODIUM 40 MG/1
40 TABLET, DELAYED RELEASE ORAL
Status: DISCONTINUED | OUTPATIENT
Start: 2019-01-01 | End: 2019-01-01

## 2019-01-01 RX ORDER — BUDESONIDE AND FORMOTEROL FUMARATE DIHYDRATE 160; 4.5 UG/1; UG/1
2 AEROSOL RESPIRATORY (INHALATION)
Status: DISCONTINUED | OUTPATIENT
Start: 2019-01-01 | End: 2019-01-01

## 2019-01-01 RX ORDER — ACETYLCYSTEINE 200 MG/ML
2 SOLUTION ORAL; RESPIRATORY (INHALATION) 3 TIMES DAILY
Status: DISCONTINUED | OUTPATIENT
Start: 2019-01-01 | End: 2019-01-01

## 2019-01-01 RX ORDER — FUROSEMIDE 40 MG/1
40 TABLET ORAL
Status: DISCONTINUED | OUTPATIENT
Start: 2019-01-01 | End: 2019-01-01

## 2019-01-01 RX ORDER — SENNA AND DOCUSATE SODIUM 50; 8.6 MG/1; MG/1
2 TABLET, FILM COATED ORAL 2 TIMES DAILY PRN
Status: DISCONTINUED | OUTPATIENT
Start: 2019-01-01 | End: 2019-01-01

## 2019-01-01 RX ORDER — PREDNISOLONE ACETATE 10 MG/ML
1 SUSPENSION/ DROPS OPHTHALMIC 2 TIMES DAILY
Status: DISCONTINUED | OUTPATIENT
Start: 2019-01-01 | End: 2019-01-01

## 2019-01-01 RX ORDER — PREDNISONE 1 MG/1
20 TABLET ORAL 2 TIMES DAILY WITH MEALS
Qty: 30 TABLET | Refills: 0 | Status: SHIPPED | OUTPATIENT
Start: 2019-01-01 | End: 2019-01-01

## 2019-01-01 RX ORDER — AZITHROMYCIN 250 MG/1
250 TABLET, FILM COATED ORAL
Status: ON HOLD | COMMUNITY
End: 2019-01-01

## 2019-01-01 RX ORDER — AZELASTINE 1 MG/ML
2 SPRAY, METERED NASAL 2 TIMES DAILY
Status: DISCONTINUED | OUTPATIENT
Start: 2019-01-01 | End: 2019-01-01

## 2019-01-01 RX ORDER — PREDNISONE 1 MG/1
20 TABLET ORAL DAILY
Qty: 30 TABLET | Refills: 0 | Status: SHIPPED | OUTPATIENT
Start: 2019-01-01 | End: 2019-01-01

## 2019-01-01 RX ORDER — VALACYCLOVIR HYDROCHLORIDE 500 MG/1
500 TABLET, FILM COATED ORAL 2 TIMES DAILY
Qty: 20 TABLET | Refills: 0 | Status: SHIPPED | OUTPATIENT
Start: 2019-01-01 | End: 2019-01-01

## 2019-01-01 RX ORDER — LANCETS
EACH MISCELLANEOUS
Qty: 102 EACH | Refills: 0 | Status: SHIPPED | OUTPATIENT
Start: 2019-01-01

## 2019-01-01 RX ORDER — HEPARIN SODIUM 5000 [USP'U]/ML
80 INJECTION INTRAVENOUS; SUBCUTANEOUS ONCE
Status: DISCONTINUED | OUTPATIENT
Start: 2019-01-01 | End: 2019-01-01

## 2019-01-01 RX ORDER — SIMETHICONE 80 MG
80 TABLET,CHEWABLE ORAL 4 TIMES DAILY PRN
Status: DISCONTINUED | OUTPATIENT
Start: 2019-01-01 | End: 2019-01-01

## 2019-01-01 RX ORDER — PREDNISONE 20 MG/1
20 TABLET ORAL EVERY OTHER DAY
Status: DISCONTINUED | OUTPATIENT
Start: 2019-01-01 | End: 2019-01-01

## 2019-01-01 RX ORDER — AZITHROMYCIN 250 MG/1
250 TABLET, FILM COATED ORAL
Refills: 4 | COMMUNITY
Start: 2019-01-01

## 2019-01-01 RX ORDER — FUROSEMIDE 40 MG/1
40 TABLET ORAL
Qty: 60 TABLET | Refills: 11 | Status: SHIPPED | OUTPATIENT
Start: 2019-01-01

## 2019-01-01 RX ORDER — PREDNISOLONE ACETATE 10 MG/ML
1 SUSPENSION/ DROPS OPHTHALMIC 2 TIMES DAILY
Qty: 1 BOTTLE | Refills: 0 | Status: SHIPPED | OUTPATIENT
Start: 2019-01-01 | End: 2019-01-01

## 2019-02-14 PROBLEM — M25.511 ACUTE PAIN OF RIGHT SHOULDER: Status: ACTIVE | Noted: 2019-01-01

## 2019-03-21 ENCOUNTER — HISTORICAL (OUTPATIENT)
Dept: ADMINISTRATIVE | Facility: HOSPITAL | Age: 84
End: 2019-03-21

## 2019-03-21 LAB
ABS NEUT (OLG): 4.73 X10(3)/MCL (ref 2.1–9.2)
ALBUMIN SERPL-MCNC: 3.7 GM/DL (ref 3.4–5)
ALBUMIN/GLOB SERPL: 1.3 RATIO (ref 1.1–2)
ALP SERPL-CCNC: 88 UNIT/L (ref 50–136)
ALT SERPL-CCNC: 19 UNIT/L (ref 12–78)
APPEARANCE, UA: CLEAR
AST SERPL-CCNC: 17 UNIT/L (ref 15–37)
BACTERIA SPEC CULT: NORMAL /HPF
BASOPHILS # BLD AUTO: 0 X10(3)/MCL (ref 0–0.2)
BASOPHILS NFR BLD AUTO: 0 %
BILIRUB SERPL-MCNC: 0.8 MG/DL (ref 0.2–1)
BILIRUB UR QL STRIP: NEGATIVE
BILIRUBIN DIRECT+TOT PNL SERPL-MCNC: 0.2 MG/DL (ref 0–0.5)
BILIRUBIN DIRECT+TOT PNL SERPL-MCNC: 0.6 MG/DL (ref 0–0.8)
BUN SERPL-MCNC: 21 MG/DL (ref 7–18)
CALCIUM SERPL-MCNC: 8.9 MG/DL (ref 8.5–10.1)
CHLORIDE SERPL-SCNC: 110 MMOL/L (ref 98–107)
CHOLEST SERPL-MCNC: 188 MG/DL (ref 0–200)
CHOLEST/HDLC SERPL: 3.2 {RATIO} (ref 0–5)
CO2 SERPL-SCNC: 29 MMOL/L (ref 21–32)
COLOR UR: YELLOW
CREAT SERPL-MCNC: 1.03 MG/DL (ref 0.7–1.3)
EOSINOPHIL # BLD AUTO: 0.1 X10(3)/MCL (ref 0–0.9)
EOSINOPHIL NFR BLD AUTO: 2 %
ERYTHROCYTE [DISTWIDTH] IN BLOOD BY AUTOMATED COUNT: 12.3 % (ref 11.5–17)
GLOBULIN SER-MCNC: 2.8 GM/DL (ref 2.4–3.5)
GLUCOSE (UA): NEGATIVE
GLUCOSE SERPL-MCNC: 97 MG/DL (ref 74–106)
HCT VFR BLD AUTO: 47.1 % (ref 42–52)
HDLC SERPL-MCNC: 59 MG/DL (ref 35–60)
HGB BLD-MCNC: 15.5 GM/DL (ref 14–18)
HGB UR QL STRIP: NEGATIVE
KETONES UR QL STRIP: NEGATIVE
LDLC SERPL CALC-MCNC: 115 MG/DL (ref 0–129)
LEUKOCYTE ESTERASE UR QL STRIP: NEGATIVE
LYMPHOCYTES # BLD AUTO: 1.6 X10(3)/MCL (ref 0.6–4.6)
LYMPHOCYTES NFR BLD AUTO: 22 %
MCH RBC QN AUTO: 30.9 PG (ref 27–31)
MCHC RBC AUTO-ENTMCNC: 32.9 GM/DL (ref 33–36)
MCV RBC AUTO: 94 FL (ref 80–94)
MONOCYTES # BLD AUTO: 0.9 X10(3)/MCL (ref 0.1–1.3)
MONOCYTES NFR BLD AUTO: 12 %
NEUTROPHILS # BLD AUTO: 4.73 X10(3)/MCL (ref 2.1–9.2)
NEUTROPHILS NFR BLD AUTO: 64 %
NITRITE UR QL STRIP: NEGATIVE
PH UR STRIP: 6.5 [PH] (ref 5–9)
PLATELET # BLD AUTO: 156 X10(3)/MCL (ref 130–400)
PMV BLD AUTO: 11.8 FL (ref 9.4–12.4)
POTASSIUM SERPL-SCNC: 5.1 MMOL/L (ref 3.5–5.1)
PROT SERPL-MCNC: 6.5 GM/DL (ref 6.4–8.2)
PROT UR QL STRIP: NEGATIVE
RBC # BLD AUTO: 5.01 X10(6)/MCL (ref 4.7–6.1)
RBC #/AREA URNS HPF: NORMAL /[HPF]
SODIUM SERPL-SCNC: 144 MMOL/L (ref 136–145)
SP GR UR STRIP: 1.02 (ref 1–1.03)
SQUAMOUS EPITHELIAL, UA: NORMAL
TRIGL SERPL-MCNC: 70 MG/DL (ref 30–150)
TSH SERPL-ACNC: 4.12 MIU/L (ref 0.36–3.74)
UROBILINOGEN UR STRIP-ACNC: 0.2
VLDLC SERPL CALC-MCNC: 14 MG/DL
WBC # SPEC AUTO: 7.4 X10(3)/MCL (ref 4.5–11.5)
WBC #/AREA URNS HPF: NORMAL /HPF

## 2019-05-16 PROBLEM — I82.409 DVT (DEEP VENOUS THROMBOSIS) (HCC): Status: ACTIVE | Noted: 2019-01-01

## 2019-05-16 NOTE — PROGRESS NOTES
Pharmacy Note: Dietary Supplement Discontinuation Per Policy    Biotin has been discontinued on [place] per policy. This supplement may be restarted upon discharge using the medication reconciliation process.     Thank you,   Leeanne Mcqueen

## 2019-05-16 NOTE — CONSULTS
[facility]    PATIENT'S NAME: Kylie Nguyễn   ATTENDING PHYSICIAN: Norma Varela M.D.   CONSULTING PHYSICIAN: Delia Chowdhury M.D.    PATIENT ACCOUNT#:   [de-identified]    LOCATION:  [address] [facility]  MEDICAL RECORD #:   [id number]       DATE OF BIRTH:  1 swelling of his left greater than right leg. He has had a DVT in that leg in the past.  An ultrasound was performed that shows extensive acute DVT of the left lower extremity, chronic DVT of the right. Patient denies chest pain.   He is always short o

## 2019-05-16 NOTE — H&P
[facility] Hospitalist History and Physical      CC: DVT    PCP: Cassandra Hernandez MD    History of Present Illness: Patient is a 80year old male with PMH sig for COPD, heart block sp pacemaker, and hx of DVTs.  He had LE doppler done today for LE edema and was found Nasal Solution 2 sprays by Nasal route 2 (two) times daily. Disp: 30 mL Rfl: 5   Senna-Docusate Sodium 8.6-50 MG Oral Tab Take 2 tablets by mouth 2 (two) times daily as needed for constipation.  Disp: 60 tablet Rfl: 0   GuaiFENesin  MG Oral Tablet 12 Furoate-Vilanterol (BREO ELLIPTA) 200-25 MCG/INH Inhalation Aerosol Powder, Breath Activated Inhale 1 puff into the lungs daily.  Disp: 1 each Rfl: 6         Current Meds:  Scheduled:   • Azelastine HCl  2 spray Nasal BID   • [START ON 5/17/2019] azithromyc thrombosis in the gastrocnemius vein. Negative    for acute deep and superficial vein thrombosis.   - Left: Partially occlusive acute deep vein thrombosis in the common femoral    that does not extend into the external iliac vein, totally occlusive acute

## 2019-05-16 NOTE — PROGRESS NOTES
Direct admit from [facility] outpatient imaging. Placed on tele, oriented to the unit. Admission navigator completed by admitting nurse. Admission assessment completed. A&O x 4, SPO2 96% on , V Paced on tele. Dr. Radha Macias at bedside.  Extensive LLE DVT, n

## 2019-05-16 NOTE — PROGRESS NOTES
Dr. Ghada Castrejon, vascular surgery, paged for consult. Received call back, with instruction to start Heparin infusion and VQ scan per attending. Patient will be seen by Dr. Ghada Castrejon this evening to decide if patient needs lysis for DVT.  Dr. Teagan Alcocer, [facility]

## 2019-05-16 NOTE — PROGRESS NOTES
Discussed with Dr. Zee Beavers in light of hx of bleeding on heparin. Recommends starting heparin w/o a bolus w/close monitoring of PTTs and VQ scan. Dr. Alfredo Hernandez will see this evening. In agreement with heparin at this time.     Karmen Puente MD  [facility]

## 2019-05-16 NOTE — CONSULTS
[facility] Cardiology Consultation Amanda Boothe MD    The patient was interviewed, examined, the chart was reviewed and the consult was dictated. This is a 80year old male with a chief complaint of leg swelling. Impression:  1.   Acute DVT left lower extre

## 2019-05-16 NOTE — CONSULTS
[facility]  Vascular Surgery Consultation    Kenneth Estrada Patient Status:  Inpatient    10/3/1932 MRN [id number]   [facility] 8NE-A Attending Stalin Abernathy MD   Hosp Day # 0 PCP Akiko Akbar MD     Reason for Consultation:  Left low LEAD PACEMAKER NON TRANSVENOUS SINGLE  2016    new RV lead placed due to high impedance-Dr. Jane Begun   • PACEMAKER  05/25/2011    [employer] Pacemaker   • REPAIR ROTATOR CUFF,ACUTE      left     Family History   Problem Relation Age of Onset   • Stroke Father heartburn, diarrhea, blood in bm, tarry bm, constipation,    : denies difficulty urinating, pain, blood in urine, or frequency  SKIN: denies any unusual skin lesions or rashes  MUSCULOSKELETAL: denies back pain, joint pain, leg swelling  NEURO/EYES: sun anticoagulation. Given patient's overall health he is likely high risk for laying in the prone position for heart catheter directed thrombolysis and I do not recommend proceeding at this time. Recommend elevating his leg and anticoagulation alone.   We wi

## 2019-05-17 NOTE — PROGRESS NOTES
Cardiology follow-up  The pacemaker was interrogated and I discussed case with the [address] [place] [address]. The pacemaker function is entirely normal.  Patient had a short run of nonsustained ventricular tachycardia in March.   His atrial fibrillation burden is les

## 2019-05-17 NOTE — CONSULTS
[facility]  Report of Consultation    Luis Denver Patient Status:  Inpatient    10/3/1932 MRN [id number]   [facility] 8NE-A Attending Padmini Jacobsen MD   [facility] Day # 1 PCP Maritza Mayorga MD       Assessment / Plan:    1) Acute DVT LLE; fibrosis   • Renal disorder     mild   • Sinus problem    • Unspecified essential hypertension    • Visual impairment     contacts     Past Surgical History:   Procedure Laterality Date   • ANGIOGRAM  11/17/2017    normal coronaries, normal right heart Oral, QOD  •  heparin (PORCINE) drip 41534oqwxm/250mL infusion CONTINUOUS, 200-3,000 Units/hr, Intravenous, Continuous  •  acetaminophen (TYLENOL) tab 650 mg, 650 mg, Oral, Q6H PRN  •  Fluticasone Furoate-Vilanterol (BREO ELLIPTA) 200-25 MCG/INH inhaler 1 imaging studies reviewed. Thank you for allowing me to participate in the care of your patient.     Samantha Raymond  5/17/2019  7:46 AM

## 2019-05-17 NOTE — PLAN OF CARE
Problem: Patient/Family Goals  Goal: Patient/Family Long Term Goal  Description  Patient's Long Term Goal: to go home    Interventions:  - resume normal activities  - See additional Care Plan goals for specific interventions   5/16/2019 2126 by Kei Corona

## 2019-05-17 NOTE — PROGRESS NOTES
[facility] Cardiology Progress Note - Nick Ye Patient Status:  Inpatient    10/3/1932 MRN [id number]   [facility] 8NE-A Attending Chastity Eaton MD   Hosp Day # 1 PCP Maritza Mayorga MD     Subjective:  Still supple, no thyromegaly or adenopathy. Neck: No JVD, carotids 2+, no bruits. Cardiac: Regular rate and rhythm. S1, S2 normal. No murmur, pericardial rub, S3, thrill, heave or extra cardiac sounds. Lungs: Clear without wheezes, rales, rhonchi or dullness. Oral QOD   predniSONE (DELTASONE) tab 25 mg 25 mg Oral QOD   heparin (PORCINE) drip 53445mmznw/250mL infusion CONTINUOUS 200-3,000 Units/hr Intravenous Continuous   acetaminophen (TYLENOL) tab 650 mg 650 mg Oral Q6H PRN   Fluticasone Furoate-Vilanterol (BR

## 2019-05-17 NOTE — CONSULTS
[facility]  Report of Consultation    Shobha Chao Patient Status:  Inpatient    10/3/1932 MRN [id number]   [facility] 8NE-A Attending Daniel Hassan MD   Hosp Day # 1 PCP Florence Bhakta MD     Reason for Consultation:  VTE, kno Renal disorder     mild   • Sinus problem    • Unspecified essential hypertension    • Visual impairment     contacts     Past Surgical History:   Procedure Laterality Date   • ANGIOGRAM  11/17/2017    normal coronaries, normal right heart pressures   • CA mg, Oral, QOD  •  heparin (PORCINE) drip [id number]/250mL infusion CONTINUOUS, 200-3,000 Units/hr, Intravenous, Continuous  •  acetaminophen (TYLENOL) tab 650 mg, 650 mg, Oral, Q6H PRN  •  Fluticasone Furoate-Vilanterol (BREO ELLIPTA) 200-25 MCG/INH inhale Latest Ref Range: 13.0 - 17.5 g/dL 14.3 12.7 (L)   11.6 (L)   Hematocrit Latest Ref Range: 39.0 - 53.0 % 44.5 38.7 (L)   36.2 (L)   Platelet Count Latest Ref Range: 150.0 - 450.0 10(3)uL 157.0 135.0 (L)   128.0 (L)   RBC Latest Ref Range: 3.80 - 5.80 x10(6 acute    deep vein thrombosis throughout the femoral, deep femoral, popliteal,    gastrocnemius, posterior tibial and peroneal veins.    ----------------------------------------------------------------------------  CRITICAL FINDINGS    A result from an dajuan !  +------------------+------------------+----------+-------------------------+  ! Right profunda    !Patent            !None      !Normal phasicity;        !  !femoral           !                  !          !spontaneous; normal      !  !                   !  !tibial            !                  !          !compressible             !  +------------------+------------------+----------+-------------------------+  ! Right peroneal    !Patent            !None      !Normal augmentation;     !  !                    !  !                  !                  !          !noncompressible          !  +------------------+------------------+----------+-------------------------+  ! Left superficial  ! Totally occluded  ! Acute     !Noncompressible          !  !femoral proximal  !  !                  !                  !          !augmentation;            !  !                  !                  !          !noncompressible          !  +------------------+------------------+----------+-------------------------+  ! Left cari     soleal, left common femoral, left superficial  femoral, left profunda femoral, left popliteal, left peroneal, left  posterior tibial, left gastrocnemius, and left soleal veins were studied.   Electronically signed by:    Luciano Hurst  [id number] Compression stockings on both legs. Thank you for allowing me to participate in the care of your patient.     Adrianna Mullins  5/17/2019  9:22 AM

## 2019-05-17 NOTE — PLAN OF CARE
Tele monitoring. I/o. Heparin gtt per pe/dvt protocol. Pt sent to Vq scan per order with portable chest xray post. Denies c/o pain at this time. Poa at bedside updated regarding plan of care. Home 02 3-5L nc.  Pt states that he has \"trouble breathing when oxygen saturation or ABGs  - Provide Smoking Cessation handout, if applicable  - Encourage broncho-pulmonary hygiene including cough, deep breathe, Incentive Spirometry  - Assess the need for suctioning and perform as needed  - Assess and instruct to repor

## 2019-05-17 NOTE — PROGRESS NOTES
[facility] Hospitalist Progress Note                                                                   [address] [facility]  10/3/1932    SUBJECTIVE:  Feels a little better today.   LE edema improved w el Senna-Docusate Sodium, acetaminophen    Assessment/Plan:  Active Problems:    DVT (deep venous thrombosis) (HCC)      # Acute LLE DVT, hx of 2 prior DVTs  - as per heme documentation, heparin gtt w no bolus started  - no evidence of bleeding  - Vascular thorne

## 2019-05-17 NOTE — CONSULTS
[facility]  Report of Consultation    Brisa Parnell Patient Status:  Inpatient    10/3/1932 MRN [id number]   [facility] 8NE-A Attending Iona Acosta MD   Hosp Day # 1 PCP Yuan Aden MD     Reason for Consultation:    [facility] Unspecified essential hypertension    • Visual impairment     contacts     Family History   Problem Relation Age of Onset   • Stroke Father    • Cancer Mother         Cancer - stomach   • Eye Problems Brother         Eye disorder   • Other (Other[other]) B fatty acids 1000 MG Oral Cap Take 1,000 mg by mouth daily. Disp:  Rfl:  5/16/2019 at Unknown time   Omeprazole 40 MG Oral Capsule Delayed Release Take 1 capsule (40 mg total) by mouth daily.  Disp: 90 capsule Rfl: 3 5/16/2019 at Unknown time   Multiple Mayra Temp src Pulse Resp SpO2 Weight   05/17/19 1215 116/69 98.2 °F (36.8 °C) Oral 87 16 96 % —   05/17/19 0829 133/72 97.9 °F (36.6 °C) Oral 88 17 98 % —   05/17/19 0456 121/80 98.1 °F (36.7 °C) Oral 82 18 97 % —   05/17/19 0455 — — — — — — 167 lb 15.9 oz (76. degree     Pacemaker St.Jj-DEP     CKD (chronic kidney disease) stage 3, GFR 30-59 ml/min ([facility])     Hypertension     Notalgia paresthetica     Pleural effusion     Syncopal episodes     Anemia     Acute left-sided weakness     Acute deep vein thrombosis

## 2019-05-17 NOTE — PROGRESS NOTES
St. Jj pacemaker successfully interrogated. Alerted [employer] representative. Informed him to call Dr. Katherine Means with the results.

## 2019-05-17 NOTE — PLAN OF CARE
Pt A&O x 4, SPO2 96% on 3L oxygen per nasal cannula, V Paced on tele, resting in bed, Maintained on heparin infusion at 900 units/hr. Next PTT due to 0930.  Current POLST form on chart doesn't have MD signature, daughter will obtain updated POLST form and g Initiate emergency measures for life threatening arrhythmias  - Monitor electrolytes and administer replacement therapy as ordered  Outcome: Progressing     Problem: RESPIRATORY - ADULT  Goal: Achieves optimal ventilation and oxygenation  Description  INTE

## 2019-05-18 NOTE — PROGRESS NOTES
ptt result that was drawn at 0600 was not drawn by the writer for this pt. Lab was notified and made aware that another nurse use the wrong label to a different pt. Writer only did draw the 0500 ptt for this morning.

## 2019-05-18 NOTE — PROGRESS NOTES
[facility] Cardiology Progress Note - Ines Acevedo Patient Status:  Inpatient    10/3/1932 MRN [id number]   [facility] 8NE-A Attending Anila Whitaker MD   Hosp Day # 2 PCP Leonor Figueroa MD     Subjective:  Still and oriented x 3. No apparent distress. No respiratory or constitutional distress. HEENT: Normocephalic, anicteric sclera, neck supple, no thyromegaly or adenopathy. Neck: No JVD, carotids 2+, no bruits. Cardiac: Regular rate and rhythm.  S1, S2 normal. Sodium (SENOKOT S) 8.6-50 MG tab 2 tablet 2 tablet Oral BID PRN   Umeclidinium Bromide (INCRUSE ELLIPTA) 62.5 MCG/INH inhaler 1 puff 1 puff Inhalation Daily   predniSONE (DELTASONE) tab 20 mg 20 mg Oral QOD   predniSONE (DELTASONE) tab 25 mg 25 mg Oral QOD

## 2019-05-18 NOTE — PROGRESS NOTES
[facility]  Progress Note    [place] Patient Status:  Inpatient    10/3/1932 MRN [id number]   [facility] 8NE-A Attending Fabio Holley MD   Hosp Day # 2 PCP Karina Hyatt MD     Reason for Consultation:  DVT    Feeling we auscultation. Heart: Regular rate     Abdomen: Soft, non tender, non distended   Extremities: no LE edema;  R shoulder--normal sized, not warm. Neurological: Grossly intact.      Laboratory Data:    Lab Results   Component Value Date    WBC 11.0 05/18 overall tolerating heparin so far. Was started without bolus due to history of right shoulder bleed with heparin use. 6. He has tolerated Eliquis well in the past and plan to switch to eliquis tomorrow AM   7.  Mild thrombocytopenia and anemia--stable

## 2019-05-18 NOTE — PROGRESS NOTES
[facility]  Progress Note    Carter Hernandez Patient Status:  Inpatient    10/3/1932 MRN [id number]   [facility] 8NE-A Attending Arianna Del Rosario MD   [facility] Day # 2 PCP Errol Cushing, MD     Subjective:  Carter Hernandez is a(n) 80 year Radiology:  rviewed       Medications reviewed     Assessment and Plan:   Patient Active Problem List:     COPD (chronic obstructive pulmonary disease) ([facility] [address]     AV block, 2nd degree     Pacemaker St.Jj-DEP     CKD (chronic kidney disease) stage 3, G

## 2019-05-18 NOTE — PLAN OF CARE
Patient on 3L NC vitals stable. QID nebs. Diminished improvement post neb. Will continue to monitor.

## 2019-05-18 NOTE — PLAN OF CARE
Assumed care at 0730 this AM. Pt alert and oriented, states \"my breathing feels better today. I still feel short of breath when I'm walking around, but feels much better than before. \" Lung sounds clear/diminished, sputum cup left at the bedside-education Goal: to feel better \"not short of breath with activity\".     Interventions:   - heparin gtt per pe/dvt protocol  -ptt scooter 3 hours per Dr order.   -possible IVC in future  -elevate legs on pillow  - See additional Care Plan goals for specific interventi saturation or ABGs  - Provide Smoking Cessation handout, if applicable  - Encourage broncho-pulmonary hygiene including cough, deep breathe, Incentive Spirometry  - Assess the need for suctioning and perform as needed  - Assess and instruct to report SOB o

## 2019-05-18 NOTE — PROGRESS NOTES
Lab/hema called for pending ptt result. Blood for ptt drawn at 0100 and still pending. Awaiting for lab to call back with results.

## 2019-05-18 NOTE — PROGRESS NOTES
[facility] Hospitalist Progress Note                                                                   [address] [facility]  10/3/1932    SUBJECTIVE:  Breathing is better this am.  2l UOP.   Less wheezing 900 Units/hr (05/17/19 1447)     PRN: LORazepam, Albuterol Sulfate HFA, Senna-Docusate Sodium, acetaminophen    Assessment/Plan:  Active Problems:    DVT (deep venous thrombosis) ([facility] [address]      # Acute LLE DVT, hx of 2 prior DVTs  - as per heme documentation,

## 2019-05-18 NOTE — PROGRESS NOTES
[facility]  Nephrology Progress Note    Remus Settler Patient Status:  Inpatient    10/3/1932 MRN [id number]   [facility] 8NE-A Attending Shana Lesches, MD   Hosp Day # 2 PCP Mamadou Yang MD       SUBJECTIVE:    Feels better to K 4.8 4.3 4.0 4.7    104 112 107   CO2 27.0 25.0 26.0 28.0   BUN 49* 44* 38* 34*   CREATSERUM 2.08* 1.96* 1.59* 1.64*   CA 9.0 8.6 8.5 8.5   * 336* 89 161*       Recent Labs   Lab 05/15/19  1351   ALT 27   AST 17   ALB 3.6       No results f

## 2019-05-18 NOTE — PLAN OF CARE
Problem: Patient/Family Goals  Goal: Patient/Family Long Term Goal  Description  Patient's Long Term Goal: to go home    Interventions:  - resume normal activities  - See additional Care Plan goals for specific interventions   Outcome: Progressing  Goal: optimal ventilation and oxygenation  Description  INTERVENTIONS:  - Assess for changes in respiratory status  - Assess for changes in mentation and behavior  - Position to facilitate oxygenation and minimize respiratory effort  - Oxygen supplementation bas

## 2019-05-19 NOTE — PROGRESS NOTES
[facility] Cardiology Progress Note - Flex Lopez Patient Status:  Inpatient    10/3/1932 MRN [id number]   [facility] 8NE-A Attending Matty Munguia MD   Hosp Day # 3 PCP Lali Li MD     Subjective:  Still oz (73.1 kg)      Tele: NSR    Physical Exam:    General: Alert and oriented x 3. No apparent distress. No respiratory or constitutional distress. HEENT: Normocephalic, anicteric sclera, neck supple, no thyromegaly or adenopathy.   Neck: No JVD, carotids 2 day on Mon Wed Fri   ipratropium-albuterol (DUONEB) nebulizer solution 3 mL 3 mL Nebulization QID   Albuterol Sulfate  (90 Base) MCG/ACT inhaler 2 puff 2 puff Inhalation Q4H PRN   Senna-Docusate Sodium (SENOKOT S) 8.6-50 MG tab 2 tablet 2 tablet Lala Patella

## 2019-05-19 NOTE — PLAN OF CARE
Problem: Patient/Family Goals  Goal: Patient/Family Long Term Goal  Description  Patient's Long Term Goal: to go home    Interventions:  - resume normal activities  - See additional Care Plan goals for specific interventions   Outcome: Progressing  Goal: oxygenation  Description  INTERVENTIONS:  - Assess for changes in respiratory status  - Assess for changes in mentation and behavior  - Position to facilitate oxygenation and minimize respiratory effort  - Oxygen supplementation based on oxygen saturation

## 2019-05-19 NOTE — PLAN OF CARE
Assume pt care around 0730. Pt denies CP, SOB, dizziness, or lightheadedness. Pt c/o STEWART- O2 sats >92% on 3 L O2 at rest, 5 L O2 w/ activity- baseline home O2 needs, expiratory wheezing noted. Dr. Tristan Sees aware-see orders.  Heparin infusing per PE/DVT dagmar baseline  Description  INTERVENTIONS:  - Continuous cardiac monitoring, monitor vital signs, obtain 12 lead EKG if indicated  - Evaluate effectiveness of antiarrhythmic and heart rate control medications as ordered  - Initiate emergency measures for life t

## 2019-05-19 NOTE — PROGRESS NOTES
[facility] Pulmonary/Critical Care Progress Note     SUBJECTIVE/24H Events: All events, procedures, notes reviewed. Feels worse today, more dyspneic with exertion and wheezing. Rare cough. No pain.  No f/c/s      Review  140 142   K 4.0 4.7 4.0    107 108   CO2 26.0 28.0 26.0     Recent Labs   Lab 05/17/19  0436 05/17/19  2100 05/18/19  0502 05/19/19  0538 05/19/19  0539   RBC 3.73* 3.89 3.63* 3.89  --    HGB 11.6* 12.2* 11.4* 12.3*  --    HCT 36.2* 37.6* 34 • Azelastine HCl  2 spray Nasal BID   • azithromycin  250 mg Oral Once per day on Mon Wed Fri   • ipratropium-albuterol  3 mL Nebulization QID   • Umeclidinium Bromide  1 puff Inhalation Daily   • predniSONE  20 mg Oral QOD   • predniSONE  25 mg Oral QOD

## 2019-05-19 NOTE — PROGRESS NOTES
[facility]  Progress Note    Edna Lund Patient Status:  Inpatient    10/3/1932 MRN [id number]   [facility] 8NE-A Attending Gris Fajardo MD   Hosp Day # 3 PCP Kerry Powell MD     Reason for Consultation:  DVT    States he edema;  R shoulder--normal sized, not warm. Neurological: Grossly intact.      Laboratory Data:    Lab Results   Component Value Date    WBC 13.2 05/19/2019    HGB 12.3 05/19/2019    HCT 38.1 05/19/2019    .0 05/19/2019    CREATSERUM 1.63 05/19/2 plan to switch to eliquis tomorrow AM   7. Mild thrombocytopenia and anemia--stable      Plan  1. Discussed with patient. Will switch to eliquis today. Stop heparin drip   2. Eliquis: start 10 mg BID x 7 days and then 5 mg BID  3.  Continue to monitor CBC

## 2019-05-19 NOTE — PROGRESS NOTES
[facility]  Nephrology Progress Note    Corin Ruiz Patient Status:  Inpatient    10/3/1932 MRN [id number]   [facility] 8NE-A Attending Cris Beck MD   Hosp Day # 3 PCP Cassandra Hernandez MD       SUBJECTIVE:  No acute events 05/15/19  1351 05/16/19  1611 05/17/19  0436 05/18/19  0502 05/19/19  0535    138 145 140 142   K 4.8 4.3 4.0 4.7 4.0    104 112 107 108   CO2 27.0 25.0 26.0 28.0 26.0   BUN 49* 44* 38* 34* 38*   CREATSERUM 2.08* 1.96* 1.59* 1.64* 1.63*   CA 9. Rich Luke MD  5/19/2019  8:24 AM

## 2019-05-19 NOTE — PLAN OF CARE
Patient on 3LNC, maintaining good saturations. Tolerates QID tx well. BS diminished bilaterally. Patient is SOB when up with activity. Will continue to monitor.

## 2019-05-19 NOTE — PROGRESS NOTES
[facility] Hospitalist Progress Note                                                                   [address] [facility]  10/3/1932    SUBJECTIVE:  Pt became dyspneic while standing using urinal.  + c Umeclidinium Bromide  1 puff Inhalation Daily   • Fluticasone Propionate  1 spray Each Nare Daily     Continuous Infusions:     PRN: LORazepam, Albuterol Sulfate HFA, Senna-Docusate Sodium, acetaminophen    Assessment/Plan:  Active Problems:    DVT (deep v

## 2019-05-20 NOTE — CM/SW NOTE
MSW paged Campbell Torres called back. Pt has hx with [facility].  Edgar Hightower will check to see if patient is current with [facility].

## 2019-05-20 NOTE — PROGRESS NOTES
[facility]  Progress Note    Freida Kay Patient Status:  Inpatient    10/3/1932 MRN [id number]   [facility] 8NE-A Attending Julio César Limon MD   [facility] Day # 4 PCP Alfrieda Cranker, MD     ASSESSMENT    1.  New extensive LLE DVT, is a(n) 80year old male.    feels better    yellow sputum - less freqquent   Dyspnea is better today    slep well till 4 am   Objective:  Oxygen Therapy  SpO2: 98 %  O2 Device: Nasal cannula  FiO2 (%): 100 %  O2 Flow Rate (L/min): 5 L/min  Pulse Oximetry T --   --   --   --   --    AST 17  --   --   --   --   --    ALT 27  --   --   --   --   --    BILT 0.8  --   --   --   --   --    TP 7.3  --   --   --   --   --      @MG@    Lab Results   Component Value Date    PT 13.1 05/25/2011    INR 0.96 05/16/2019 MG tab 2 tablet 2 tablet Oral BID PRN   Umeclidinium Bromide (INCRUSE ELLIPTA) 62.5 MCG/INH inhaler 1 puff 1 puff Inhalation Daily   acetaminophen (TYLENOL) tab 650 mg 650 mg Oral Q6H PRN   Fluticasone Propionate (FLONASE) 50 MCG/ACT nasal spray 1 spray 1

## 2019-05-20 NOTE — CONSULTS
[facility]  Endocrinology Consultation    Carter Hernandez Patient Status:  Inpatient    10/3/1932 MRN [id number]   [facility] 8NE-A Attending Arianna Del Rosario MD   [facility] Day # 4 PCP Errol Cushing, MD     Reason for Consultation:  [facility] - JOVITA Hedrick   • [facility] [place], [facility]. SPLY LEAD PACEMAKER NON TRANSVENOUS SINGLE  2016    new RV lead placed due to high impedance-Dr. Irlanda Jamison   • PACEMAKER  05/25/2011    [employer]j Pacemaker   • REPAIR ROTATOR CUFF,ACUTE      left       Family History BID  •  azithromycin (ZITHROMAX) tab 250 mg, 250 mg, Oral, Once per day on Mon Wed Fri  •  Albuterol Sulfate  (90 Base) MCG/ACT inhaler 2 puff, 2 puff, Inhalation, Q4H PRN  •  Senna-Docusate Sodium (SENOKOT S) 8.6-50 MG tab 2 tablet, 2 tablet, Oral, 5/20/2019   HEMOGLOBIN A1c      <5.7 % 6.9 (H)     Lab Results   Component Value Date    WBC 13.0 05/20/2019    HGB 11.6 05/20/2019    HCT 35.3 05/20/2019    .0 05/20/2019    CREATSERUM 1.85 05/19/2019    BUN 36 05/19/2019     05/19/2019    K

## 2019-05-20 NOTE — PROGRESS NOTES
[facility]  Nephrology Progress Note    August Dear Attending:  Yonathan Mckeon MD       Assessment and Plan:    1) Acute DVT LLE; chronic DVT RLE- now on eliquis indefinitely as per hematology given recurrent events     2) CKD 3- baseline Cr 05/20/2019       Imaging: All imaging studies reviewed.     Meds:     Current Facility-Administered Medications:  methylPREDNISolone Sodium Succ (Solu-MEDROL) injection 60 mg 60 mg Intravenous Q8H   ipratropium-albuterol (DUONEB) nebulizer solution 3 mL 3

## 2019-05-20 NOTE — PLAN OF CARE
Alert. Oriented. Very pleasant pt. [place]. Richard h.aids. V.paced per tele. No edema noted. Ble up on pillows. O2 in place. Denies pain. Sob w/ exertion. Non-prod cough noted. On eliquis bid. Poc udpated w/ pt. Cont. to monitor pt.     Problem: Patient/Family Goal oxygenation  Description  INTERVENTIONS:  - Assess for changes in respiratory status  - Assess for changes in mentation and behavior  - Position to facilitate oxygenation and minimize respiratory effort  - Oxygen supplementation based on oxygen saturation

## 2019-05-20 NOTE — CM/SW NOTE
Met with patient to discuss discharge planning. Patient shared that he currently is residing with his daughter  Ernestina Farah, her spouse and their [address]year old son in [place]. The patient has a room with a bathroom on the first floor.   Patient no longer drives

## 2019-05-20 NOTE — PLAN OF CARE
Assumed pt care around 0630. Pt denies CP, SOB, dizziness, or lightheadedness. Pt reports that STEWART is improved from yesterday. Pt O2 sats >95% on 5 L O2 w/ activity. Pt up w/ SBA, continent of b/b. Pt education provided on medication and POC.  Pt verbalized arrhythmias  - Monitor electrolytes and administer replacement therapy as ordered  Outcome: Progressing     Problem: RESPIRATORY - ADULT  Goal: Achieves optimal ventilation and oxygenation  Description  INTERVENTIONS:  - Assess for changes in respiratory s

## 2019-05-20 NOTE — PROGRESS NOTES
[facility] Cardiology Progress Note - Gil Merion Patient Status:  Inpatient    10/3/1932 MRN [id number]   [facility] 8NE-A Attending Yonathan Mckeon MD   [facility] Day # 4 PCP Anthony Carver MD     Subjective:  Still cardiac sounds. Lungs: Clear without wheezes, rales, rhonchi or dullness. Normal excursions and effort. Abdomen: Soft, non-tender. No organosplenomegally, mass or rebound, BS-present. Extremities: Without clubbing, cyanosis or edema.   Peripheral pulses Intravenous Q15 Min PRN   Or      glucose (DEX4) oral liquid 30 g 30 g Oral Q15 Min PRN   Or      Glucose-Vitamin C (DEX-4) 4-6 GM-MG chewable tab 8 tablet 8 tablet Oral Q15 Min PRN   Insulin Aspart Pen (NOVOLOG) 100 UNIT/ML flexpen 1-40 Units 1-40 Units S

## 2019-05-20 NOTE — PROGRESS NOTES
[facility] Hospitalist Progress Note                                                                   [address] [facility]  10/3/1932    SUBJECTIVE:    Breathing is overall improving. Slept better. Nightly   • furosemide  40 mg Oral Daily   • famoTIDine  20 mg Oral Daily   • Azelastine HCl  2 spray Nasal BID   • azithromycin  250 mg Oral Once per day on Mon Wed Fri   • Umeclidinium Bromide  1 puff Inhalation Daily   • Fluticasone Propionate  1 spray

## 2019-05-21 NOTE — CM/SW NOTE
Per request of patient's daughter, provided patient with medicare print out with phone numbers on medicare part D prescription drug coverage  And frequently asked questions

## 2019-05-21 NOTE — PLAN OF CARE
Assumed pt care at 0730. Pt sitting up in the bed. No s/s of acute distress noted at this time. VSS. Pt denies any pain at this time. Alert and oriented X 4. Pt is on 5L/NC, has to be titrated up to 6-7 with activity.  No new issues overni difficulty  - Respiratory Therapy support as indicated  - Manage/alleviate anxiety  - Monitor for signs/symptoms of CO2 retention  Outcome: Progressing     Problem: PAIN - ADULT  Goal: Verbalizes/displays adequate comfort level or patient's stated pain goa physical deficits and behaviors that affect risk of falls.   - [place] fall precautions as indicated by assessment.  - Educate pt/family on patient safety including physical limitations  - Instruct pt to call for assistance with activity based on assessme

## 2019-05-21 NOTE — PROGRESS NOTES
[facility]  Nephrology Progress Note    Krishna Person Attending:  Hyun Street MD       Assessment and Plan:    1) Acute DVT LLE; chronic DVT RLE- now on eliquis indefinitely as per hematology given recurrent events     2) CKD 3- baseline Cr (Solu-MEDROL) injection 40 mg 40 mg Intravenous Q8H   apixaban (ELIQUIS) tab 10 mg 10 mg Oral BID   Budesonide-Formoterol Fumarate (SYMBICORT) 160-4.5 MCG/ACT inhaler 2 puff 2 puff Inhalation 2 times daily   glucose (DEX4) oral liquid 15 g 15 g Oral Q15 Mi

## 2019-05-21 NOTE — PROGRESS NOTES
[facility]  Progress Note    Dean Tate Patient Status:  Inpatient    10/3/1932 MRN [id number]   [facility] 8NE-A Attending Brittany Howard MD   Hosp Day # 5 PCP Everton Pelaez MD     ASSESSMENT     1. New extensive LLE DVT, Acute diastolic heart failure (HCC)      Subjective:  Lizzy Doherty is a(n) 80year old male.    not better    did not had a good night with some difficyulties breathing   More proiductive cough , more sputum , thick   no pain    weak   Objective:  Oxygen 27.0 25.0 26.0 28.0 26.0 26.0   ALKPHO 69  --   --   --   --   --    AST 17  --   --   --   --   --    ALT 27  --   --   --   --   --    BILT 0.8  --   --   --   --   --    TP 7.3  --   --   --   --   --      @MG@    Lab Results   Component Value Date    P PRN   Senna-Docusate Sodium (SENOKOT S) 8.6-50 MG tab 2 tablet 2 tablet Oral BID PRN   Umeclidinium Bromide (INCRUSE ELLIPTA) 62.5 MCG/INH inhaler 1 puff 1 puff Inhalation Daily   acetaminophen (TYLENOL) tab 650 mg 650 mg Oral Q6H PRN   Fluticasone Propion

## 2019-05-21 NOTE — PROGRESS NOTES
[facility]  Progress Note    August Dear Patient Status:  Inpatient    10/3/1932 MRN [id number]   [facility] 8NE-A Attending Yonathan Mckeon MD   [facility] Day # 5 PCP Anthony Carver MD     Subjective:    SOB continues       Corina Frausto ASSESSMENT AND PLAN:       Rem Settler is a 80year old male with t    1. New extensive LLE DVT  2. 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   Also an episode of venipuncture related thrombophle

## 2019-05-21 NOTE — HOME CARE LIAISON
Referral from [facility]. Met with patient to offer home health. Patient agreeable to [facility] for RN/PT services and resume Palliative Care upon discharge. Brochure and contact information given to patient for any further questions/concerns.   Thanks for t

## 2019-05-21 NOTE — PROGRESS NOTES
[facility]  Endocrinology Progress Note    [place] Patient Status:  Inpatient    10/3/1932 MRN [id number]   [facility] 8NE-A Attending Fabio Holley MD   [address] Day # 5 PCP Karina Hyatt MD     CC: LLE DVT      Subjective Propionate  1 spray Each Nare Daily       Labs  Reviewed in Epic    Component      Latest Ref Rng & Units 5/20/2019   HEMOGLOBIN A1c      <5.7 % 6.9 (H)       Lab Results   Component Value Date    PGLU 206 05/21/2019       Recent Labs     05/19/19  1806 05

## 2019-05-21 NOTE — PROGRESS NOTES
[facility] Hospitalist Progress Note                                                                   [address] [facility]  10/3/1932    SUBJECTIVE:    Again breathing worsening today.   Given IV lasix, Inhalation 2 times daily   • Insulin Aspart Pen  1-40 Units Subcutaneous TID AC and HS   • insulin detemir  12 Units Subcutaneous Nightly   • famoTIDine  20 mg Oral Daily   • Azelastine HCl  2 spray Nasal BID   • azithromycin  250 mg Oral Once per day on M

## 2019-05-22 NOTE — RESPIRATORY THERAPY NOTE
Patient states he is having a hard time breathing. He is on 5LPM NC. Scattered wheezes bilaterally. Patient was jarad to produce small amount of yellow, thick, sputum. Continue to monitor. Continue Duoneb and Mucomyst as ordered.

## 2019-05-22 NOTE — PROGRESS NOTES
[facility]  Progress Note    Carter Hernandez Patient Status:  Inpatient    10/3/1932 MRN [id number]   [facility] 8NE-A Attending Arianna Del Rosario MD   [facility] Day # 6 PCP Errol Cushing, MD     ASSESSMENT     1. New extensive LLE DVT, Hemarthrosis involving glenohumeral joint, left     Acute pain of right shoulder     DVT (deep venous thrombosis) (HCC)     Acute diastolic heart failure (HCC)      Subjective:  Jackie Hardin is a(n) 80year old male.    better   ambulates with dyspnea and 28.0 26.0 26.0 28.0   ALKPHO  --   --   --   --  59   AST  --   --   --   --  19   ALT  --   --   --   --  25   BILT  --   --   --   --  0.6   TP  --   --   --   --  6.1*     @MG@    Lab Results   Component Value Date    PT 13.1 05/25/2011    INR 0.96 05/1 spray 2 spray Nasal BID   azithromycin (ZITHROMAX) tab 250 mg 250 mg Oral Once per day on Mon Wed Fri   Albuterol Sulfate  (90 Base) MCG/ACT inhaler 2 puff 2 puff Inhalation Q4H PRN   Senna-Docusate Sodium (SENOKOT S) 8.6-50 MG tab 2 tablet 2 tablet

## 2019-05-22 NOTE — DIETARY NOTE
C/ Joan Palma 88     Admitting diagnosis:  DVT  DVT (deep venous thrombosis) (HCC)    Ht:  5'6\"  Wt: 71.3 kg (157 lb 3 oz). This is 111 % of IBW  Body mass index is 25.14 kg/m².   IBW: 64.5 kg    Labs/Meds reviewed

## 2019-05-22 NOTE — PLAN OF CARE
Assumed pt care at 0730. Pt in bed resting quietly. No s/s of acute distress noted at this time. VSS. Alert and oriented X 4. Pt is on 5L/NC. V paced on tele monitor. Pt denies pain at this time. No new issues overnight.  Pt states that his b instruct to report SOB or any respiratory difficulty  - Respiratory Therapy support as indicated  - Manage/alleviate anxiety  - Monitor for signs/symptoms of CO2 retention  Outcome: Progressing     Problem: PAIN - ADULT  Goal: Verbalizes/displays adequate resources  Description  INTERVENTIONS:  - Identify barriers to discharge w/pt and caregiver  - Include patient/family/discharge partner in discharge planning  - Arrange for needed discharge resources and transportation as appropriate  - Identify discharge

## 2019-05-22 NOTE — PROGRESS NOTES
[facility]  Endocrinology Progress Note    Krishna Person Patient Status:  Inpatient    10/3/1932 MRN [id number]   [facility] 8NE-A Attending Hyun Street MD   [facility] Day # 6 PCP Adal Topete MD     CC: LLE DVT      Subjective Umeclidinium Bromide  1 puff Inhalation Daily   • Fluticasone Propionate  1 spray Each Nare Daily       Labs  Reviewed in Epic    Component      Latest Ref Rng & Units 5/20/2019   HEMOGLOBIN A1c      <5.7 % 6.9 (H)       Lab Results   Component Value Date Diabetes, and Metabolism  [facility]

## 2019-05-22 NOTE — PLAN OF CARE
Alert. Oriented. V,paced per tele. Still sl sob w/ exertion tonight. Occ wheezing noted. Ble up on several pillows. Voiding adequately. Poc updated w/ pt. Cont. to monitor pt.

## 2019-05-22 NOTE — PROGRESS NOTES
[facility] Hospitalist Progress Note                                                                   [address] [facility]  10/3/1932    SUBJECTIVE:      No acute events.   Feels like breathing is improv Nebulization TID   • MethylPREDNISolone Sodium Succ  60 mg Intravenous Q8H   • guaiFENesin ER  600 mg Oral BID   • Insulin Aspart Pen  1-68 Units Subcutaneous TID CC   • apixaban  10 mg Oral BID   • Budesonide-Formoterol Fumarate  2 puff Inhalation 2 times

## 2019-05-22 NOTE — RESPIRATORY THERAPY NOTE
Received Pt on 4 lpm/NC. Nebulizer treatments administered per orders. Pt tolerating well. Breath sounds diminished bilaterally with intermittent wheezing. Will continue to monitor.

## 2019-05-23 NOTE — PROGRESS NOTES
[facility]  Endocrinology Progress Note    [place] Patient Status:  Inpatient    10/3/1932 MRN [id number]   [facility] [place] 8NE-A Attending Fabio Holley MD   [address] Day # 7 PCP Karina Hyatt MD     CC: LLE DVT      Subjective Inhalation Daily   • Fluticasone Propionate  1 spray Each Nare Daily       Labs  Reviewed in Epic    Component      Latest Ref Rng & Units 5/20/2019   HEMOGLOBIN A1c      <5.7 % 6.9 (H)       Lab Results   Component Value Date    CREATSERUM 1.96 05/23/2019 Metabolism  [facility]

## 2019-05-23 NOTE — PROGRESS NOTES
[facility]  Progress Note    Dallas Lebron Patient Status:  Inpatient    10/3/1932 MRN [id number]   [facility] 8NE-A Attending Michael Adams, DO   Hosp Day # 7 PCP Karina Hyatt MD     Subjective:  Dallas Lebron is a(n) 80year old ma (chronic obstructive pulmonary disease) (HCC)     AV block, 2nd degree     Pacemaker St.Jj-DEP     CKD (chronic kidney disease) stage 3, GFR 30-59 ml/min ([facility])     Hypertension     Notalgia paresthetica     Pleural effusion     Syncopal episodes     OBIE

## 2019-05-23 NOTE — PROGRESS NOTES
[facility] Hospitalist Progress Note     PCP: Kerry Powell MD    SUBJECTIVE:  No CP. Pt feels well but still has conversational dyspnea and STEWART, worse than his baseline. On 5L NC at rest    OBJECTIVE:  Temp:  [97.7 °F (36.5 °C)-98.9 °F (37.2 °C)] 97.7 °F (36. CO2 26.0 26.0 28.0 23.0 29.0   BUN 38* 36* 59* 64* 70*   CREATSERUM 1.63* 1.85* 1.87* 2.02* 1.96*   CA 9.0 9.0 9.2 8.7 8.6   MG  --   --   --  2.7*  --    PHOS  --   --   --  5.0*  --    GLU 83 474* 83 209* 144*       Recent Labs   Lab 05/21/19  0602   A increased pulm HTN compared to 11/2018  -  given one time dose IV steroids 5/17 and then restarted 5/18               - Cont IV steroids per pulm  - Continue 02, wean as able - was on 6L with exertion and 3-4L at rest at home to maintain O2 sat > 89%  - at

## 2019-05-23 NOTE — PROGRESS NOTES
[facility]  Nephrology Progress Note    Valeria Cuellar Attending:  Anila Whitaker MD       Assessment and Plan:    1) Acute DVT LLE; chronic DVT RLE- now on eliquis indefinitely as per hematology given recurrent events     2) CKD 3- baseline Cr 05/23/2019    CO2 29.0 05/23/2019     05/23/2019    CA 8.6 05/23/2019    PGLU 189 05/23/2019       Imaging: All imaging studies reviewed.     Meds:     Current Facility-Administered Medications:  ipratropium-albuterol (DUONEB) nebulizer solution 3 m Fluticasone Propionate (FLONASE) 50 MCG/ACT nasal spray 1 spray 1 spray Each Nare Daily         Questions/concerns were discussed with patient and/or family by bedside.           Samantha Raymond  5/23/2019  1106 AM

## 2019-05-23 NOTE — PLAN OF CARE
Received patient at 0730. Alert and Oriented x4. Tele Rhythm Vpaced. O2 saturation 96% On 4L NC, wears 6-7 with activity. Breath sounds diminished, SOB noted with exertion. Bed is locked and in low position. Call light and personal items within reach.  Skin control medications as ordered  - Initiate emergency measures for life threatening arrhythmias  - Monitor electrolytes and administer replacement therapy as ordered  Outcome: Progressing     Problem: RESPIRATORY - ADULT  Goal: Achieves optimal ventilation SAFETY ADULT - FALL  Goal: Free from fall injury  Description  INTERVENTIONS:  - Assess pt frequently for physical needs  - Identify cognitive and physical deficits and behaviors that affect risk of falls.   - [place] fall precautions as indicated by asse

## 2019-05-23 NOTE — PLAN OF CARE
Patient alert and oriented. V paced on tele. SpO2 98% on 5L of O2. Patient denies any pain. SOB on exertion. Call light is within reach, will continue to monitor.      Problem: Patient/Family Goals  Goal: Patient/Family Long Term Goal  Description  Pa in respiratory status  - Assess for changes in mentation and behavior  - Position to facilitate oxygenation and minimize respiratory effort  - Oxygen supplementation based on oxygen saturation or ABGs  - Provide Smoking Cessation handout, if applicable  - limitations  - Instruct pt to call for assistance with activity based on assessment  - Modify environment to reduce risk of injury  - Provide assistive devices as appropriate  - Consider OT/PT consult to assist with strengthening/mobility  - Encourage toil

## 2019-05-24 NOTE — PLAN OF CARE
Pt is alert and oriented , on 4L of O2 at rest and 6L of O2 with ambulation. Pt is V-paced on the monitor, pt denies any cardio vascular symptoms. Pt is continent of B/B. Up with SBA. Qid acc. Pt has generalized bruising, skin is prone to tears.      Plan: replacement therapy as ordered  Outcome: Progressing     Problem: RESPIRATORY - ADULT  Goal: Achieves optimal ventilation and oxygenation  Description  INTERVENTIONS:  - Assess for changes in respiratory status  - Assess for changes in mentation and behavi

## 2019-05-24 NOTE — PROGRESS NOTES
[facility]  Progress Note    Marilee Mormonism Patient Status:  Inpatient    10/3/1932 MRN [id number]   [facility] 8NE-A Attending Malvin Noriega MD   [facility] Day # 8 PCP Leighton Santana MD     ASSESSMENT     1. New extensive LLE DVT, Probe Site Changed: Yes  Pulse Ox Probe Location: Right hand  Blood pressure 145/78, pulse 83, temperature 97.5 °F (36.4 °C), temperature source Oral, resp. rate 18, weight 155 lb 13.8 oz (70.7 kg), SpO2 99 %.     Intake/Output:    Intake/Output Summary (La 23.0 29.0 32.0   ALKPHO  --  59  --   --   --    AST  --  19  --   --   --    ALT  --  25  --   --   --    BILT  --  0.6  --   --   --    TP  --  6.1*  --   --   --      @MG@    Lab Results   Component Value Date    PT 13.1 05/25/2011    INR 0.96 05/16/201 0.5 mg 0.5 mg Oral Q6H PRN   Azelastine HCl (ASTELIN) 0.1 % nasal spray 2 spray 2 spray Nasal BID   azithromycin (ZITHROMAX) tab 250 mg 250 mg Oral Once per day on Mon Wed Fri   Albuterol Sulfate  (90 Base) MCG/ACT inhaler 2 puff 2 puff Inhalation Q

## 2019-05-24 NOTE — PROGRESS NOTES
I was asked to provide some supplies by the patient's RN. She stated that he may be going home on insulin, probably Levemir.  Based on his age, A1C of 6.9 and his fluctuating blood glucoses, I showed the graph of his glucose levels to Dr. Azul Chan

## 2019-05-24 NOTE — PROGRESS NOTES
[facility]  Nephrology Progress Note    [place] Attending:  Fabio Holley MD       Assessment and Plan:    1) Acute DVT LLE; chronic DVT RLE- now on eliquis indefinitely as per hematology given recurrent events     2) CKD 3- baseline Cr 103 05/24/2019    CO2 32.0 05/24/2019    GLU 80 05/24/2019    CA 8.6 05/24/2019    PGLU 381 05/23/2019       Imaging: All imaging studies reviewed.     Meds:     Current Facility-Administered Medications:  ipratropium-albuterol (DUONEB) nebulizer solution (FLONASE) 50 MCG/ACT nasal spray 1 spray 1 spray Each Nare Daily         Questions/concerns were discussed with patient and/or family by bedside.           Samantha Raymond  5/24/2019  745 AM

## 2019-05-24 NOTE — PROGRESS NOTES
[facility]  Progress Note    Sage Frankel Patient Status:  Inpatient    10/3/1932 MRN [id number]   [facility] 8NE-A Attending Sloan Montiel DO   Hosp Day # 8 PCP Phil Patel MD       Assessment/Plan:80year old man admitted 19 05/23/19 12:44 PM   Result Value Ref Range    POC Glucose 141 (H) 70 - 99 mg/dL   POCT GLUCOSE    Collection Time: 05/23/19  4:57 PM   Result Value Ref Range    POC Glucose 195 (H) 70 - 99 mg/dL   POCT GLUCOSE    Collection Time: 05/23/19  9:34 PM   Result

## 2019-05-24 NOTE — PLAN OF CARE
Received patient at 0730. Alert and Oriented x4. Tele Rhythm Vpaced. O2 saturation 98% On 4L NC, weaned to 3L which is his home resting amount and is 94%. Wears 6-7 L with activity. Breath sounds diminished, SOB noted with exertion.  Bed is locked and in lo indicated  - Evaluate effectiveness of antiarrhythmic and heart rate control medications as ordered  - Initiate emergency measures for life threatening arrhythmias  - Monitor electrolytes and administer replacement therapy as ordered  Outcome: Progressing ordered  - Implement neutropenic guidelines  Outcome: Progressing     Problem: SAFETY ADULT - FALL  Goal: Free from fall injury  Description  INTERVENTIONS:  - Assess pt frequently for physical needs  - Identify cognitive and physical deficits and behavior nutrition consult as needed  - Instruct patient on self management of diabetes  Outcome: Progressing

## 2019-05-24 NOTE — PROGRESS NOTES
[facility] Hospitalist Progress Note     PCP: Jose Singletary MD    SUBJECTIVE:  No CP, SOB, or N/V. Pt is feeling some improvement in his breathing today but does note he's just weaker than his baseline.   RN has been trying to get him up this morning to walk but 23.0 29.0 32.0   BUN 36* 59* 64* 70* 71*   CREATSERUM 1.85* 1.87* 2.02* 1.96* 1.73*   CA 9.0 9.2 8.7 8.6 8.6   MG  --   --  2.7*  --   --    PHOS  --   --  5.0*  --   --    * 83 209* 144* 80       Recent Labs   Lab 05/21/19  0602   ALT 25   AST 19 failure, aecopd with asbestos related lung disease; increased pulm HTN compared to 11/2018  Nikole Fry one time dose IV steroids 5/17 and then restarted 5/18               - now weaned to prednisone 30mg BID per pulm  - Continue 02, wean as able - was on 6L

## 2019-05-25 NOTE — PROGRESS NOTES
[facility]  Progress Note    Shobha Chao Patient Status:  Inpatient    10/3/1932 MRN [id number]   [facility] 8NE-A Attending Daniel Hassan MD   [facility] Day # 9 PCP Florence Bhakta MD     ASSESSMENT     1. New extensive LLE DVT, SpO2: 97% 95% 99% 97%   Weight:  155 lb 10.3 oz (70.6 kg)       4L    Oxygen Therapy  SpO2: 97 %  O2 Device: Nasal cannula  FiO2 (%): 100 %  O2 Flow Rate (L/min): 4 L/min  Pulse Oximetry Type: Continuous  Oximetry Probe Site Changed: Yes  Pulse Ox Probe CREATSERUM 1.87* 2.02* 1.96* 1.73* 1.74*   GFRAA 37* 34* 35* 40* 40*   GFRNAA 32* 29* 30* 35* 35*   CA 9.2 8.7 8.6 8.6 8.8   ALB 2.8*  --   --   --   --     138 139 142 141   K 4.5 4.5 4.1 3.8 4.7    103 102 103 103   CO2 28.0 23.0 29.0 32.0 Subcutaneous Nightly   famoTIDine (PEPCID) tab 20 mg 20 mg Oral Daily   LORazepam (ATIVAN) tab 0.5 mg 0.5 mg Oral Q6H PRN   Azelastine HCl (ASTELIN) 0.1 % nasal spray 2 spray 2 spray Nasal BID   azithromycin (ZITHROMAX) tab 250 mg 250 mg Oral Once per day

## 2019-05-25 NOTE — PLAN OF CARE
PT is alert and oriented x4, on 3L of O2, 6L of O2 with ambulation. Pt is V paced, denies any cardiovascular symptoms. PT is prone for skin tears. Pt is continent of B/B, yp with SBA. Plan: IV lasix  40mg, Po Prednisone 30mg. Eliquis.      POC: plan of Initiate emergency measures for life threatening arrhythmias  - Monitor electrolytes and administer replacement therapy as ordered  Outcome: Progressing     Problem: RESPIRATORY - ADULT  Goal: Achieves optimal ventilation and oxygenation  Description  INTE

## 2019-05-25 NOTE — PLAN OF CARE
Problem: Patient/Family Goals  Goal: Patient/Family Long Term Goal  Description  Patient's Long Term Goal: to go home    Interventions:  - resume normal activities  - See additional Care Plan goals for specific interventions   Outcome: Progressing  Goal: on oxygen saturation or ABGs  - Provide Smoking Cessation handout, if applicable  - Encourage broncho-pulmonary hygiene including cough, deep breathe, Incentive Spirometry  - Assess the need for suctioning and perform as needed  - Assess and instruct to re appropriate  - Consider OT/PT consult to assist with strengthening/mobility  - Encourage toileting schedule  Outcome: Progressing     Problem: DISCHARGE PLANNING  Goal: Discharge to home or other facility with appropriate resources  Description  INTERVENTI

## 2019-05-25 NOTE — PROGRESS NOTES
[facility] Hospitalist Progress Note     PCP: Anny Abraham MD    SUBJECTIVE:  No CP or N/V.   Still sob    OBJECTIVE:  Temp:  [97.8 °F (36.6 °C)-98.8 °F (37.1 °C)] 98.5 °F (36.9 °C)  Pulse:  [85-96] 96  Resp:  [16-20] 18  BP: (107-121)/(66-79) 121/74    Intake/O 80 104*       Recent Labs   Lab 05/21/19  0602   ALT 25   AST 19   ALB 2.8*       Recent Labs   Lab 05/24/19  0748 05/24/19  1216 05/24/19  1643 05/24/19  2112 05/25/19  0705   PGLU 154* 75 151* 258* 104*       No results for input(s): TROP in the last 168 improvement later, may need to transition back to IV steroids  - Continue 02, wean as able - was on 6L with exertion and 3-4L at rest at home to maintain O2 sat > 89%  - attempt at sputum cx x 2 - contaminated  - cont NOAC.  VQ low prob but could have smal

## 2019-05-25 NOTE — PROGRESS NOTES
[facility]  Nephrology Progress Note    Dada Isaacs Attending:  Antonina Cruz MD       Assessment and Plan:    1) Acute DVT LLE; chronic DVT RLE- now on eliquis indefinitely as per hematology given recurrent events     2) CKD 3- baseline Cr 05/25/2019    CO2 29.0 05/25/2019     05/25/2019    CA 8.8 05/25/2019    PGLU 104 05/25/2019       Imaging: All imaging studies reviewed.     Meds:     Current Facility-Administered Medications:  ipratropium-albuterol (DUONEB) nebulizer solution 3 m Questions/concerns were discussed with patient and/or family by bedside.           Samantha Raymond  5/25/2019  857 AM

## 2019-05-25 NOTE — PROGRESS NOTES
[facility]  Progress Note    Luis Denver Patient Status:  Inpatient    10/3/1932 MRN [id number]   [facility] 8NE-A Attending Leatha Brady, DO   Hosp Day # 9 PCP Maritza Mayorga MD       Assessment/Plan:80year old man admitted 19 POCT GLUCOSE    Collection Time: 05/24/19  4:43 PM   Result Value Ref Range    POC Glucose 151 (H) 70 - 99 mg/dL   POCT GLUCOSE    Collection Time: 05/24/19  9:12 PM   Result Value Ref Range    POC Glucose 258 (H) 70 - 99 mg/dL   BASIC METABOLIC PANEL (8

## 2019-05-26 NOTE — PLAN OF CARE
Per pt breathing better today  4l O2 at rest and 6L O2 with acitivity sats 97%, pt baseline  Denies chest pain, no edema   On Eliquis for left leg dvt, pt ready for home  D/c today,daughter here to  pt.     Problem: CARDIOVASCULAR - ADULT  Goal: Main signs/symptoms of CO2 retention  Outcome: Progressing     Problem: Diabetes/Glucose Control  Goal: Glucose maintained within prescribed range  Description  INTERVENTIONS:  - Monitor Blood Glucose as ordered  - Assess for signs and symptoms of hyperglycemia

## 2019-05-26 NOTE — PROGRESS NOTES
[facility]  Progress Note    Darryle Hoyer Patient Status:  Inpatient    10/3/1932 MRN [id number]   [facility] 8NE-A Attending Juan Molina MD   Hosp Day # 10 PCP Katherine Hernandez MD     ASSESSMENT     1. New extensive LLE DVT hand  Blood pressure 120/66, pulse 102, temperature 98 °F (36.7 °C), temperature source Oral, resp. rate 18, weight 158 lb 4.6 oz (71.8 kg), SpO2 96 %.     Intake/Output:    Intake/Output Summary (Last 24 hours) at 5/26/2019 0913  Last data filed at 5/26/20 TP 6.1*  --   --   --   --   --      @MG@    Lab Results   Component Value Date    PT 13.1 05/25/2011    INR 0.96 05/16/2019    INR 0.97 11/12/2018    INR 0.99 11/05/2018        No results for input(s): TSH in the last 72 hours.     No results for input(s Sulfate  (90 Base) MCG/ACT inhaler 2 puff 2 puff Inhalation Q4H PRN   Senna-Docusate Sodium (SENOKOT S) 8.6-50 MG tab 2 tablet 2 tablet Oral BID PRN   Umeclidinium Bromide (INCRUSE ELLIPTA) 62.5 MCG/INH inhaler 1 puff 1 puff Inhalation Daily   aceta

## 2019-05-26 NOTE — PLAN OF CARE
Assumed care of pt at 1930 a/o x3 with family bedside. He took a walk with family around 2000 and needed to stop 3x to catch his breath on 6L per nc. He used a walker and did well otherwise.   He has home O2 and is on baseline amount and lungs still coars Absence of cardiac arrhythmias or at baseline  Description  INTERVENTIONS:  - Continuous cardiac monitoring, monitor vital signs, obtain 12 lead EKG if indicated  - Evaluate effectiveness of antiarrhythmic and heart rate control medications as ordered  - I and caregiver  - Include patient/family/discharge partner in discharge planning  - Arrange for needed discharge resources and transportation as appropriate  - Identify discharge learning needs (meds, wound care, etc)  - Arrange for interpreters to assist a

## 2019-05-26 NOTE — PROGRESS NOTES
[facility]  Progress Note    Freida Kay Patient Status:  Inpatient    10/3/1932 MRN [id number]   [facility] 8NE-A Attending Surjit Perez,    Hosp Day # 10 PCP Alfrieda Cranker, MD       Assessment/Plan:80year old man admitted  12:46 PM   Result Value Ref Range    POC Glucose 64 (L) 70 - 99 mg/dL   POCT GLUCOSE    Collection Time: 05/25/19 12:49 PM   Result Value Ref Range    POC Glucose 70 70 - 99 mg/dL   POCT GLUCOSE    Collection Time: 05/25/19  5:48 PM   Result Value Ref Napoleon Schultz

## 2019-05-26 NOTE — PROGRESS NOTES
[facility]  Nephrology Progress Note    Jackie Hardin Attending:  Monet Pabon MD       Assessment and Plan:    1) Acute DVT LLE; chronic DVT RLE- now on eliquis indefinitely as per hematology given recurrent events     2) CKD 3- baseline Cr 05/26/2019    CO2 30.0 05/26/2019    GLU 99 05/26/2019    CA 8.6 05/26/2019    PGLU 93 05/26/2019       Imaging: All imaging studies reviewed.     Meds:     Current Facility-Administered Medications:  furosemide (LASIX) tab 40 mg 40 mg Oral BID (Diuretic) Propionate (FLONASE) 50 MCG/ACT nasal spray 1 spray 1 spray Each Nare Daily         Questions/concerns were discussed with patient and/or family by bedside.           Samantha Raymond  5/26/2019  950 AM

## 2019-05-26 NOTE — DISCHARGE SUMMARY
General Medicine Discharge Summary     Patient ID:  Staci Owen  80year old  10/3/1932    Admit date: 5/16/2019    Discharge date and time: 5/26/19    Attending Physician: DO Thomas Griffin Grant Real as outpt  - Continue 02, wean as able - was on 6L with exertion and 3-4L at rest at home to maintain O2 sat > 89%  - attempt at sputum cx x 2 - contaminated  - cont NOAC.  VQ low prob but could have small PEs  - trial of mucomyst 5/21     # GERD  - C femoral, deep femoral, popliteal,   gastrocnemius, posterior tibial and peroneal veins. ---------------------------------------------------------------------------- CRITICAL FINDINGS A result from an emergently ordered study, deep vein thrombosis, was repo ! !femoral           !                  !          !spontaneous; normal      ! !                  !                  !          !augmentation             ! +------------------+------------------+----------+-------------------------+ ! Right superficial !Pat Hassan !Patent            ! None      ! Normal augmentation;     ! !                  !                  !          !compressible             ! +------------------+------------------+----------+-------------------------+ ! Right soleal      !Patent            ! None !Acute     ! Noncompressible          ! !femoral proximal  !                  !          !                         ! +------------------+------------------+----------+-------------------------+ ! Left superficial  !Totally occluded  ! Acute     ! Absent; absen +------------------+------------------+----------+-------------------------+ ! Left peroneal     !Totally occluded  ! Acute     ! Absent; absent           ! !                  !                  !          !spontaneity; no          ! !                  ! Electronically signed by: Anat Grubbs [id number]     Xr Chest Ap Portable  (cpt=71045)    Result Date: 5/22/2019  PROCEDURE:  XR CHEST AP PORTABLE  (CPT=71045)  TECHNIQUE:  AP chest radiograph was obtained.   COMPARISON:  [facility] , XR CHEST AP POR (CPT=71045), 5/19/2019, 14:08. INDICATIONS:  dyspnea     FINDINGS:  Slight decrease in patchy airspace disease at the right lung base consistent with pneumonia. There is stable patchy airspace disease which is very mild at the left lung base.   There is C STATED HISTORY: (As transcribed by Technologist)  Pt. with COPD , difficulty breathing. FINDINGS:  Left chest pacemaker. Cardiomegaly. Stable prominence of the central pulmonary vascularity. Stable COPD changes.   Stable interstitial and airspace opac NEEDLE SELENA U/F) 32G X 4 MM Does not apply Misc  Use one pen needle for each insulin injection, 2x day      CONTINUE these medications which have CHANGED    predniSONE 10 MG Oral Tab  Take 3 tablets twice a day until you follow up with Dr. Marah mayers daily.            Code Status: DNR      Exam on day of discharge:      05/26/19  0815   BP: 120/66   Pulse: 102   Resp: 18   Temp: 98 °F (36.7 °C)       Alert oriented.        Total time coordinating care for discharge: Greater than 30 minutes    Ortiz Kilgore

## 2019-05-26 NOTE — PLAN OF CARE
NURSING DISCHARGE NOTE    Discharged Home via Wheelchair. Accompanied by Support staff  Belongings Taken by patient/family     .

## 2019-05-31 NOTE — PROGRESS NOTES
Shobha Chao  : 10/3/1932  Age 80year old  male patient is admitted to Facility: The [address] [place] [address] at [facility] for [employer] and medical management.     [facility] Admit date:    Discharge date to [facility]:  19  ELOS:   14 days  Anticipated disch disease) ([facility]    • COPD (chronic obstructive pulmonary disease) ([facility]     Management:  sleeps with oxygen 2 L via NC at night   • Deep vein thrombosis (HCC)    • Esophageal reflux    • Hearing impairment     alfonso hearing aides   • Hemorrhoids    • Lung dis mouth 2 (two) times daily.  Disp: 60 tablet Rfl: 0   Glucose Blood (ACCU-CHEK GUIDE) In Vitro Strip Use one test strip for each blood glucose check, 2 x a day Disp: 100 strip Rfl: 1   furosemide 40 MG Oral Tab Take 1 tablet (40 mg total) by mouth BID (Angelicaure Rfl:    Misc Natural Products (GLUCOS-CHONDROIT-MSM COMPLEX OR) Take 1 tablet by mouth daily. Disp:  Rfl:    ipratropium-albuterol (DUONEB) 0.5-2.5 (3) MG/3ML Inhalation Solution Take 3 mL by nebulization 4 (four) times daily.    Disp:  Rfl: 1       VITAL guarding, no rebound tenderness.   :Deferred  LYMPHATIC:no lymphedema  MUSCULOSKELETAL: no acute synovitis upper or lower extremity  EXTREMITIES/VASCULAR:radial pulses 2+ and dorsalis pedal pulses 2+  NEUROLOGIC:A&OX3, no focal deficits, follow commands follow with Dr Gillian Mendez  - continue home meds  - Symbicort BID 2 puffs  - incruse daily  - Proair prn q4   - astelin and flonase nasal  - azithromycin 250 mg PO daily on MWF for chronic suppression  - prednisone taper 20 mg PO BID x5 days, then 15 mg BID x5

## 2019-06-01 NOTE — PROGRESS NOTES
Gema Ochoa Author: Sindy Hutson MD     10/3/1932 MRN [id number]   [facility]  Admission 19      Last Hospital Discharge 19,  PCP Ophelia Molina MD   Hospital of Discharge  [facility]                 Patient admitt

## 2019-06-01 NOTE — PROGRESS NOTES
Giorgio Emmanuel Author: Fran Dotson MD     10/3/1932 MRN [id number]   [facility]  Admission 19      Last Hospital Discharge 19/     2019 PCP Waylon Aleman MD   Hospital of Discharge  [facility]        CC -frequent falls  COPD exace Past Surgical History:   Procedure Laterality Date   • ANGIOGRAM  11/17/2017    normal coronaries, normal right heart pressures   • CATARACT     • CATARACT EXTRACTION Right 08/02/2018    [facility] Dr Chava Mendez   • CATARACT EXTRACTION Left 08/1 Rfl: 1   azithromycin 250 MG Oral Tab Take 250 mg by mouth 3 (three) times a week. Disp:  Rfl: 4   Azelastine HCl 0.1 % Nasal Solution 2 sprays by Nasal route 2 (two) times daily.  Disp: 30 mL Rfl: 5   Senna-Docusate Sodium 8.6-50 MG Oral Tab Take 2 tablets dizziness. Musculoskeletal: No joint pain, stiffness or swelling. GI: No nausea, vomiting or diarrhea. No blood in stools.   Neurologic: No seizures, tremors, weakness or numbness    VITALS: Blood pressure 110/70, pulse of 82, temperature 97.4, respiratio signed

## 2019-06-03 NOTE — PROGRESS NOTES
Krishna [place], 83/1932, 80year old, male    Chief Complaint:  Patient presents with:   Follow - Up: falls, CHF, DVT, bleeding  Weakness       Subjective:      Patient is an 80year old male with previous medical history including COPD, CHF, DM, heart bl well.    Denies insomnia, fatigue, fever/chills, cough, SOB, dyspnea, angina, palpitations, n/v, diarrhea, constipation, and urinary sxs.       Objective:  /71   Pulse 95   Temp 97.9 °F (36.6 °C)   Resp 20   Wt 161 lb 1.6 oz   SpO2 95%   BMI 25.77 kg/ 8.6 05/30/2019    OSMOCALC 310 (H) 05/30/2019    ALKPHO 67 05/30/2019    AST 16 05/30/2019    ALT 34 05/30/2019    BILT 0.6 05/30/2019    TP 5.6 (L) 05/30/2019    ALB 2.5 (L) 05/30/2019    GLOBULIN 3.1 05/30/2019     05/30/2019    K 4.2 05/30/2019 request  - check FOB x3, on eliquis  - monitoring and check Hgb  - now BMs normal no bleeding, no abdominal pain     CKD III  - monitoring BMP  - currently creat improved from previous  - renal dose meds  - avoid nephrotoxic agents     DM type II, diet con

## 2019-06-05 PROBLEM — B02.30 ZOSTER OPHTHALMICUS: Status: ACTIVE | Noted: 2019-01-01

## 2019-06-05 NOTE — CONSULTS
INFECTIOUS DISEASE CONSULTATION    Chance Nur Patient Status:  Inpatient    10/3/1932 MRN [id number]   [facility] 5NW-A Attending Dimitry Graves MD   [facility] Day # 0 PCP MIRA Terry • Eye Problems Brother         Eye disorder   • Other (Other[other]) Brother         Hearing loss      reports that he quit smoking about 28 years ago. His smoking use included cigarettes. He has a 40.00 pack-year smoking history.  He has never used smoke times daily as needed for constipation. Disp: 60 tablet Rfl: 0   GuaiFENesin  MG Oral Tablet 12 Hr Take 1 tablet (600 mg total) by mouth 2 (two) times daily.  Disp: 60 tablet Rfl: 0   PROAIR RESPICLICK 582 (90 Base) MCG/ACT Inhalation Aerosol Powder, (36.6 °C)-99 °F (37.2 °C)] 97.9 °F (36.6 °C)  Pulse:  [97] 97  Resp:  [18-20] 20  BP: (109-111)/(64-75) 111/64  Skin: vesicles noted left forehead above eye lid  Bilateral eyes, no injection or swelling  Neck: No lymphadenopathy. supple, no masses  [address] swelling or injection, optho eval pending  Acyclovir to start, 600mg q12h, based on wt/ht  -await repeat creat, has baseline cri  May be a balance to keep hydrated to prevent crystalization of acyclovir, while keeping out of CHF.     antiicpate switch to va

## 2019-06-05 NOTE — H&P
[facility] Hospitalist H&P       CC: No chief complaint on file.        PCP: Felisha Noel MD    History of Present Illness:  Pt is an 79y/o M with MMP including COPD, heart block s/p PPM, DVT on eliquis, CKD, HTN who was sent as direct admission for management o [facility] Spears Leader   • [facility] [place]. SPLY LEAD PACEMAKER NON TRANSVENOUS SINGLE  2016    new RV lead placed due to high impedance-Dr. Hammad Goins   • PACEMAKER  05/25/2011    [employer] Pacemaker   • REPAIR ROTATOR CUFF,ACUTE      left        ALL:    Adam Varela status: Former Smoker        Packs/day: 1.00        Years: 40.00        Pack years: 40        Types: Cigarettes        Quit date: 1990        Years since quittin.9      Smokeless tobacco: Never Used    Alcohol use: Yes      Comment: 6 drinks per Left-[facility] (cpt=93970)    Result Date: 2019  ---------------------------------------------------------------------------- Lower Extremity Venous Duplex Patient: Nuris Noel Date: May 16 2019 11:06AM :     10/03/1932 (86yrs) occlusion. Left gastrocnemius vein: There is an acute thrombus, with luminal occlusion. Tables: Venous flow and imaging: +------------------+------------------+----------+-------------------------+ ! Location          ! Overall           ! Thrombosis! Flow pro +------------------+------------------+----------+-------------------------+ ! Right superficial !Patent            ! None      ! Compressible             ! !femoral distal    !                  !          !                         ! +------------------+----- +------------------+------------------+----------+-------------------------+ ! Left common       ! Partially occluded! Acute     ! Diminished phasicity;    ! !femoral           !                  !          !spontaneous; diminished  ! !                  ! +------------------+------------------+----------+-------------------------+ ! Left popliteal    !Totally occluded  ! Acute     ! Absent; absent           ! !                  !                  !          !spontaneity; no          ! !                  ! +------------------+------------------+----------+-------------------------+ ! Left small        ! Patent            ! None      ! Compressible             ! !saphenous         !                  !          !                         ! +------------------+----- infiltrates/edema unchanged with blunting of both costophrenic angles.     Dictated by: Sony Dalton MD on 5/22/2019 at 8:35     Approved by: Sony Dalton MD            Xr Chest Ap Portable  (cpt=71045)    Result Date: 5/21/2019  PROCEDURE:  XR CHEST AP AP chest radiograph was obtained. COMPARISON:  [facility] , XR CHEST AP PORTABLE  (CPT=71045), 5/16/2019, 21:21.   INDICATIONS:  increasing dyspnea on exertion  PATIENT STATED HISTORY: (As transcribed by Technologist)  Patient offered no additional history at (DDA=74873)    Result Date: 5/16/2019  PROCEDURE:  NM LUNG VQ VENT / PERFUSION SCAN  (BCC=33134)  COMPARISON:  [facility] , CANDY CHEST AP PORTABLE  (CPT=71045), 5/16/2019, 21:21. INDICATIONS:  rule out PE.   Left leg DVT  TECHNIQUE:  The patient was ventilated w will reasonably be expected to span two midnight's based on the clinical documentation in H+P. Based on patients current state of illness, I anticipate that, after discharge, patient will require TBD.

## 2019-06-05 NOTE — PLAN OF CARE
NURSING ADMISSION NOTE      Patient admitted via direct admit from rehab  Oriented to room. Safety precautions initiated. Bed in low position. Call light in reach, return demo done. Updated history and pta meds. Gave report to [employer].

## 2019-06-05 NOTE — PROGRESS NOTES
Krishna [place], 83/1932, 80year old, male    Chief Complaint:  Patient presents with:   Follow - Up: shingles left orbital region       Subjective:      Patient is an 80year old male with previous medical history including COPD, CHF, DM, heart block s/p supple, non tender, FROM  BREAST: deferred  RESPIRATORY:clear, no crackles, + exp wheezing posterior, no dyspnea,+ yellow sputum with cough, 3L/NC  CARDIOVASCULAR: RRR, S1 and S2, no murmurs, 2+ PEDAL edema, L>R  ABDOMEN:  normal active BS+, soft, nondiste transport to [facility], hypotension, weakness generalized  - fall, slipped out of the bed, no injuries, no new skin tears  - changed mattress to regular  - PT/OT eval and treat  - ELOS 14 days  - DC TBD, was at home with family, considering caregiver weekly  Next on Wednesday   Check FOB        Follow Ups:  PCP within 7 days of DC  Pulmonary Dr Rubia Carnes in 3 weeks      Sending to Marlon Maharaj for concern over shingles to left orbital region, urgent eye exam and eval for IV steroids  DW patient and daughter, Brien Pemberton

## 2019-06-06 NOTE — CONSULTS
[facility]  Report of Consultation    Belkisdekandis Swartzmarino Patient Status:  Inpatient    10/3/1932 MRN [id number]   [facility] 5NW-A Attending Kishore Quezada MD   Hosp Day # 0 PCP Adal Topete MD       Assessment / Plan:    1) Ophthalmic Dr Willy Watson SINGLE  2016    new RV lead placed due to high impedance-Dr. Shaina Chavez   • PACEMAKER  05/25/2011    [employer] Pacemaker   • REPAIR ROTATOR CUFF,ACUTE      left     Family History   Problem Relation Age o °C) (Oral)   Resp 20   Temp (24hrs), Av.5 °F (36.9 °C), Min:97.9 °F (36.6 °C), Max:99 °F (37.2 °C)       Intake/Output Summary (Last 24 hours) at 2019  Last data filed at 2019 1500  Gross per 24 hour   Intake —   Output 250 ml   Net -250 m

## 2019-06-06 NOTE — PROGRESS NOTES
[facility]  Nephrology Progress Note    Chance Nur Attending:  Dimitry Graves MD       Assessment and Plan:    1) Ophthalmic zoster- on acyclovir IV -> PO per ID     2) Fluid overload / CHF- in part due to holding diuretics during recent hosp / Imaging: All imaging studies reviewed.     Meds:     Current Facility-Administered Medications:  simethicone (MYLICON) chewable tab 80 mg 80 mg Oral QID PRN   ipratropium-albuterol (DUONEB) nebulizer solution 3 mL 3 mL Nebulization QID   [START ON 6/

## 2019-06-06 NOTE — PROGRESS NOTES
Problem: Ocular shingles L eye      Data: Pt A&Ox4 , VSS. 3L O2 @ rest, 6 L O2 with ambulation. Patient is at baseline for oxygen usage. Telemetry monitored . Pt has a pacemaker. Voids. Pt c/o L eye pain and itching. MD aware.  Up with standby assist w/ wal

## 2019-06-06 NOTE — CONSULTS
[facility]    PATIENT'S NAME: Janelle Ty   ATTENDING PHYSICIAN: Hector Buck MD   CONSULTING PHYSICIAN: Filipe Patton M.D.    PATIENT ACCOUNT#:   [de-identified]    LOCATION:  [address] [place] [address]  MEDICAL RECORD #:   [id number]       DATE OF BIRTH:  [birth year] the left eye twice a day. I did inform him that this can have side effects, including raising eye pressure. He should be rechecked as an outpatient in the next few weeks.       Dictated By Rebecca Shirley M.D.  d: 06/05/2019 20:13:46  t: 06/05/2019 20:27:14  J

## 2019-06-06 NOTE — PHYSICAL THERAPY NOTE
PHYSICAL THERAPY EVALUATION - INPATIENT     Room Number: 856/031-P  Evaluation Date: 6/6/2019  Type of Evaluation: Initial  Physician Order: PT Eval and Treat    Presenting Problem: shingles  Reason for Therapy: Mobility Dysfunction and Discharge Kate of home)  Stairs to Enter : 2  Railing: No  Stairs to Bedroom: 0       Lives With: Daughter(son in law)  Drives: No  Patient Owned Equipment: Rolling walker(rollator)  Patient Regularly Uses: Glasses    Prior Level of [place]: per pt he was ind with a hospital room?: None   -   Climbing 3-5 steps with a railing?: A Little       AM-PAC Score:  Raw Score: 23   Approx Degree of Impairment: 11.2%   Standardized Score (AM-PAC Scale): 56.93   CMS Modifier (G-Code): CI    FUNCTIONAL ABILITY STATUS  Gait Assess patient in returning to prior to level of function. DISCHARGE RECOMMENDATIONS  PT Discharge Recommendations: Home with home health PT    PLAN  PT Treatment Plan: Bed mobility; Endurance; Energy conservation;Patient education; Family education;Gait training;S

## 2019-06-06 NOTE — PLAN OF CARE
Pt AOx4. VSS on 3L, 6L with activity, which is baseline. Tele, V paced. Eliquis. Iv lasix. Voids. No c.o pain this AM. Up SBA with walker. Oral steroids. Iv acyclovir. Tko. Scattered bruising. Skin tear to rt. Forearm. Renal diet with QID accu checks.  Pt u evaluate response  - Consider cultural and social influences on pain and pain management  - Manage/alleviate anxiety  - Utilize distraction and/or relaxation techniques  - Monitor for opioid side effects  - Notify MD/LIP if interventions unsuccessful or pa ADULT  Goal: Skin integrity remains intact  Description  INTERVENTIONS  - Assess and document risk factors for pressure ulcer development  - Assess and document skin integrity  - Monitor for areas of redness and/or skin breakdown  - Initiate interventions,

## 2019-06-06 NOTE — PLAN OF CARE
Recommend HHPT upon D/C  Problem: Impaired Functional Mobility  Goal: Achieve highest/safest level of mobility/gait  Description  Interventions:  - Assess patient's functional ability and stability  - Promote increasing activity/tolerance for mobility and

## 2019-06-06 NOTE — PROGRESS NOTES
[facility] Hospitalist Progress Note                                                                   [address] [facility]  10/3/1932    SUBJECTIVE:  Pt seen and examined.   States he has less pain today acetate  1 drop Left Eye BID     Continuous Infusions:   PRN: simethicone, acetaminophen, Albuterol Sulfate HFA, glucose **OR** Glucose-Vitamin C **OR** dextrose **OR** glucose **OR** Glucose-Vitamin C    Assessment/Plan:  Active Problems:    Zoster ophtha

## 2019-06-06 NOTE — CM/SW NOTE
Met with pt to discuss DC planning. Pt normally lives with his dtr/CLAUDE/grdson in [place], but was recently admitted to [facility] for [address]. Pt stated that he has been there for 4 days and would like to return there at [place].   He is also current with Resident

## 2019-06-06 NOTE — DIETARY NOTE
GREGORY/ Joan Palma 88     Admitting diagnosis:  ocular shingles  Zoster ophthalmicus    Ht:  5'6\"  Wt: 73.8 kg (162 lb 12.8 oz). Body mass index is 26.04 kg/m².   IBW: 64.5kg  Wt Readings from Last 6 Encounters:  06/06/19

## 2019-06-06 NOTE — PROGRESS NOTES
[facility]                INFECTIOUS DISEASE PROGRESS NOTE    Gema Ochoa Patient Status:  Inpatient    10/3/1932 MRN [id number]   [facility] 5NW-A Attending Kerwin Ko MD   Hosp Day # 1 PCP Ophelia Molina MD     Antibioti Trigeminal zoster,stable  On iv acyclovir, can switch to PO acyclovir after next dose or PO valtrex for dc, renal adjusted  -----at increased risk of crystalization with iv dieuretics and CRI with ongoing iv acyclovir    2.  Edema, lasix per renal      [place]

## 2019-06-06 NOTE — PLAN OF CARE
Patient alert and orientated. Oxygen WNL on 3L at rest, 6L with ambulation, . Paced on tele. Pt with complaints of pain to the left side of face, PRN medications given. Tolerating diet. Voiding per urinal. Up with assistance and walker.  Pt instructed assessment  - Modify environment to reduce risk of injury  - Provide assistive devices as appropriate  - Consider OT/PT consult to assist with strengthening/mobility  - Encourage toileting schedule  Outcome: Progressing     Problem: [address]

## 2019-06-06 NOTE — HOME CARE LIAISON
Attending Physician: Rubi Nguyen MD    Review Type: ADMISSION   Reviewer: Tiffanie Lozoya       Date: February 24, 2017 - 8:40 AM  Payor: [id number] [place] [address] Number:  [id number]  Admit date: 2/21/2017  5:43 AM     Operative Report by Yossi Hernandez Patient is pending with [facility] for RN PT. Patient went to [facility] and discharged to [facility] of [address]. complications, and personel involved.  A consent was obtained.     PROCEDURE:  The patient was brought back to the operating room.  Patient was given intravenous antibiotics preoperatively.  Bilateral EPC cuffs were placed on the lower extremities.  General vein was left open.  Total warm ischemia time was 10  min.  The mass was completely excised and sent to pathology.  We were able to close the kidney defect with an 0-Vicryl suture.  There was good reperfusion of the kidney and no bleeding was noted from th the abdomen is 7/10, constnat, dull, no report of radiation of the pain.  No chest pain, no SOB, no nausea/emesis  Past Medical History    Diagnosis  Date    •  BACK PAIN      •  HYPERTENSION      •  HYPOTHYROIDISM      •  OTHER DISEASES          thyroid ca 02/21/17  1403    BP:      Pulse:  100    Temp:      Resp:        Gen: awake, alert, no respiratory distress  HEENT; mmm, anicteric, EOMI  Neck no JVD  Lymph; no tender cervical LAD  CV: RRR, nl S1/S2  Pulm: CTA b/l, decreased breath sounds at bases  Abd; heard previously this admission)  Pulm: CTA b/l  Abd; soft, tender without guarding/rebound, hypoactive bowel sounds  Ext: no pitting edema    Labs  Recent Labs    Lab  02/21/17   1752   02/22/17   0506   02/23/17   0521   02/23/17   1205    RBC   4.36   3 MEDICATIONS ADMINISTERED IN LAST 1 DAY:  acetaminophen (TYLENOL) tab 650 mg     Date Action Dose Route User    2/24/2017 0833 Given 650 mg Oral Jose Hein RN    2/23/2017 1458 Given 650 mg Oral Tyler Gonzalez RN    2/23/201 following:     Glucose 143 (*)     BUN 24 (*)     Calcium, Total 7.9 (*)     All other components within normal limits   HEMOGLOBIN - Abnormal; Notable for the following:     HGB 8.7 (*)     All other components within normal limits   HEMATOCRIT - Abnormal Status                     ---------                               -----------         ------                     CBC W/ DIFFERENTIAL[[id number]]          Abnormal            Final result                 Please view results for these tests on the ann

## 2019-06-07 NOTE — PROGRESS NOTES
06/07/19 1359   Clinical Encounter Type   Visited With Patient   Sacramental Encounters   Sacrament of Sick-Anointing Anointed   The patient was seen by Mora Pickard. Received prayer, Scripture, support and Sacrament of the Sick.

## 2019-06-07 NOTE — PLAN OF CARE
Problem: Patient/Family Goals  Goal: Patient/Family Long Term Goal  Description  Patient's Long Term Goal: 6/5(pm) to go home     Interventions:  - See additional Care Plan goals for specific interventions   Outcome: Progressing  Goal: Patient/Family Jackeline PLANNING  Goal: Discharge to home or other facility with appropriate resources  Description  INTERVENTIONS:  - Identify barriers to discharge w/pt and caregiver  - Include patient/family/discharge partner in discharge planning  - Arrange for needed dischar retention  Outcome: Progressing     Problem: Impaired Functional Mobility  Goal: Achieve highest/safest level of mobility/gait  Description  Interventions:  - Assess patient's functional ability and stability  - Promote increasing activity/tolerance for mo

## 2019-06-07 NOTE — PROGRESS NOTES
NURSING DISCHARGE NOTE    Discharged Nursing home via Ambulance. Accompanied by Support staff  Belongings Taken by patient/family. Pt is assessed and ready for discharge. Instructions/follow up/new medications gone over with RN at [place] [address]. IV dc'amaury.

## 2019-06-07 NOTE — OCCUPATIONAL THERAPY NOTE
OCCUPATIONAL THERAPY EVALUATION - INPATIENT     Room Number: 368/218-O  Evaluation Date: 6/6/2019  Type of Evaluation: Initial  Presenting Problem: zoster opthalmicus, occular shingles     Physician Order: IP Consult to Occupational Therapy  Reason for The Home: House  Home Layout: Two level(pt stays on 1st level of home)  Lives With: Daughter(son in law)    Toilet and Equipment: Standard height toilet  Shower/Tub and Equipment: Walk-in shower  Other Equipment: Other (Comment)(walker )    Occupation/Status: need…  -   Putting on and taking off regular lower body clothing?: A Little  -   Bathing (including washing, rinsing, drying)?: A Little  -   Toileting, which includes using toilet, bedpan or urinal? : A Little  -   Putting on and taking off regular upper by pt's CLAUDE once home. The patient is functioning below his previous functional level and would benefit from skilled inpatient OT to address the above deficits, maximizing patient’s ability to return safely to his prior level of function.     Patient Co

## 2019-06-07 NOTE — PROGRESS NOTES
Pt refusing am labs.  Pt discussed with Dr. Paige Rucker  Verified with Dr. Paige Rucker -ok for pt to refuse labs this am.

## 2019-06-07 NOTE — PLAN OF CARE
Problem: Patient/Family Goals  Goal: Patient/Family Long Term Goal  Description  Patient's Long Term Goal: 6/5(pm) to go home     Interventions:  - See additional Care Plan goals for specific interventions   Outcome: Adequate for Discharge  Goal: Patient Discharge     Problem: DISCHARGE PLANNING  Goal: Discharge to home or other facility with appropriate resources  Description  INTERVENTIONS:  - Identify barriers to discharge w/pt and caregiver  - Include patient/family/discharge partner in discharge plann Manage/alleviate anxiety  - Monitor for signs/symptoms of CO2 retention  Outcome: Adequate for Discharge     Problem: Impaired Functional Mobility  Goal: Achieve highest/safest level of mobility/gait  Description  Interventions:  - Assess patient's functio

## 2019-06-07 NOTE — CM/SW NOTE
Patient ready for dc today. Spoke to [place], ok to return at [place] [address] Ambulance requested for [facility]. PCS form on chart.     Ana Agee RN   [facility] [address]  [phone]    [facility]  [phone]

## 2019-06-07 NOTE — DISCHARGE SUMMARY
General Medicine Discharge Summary     Patient ID:  Shobha Chao  80year old  10/3/1932    Admit date: 6/5/2019    Discharge date and time: 6/7/2019    Attending Physician: Olivia Arnold Current Discharge Medication List    START taking these medications    acetaminophen 325 MG Oral Tab  Take 2 tablets (650 mg total) by mouth every 6 (six) hours as needed.     prednisoLONE acetate 1 % Ophthalmic Suspension  Place 1 drop into the left ey FASTCLIX LANCETS Does not apply Misc  Use one lancet (there are 6 lancets in each drum) for each blood glucose check, 2x a day    Insulin Pen Needle (BD PEN NEEDLE SELENA U/F) 32G X 4 MM Does not apply Misc  Use one pen needle for each insulin injection, 2x

## 2019-06-07 NOTE — PROGRESS NOTES
[facility]  Nephrology Progress Note    Edna Lund Attending:  Kerrin Cowden, MD       Assessment and Plan:    1) Ophthalmic zoster- on acyclovir IV -> PO per ID     2) Fluid overload / CHF- in part due to holding diuretics during recent hosp / Nebulization QID   acyclovir (ZOVIRAX) tab 800 mg 800 mg Oral Q8H   acetaminophen (TYLENOL) tab 650 mg 650 mg Oral Q6H PRN   apixaban (ELIQUIS) tab 5 mg 5 mg Oral BID   Fluticasone Propionate (FLONASE) 50 MCG/ACT nasal spray 1 spray 1 spray Each Nare Daily

## 2019-06-10 NOTE — PROGRESS NOTES
Gerijeannie Parmjit  : 10/3/1932  Age 80year old  male patient is admitted to Facility: The [address] [place] [address] at [facility] for [employer] and medical management.     [address] [place] [address] Drive date:  19  Discharge date to [facility]:  19  ELOS:  14 days  Anticipated di • Chicken pox    • COPD (chronic obstructive pulmonary disease) ([facility])    • COPD (chronic obstructive pulmonary disease) ([facility])     Management:  sleeps with oxygen 2 L via NC at night   • Deep vein thrombosis ([address]    • Diabetes ([address]    • Esophageal reflu Oral Tab Take 2 tablets (650 mg total) by mouth every 6 (six) hours as needed. Disp: 30 tablet Rfl: 0   prednisoLONE acetate 1 % Ophthalmic Suspension Place 1 drop into the left eye 2 (two) times daily for 14 days.  Disp: 1 Bottle Rfl: 0   predniSONE 5 MG O MCG/INH Inhalation Aerosol Powder, Breath Activated Inhale 1 puff into the lungs daily. Disp: 1 each Rfl: 6   vitamin E 400 UNITS Oral Cap Take 400 Units by mouth daily. Disp:  Rfl:    omega-3 fatty acids 1000 MG Oral Cap Take 1,000 mg by mouth daily.  Gerardo Pierre ledt eyelid swollen, edema,   HENT: normocephalic; normal nose, no nasal drainage, mucous membranes pink, moist.  NECK: supple, non tender, FROM  BREAST: deferred  RESPIRATORY:diminshed, no crackles, no wheezing, no dyspnea, no cough, 3L/NC  CARDIOVASCULAR L eye until 6/21  - continue on isolation until no new lesions and old lesions crusted over  - monitoring for sx    Falls, hypotension, weakness generalized  - fall, slipped out of the bed, no injuries, no new skin tears  - changed mattress to regular  - P reviewing medical records, labs, completing medication reconciliation and entering orders to establish plan of care in [facility].     Shahriar Cazares, RAMSES  06/10/19   10:42 AM

## 2019-06-10 NOTE — CM/SW NOTE
06/10/19 1000   Discharge disposition   Expected discharge disposition Skilled Nurs   Name of Facillity/Home Care/Hospice   ([facility]   Patient is Discharged to a [address] [place] [address] Yes   Discharge transportation [employer]     Patient di

## 2019-06-14 NOTE — PROGRESS NOTES
Akosua Whipple, 83/1932, 80year old, male    Chief Complaint:  Patient presents with:   Follow - Up: zoster left forhead and eye, CHF,   Lab Results       Subjective:      HPI  Patient is an 80year old male with previous medical history including COPD, injectedl; there is no nystagmus, no drainage from eyes, left eyelid swollen, edema,   HENT: normocephalic; normal nose, no nasal drainage, mucous membranes pink, moist.  NECK: supple, non tender, FROM  BREAST: deferred  RESPIRATORY:diminshed, no crackles, No new lesions and old lesions crusted over\  - monitoring for sx, no pain    Falls, hypotension, weakness generalized  - fall, slipped out of the bed, no injuries, no new skin tears  - changed mattress to regular  - PT/OT eval and treat  - ELOS 14 days  -

## 2019-06-17 NOTE — PROGRESS NOTES
Corin Joseph, 83/1932, 80year old, male    Chief Complaint:  Patient presents with:   Follow - Up: zoster, CHF, COPD,        Subjective:      HPI  Patient is an 80year old male with previous medical history including COPD, CHF, DM, heart block s/p PPM injectedl; there is no nystagmus, no drainage from eyes  HENT: normocephalic; normal nose, no nasal drainage, mucous membranes pink, moist.  NECK: supple, non tender, FROM  BREAST: deferred  RESPIRATORY:diminshed, no crackles, no wheezing, no dyspnea, no c monitoring for sx, no pain    Falls, hypotension, weakness generalized  - fall, slipped out of the bed, no injuries, no new skin tears  - changed mattress to regular  - PT/OT eval and treat  - ELOS 14 days  - DC 6/24, was at home with family, considering c Marti Cardoza on DC and prn    Shahriar Cazares, APRN  06/17/19

## 2019-06-19 NOTE — PROGRESS NOTES
Candace Carlton, 83/1932, 80year old, male    Chief Complaint:  Patient presents with:   Follow - Up: zoster with ocular involvement, CHDf, DM, COPD  Lab Results  Weakness       Subjective:      HPI  Patient is an 80year old male with previous medical hi Skin tears BUE  EYES: PERRLA, EOMI, sclera anicteric, conjunctiva no injection; there is no nystagmus, no drainage from eyes  HENT: normocephalic; normal nose, no nasal drainage, mucous membranes pink, moist.  NECK: supple, non tender, FROM  BREAST: deferr 6/21  - off isolation- No new lesions and old lesions crusted over\  - monitoring for sx, no pain    Falls, hypotension, weakness generalized  - fall, slipped out of the bed, no injuries, no new skin tears  - changed mattress to regular  - PT/OT eval and t Ups:  PCP within 7 days of DC  Pulmonary Dr Enedelia Childress in 3 weeks  Dr Milton Jorge Opthalmology need to schedule [phone]  Dr Marti Cardoza on DC and prn    Shahriar Cazares, APRN  06/19/19

## 2019-06-24 NOTE — PROGRESS NOTES
Jerrell Siddiqi, 83/1932, 80year old, male is being discharged from [facility] at [address] [facility]    Date of [facility]    Date of Discharge: anticipated 6/25/19                                 Admitting Diagnos swelling, no blisters remain  WOUND: Skin tears BUE- healing  EYES: PERRLA, EOMI, sclera anicteric, conjunctiva no injection; there is no nystagmus, no drainage from eyes  HENT: normocephalic; normal nose, no nasal drainage, mucous membranes pink, moist. GFRNAA 34 (L) 06/13/2019    GFRAA 40 (L) 06/13/2019    CA 8.8 06/13/2019    OSMOCALC 302 (H) 06/13/2019    ALKPHO 69 06/08/2019    AST 16 06/08/2019    ALT 31 06/08/2019    BILT 1.1 06/08/2019    TP 5.3 (L) 06/08/2019    ALB 2.6 (L) 06/08/2019    GLOBULIN BMs normal no bleeding, no abdominal pain     CKD III  - monitoring BMP  - currently creat improved from previous  - renal dose meds  - avoid nephrotoxic agents  - follows with Dr Violet Waggoner     DM type II, diet controlled, hyperglycemia on steroids  - monitorin

## 2020-08-25 NOTE — PROCEDURES
DESCRIPTION:  Witnessed verbal and written informed consent was obtained. Time out was performed by the staff. The patient was informed of the nature of the procedure, alternatives and risks.   Ultrasound-guided needle aspiration was performed in the   usu Hpi Title: Evaluation of a Skin Lesion

## 2021-01-11 ENCOUNTER — HISTORICAL (OUTPATIENT)
Dept: ADMINISTRATIVE | Facility: HOSPITAL | Age: 86
End: 2021-01-11

## 2021-01-18 ENCOUNTER — HISTORICAL (OUTPATIENT)
Dept: ADMINISTRATIVE | Facility: HOSPITAL | Age: 86
End: 2021-01-18

## 2021-01-18 LAB
ABS NEUT (OLG): 8.12 X10(3)/MCL (ref 2.1–9.2)
ALBUMIN SERPL-MCNC: 3.6 GM/DL (ref 3.4–4.8)
ALBUMIN/GLOB SERPL: 1.2 RATIO (ref 1.1–2)
ALP SERPL-CCNC: 88 UNIT/L (ref 40–150)
ALT SERPL-CCNC: 12 UNIT/L (ref 0–55)
AST SERPL-CCNC: 18 UNIT/L (ref 5–34)
BASOPHILS # BLD AUTO: 0 X10(3)/MCL (ref 0–0.2)
BASOPHILS NFR BLD AUTO: 0 %
BILIRUB SERPL-MCNC: 0.5 MG/DL
BILIRUBIN DIRECT+TOT PNL SERPL-MCNC: 0.2 MG/DL (ref 0–0.5)
BILIRUBIN DIRECT+TOT PNL SERPL-MCNC: 0.3 MG/DL (ref 0–0.8)
BUN SERPL-MCNC: 16.6 MG/DL (ref 8.4–25.7)
CALCIUM SERPL-MCNC: 8.6 MG/DL (ref 8.8–10)
CHLORIDE SERPL-SCNC: 106 MMOL/L (ref 98–107)
CO2 SERPL-SCNC: 25 MMOL/L (ref 23–31)
CREAT SERPL-MCNC: 0.88 MG/DL (ref 0.73–1.18)
CRP SERPL HS-MCNC: 0.15 MG/DL
EOSINOPHIL # BLD AUTO: 0.1 X10(3)/MCL (ref 0–0.9)
EOSINOPHIL NFR BLD AUTO: 1 %
ERYTHROCYTE [DISTWIDTH] IN BLOOD BY AUTOMATED COUNT: 12.5 % (ref 11.5–17)
ERYTHROCYTE [SEDIMENTATION RATE] IN BLOOD: 2 MM/HR (ref 0–15)
EST. AVERAGE GLUCOSE BLD GHB EST-MCNC: 125.5 MG/DL
GLOBULIN SER-MCNC: 3 GM/DL (ref 2.4–3.5)
GLUCOSE SERPL-MCNC: 160 MG/DL (ref 82–115)
HBA1C MFR BLD: 6 %
HCT VFR BLD AUTO: 45.3 % (ref 42–52)
HGB BLD-MCNC: 15 GM/DL (ref 14–18)
LYMPHOCYTES # BLD AUTO: 1.4 X10(3)/MCL (ref 0.6–4.6)
LYMPHOCYTES NFR BLD AUTO: 13 %
MCH RBC QN AUTO: 32.1 PG (ref 27–31)
MCHC RBC AUTO-ENTMCNC: 33.1 GM/DL (ref 33–36)
MCV RBC AUTO: 96.8 FL (ref 80–94)
MONOCYTES # BLD AUTO: 1.1 X10(3)/MCL (ref 0.1–1.3)
MONOCYTES NFR BLD AUTO: 10 %
NEUTROPHILS # BLD AUTO: 8.12 X10(3)/MCL (ref 2.1–9.2)
NEUTROPHILS NFR BLD AUTO: 75 %
PLATELET # BLD AUTO: 167 X10(3)/MCL (ref 130–400)
PMV BLD AUTO: 12.7 FL (ref 9.4–12.4)
POTASSIUM SERPL-SCNC: 3.8 MMOL/L (ref 3.5–5.1)
PREALB SERPL-MCNC: 21.1 MG/DL (ref 16–42)
PROT SERPL-MCNC: 6.6 GM/DL (ref 5.8–7.6)
RBC # BLD AUTO: 4.68 X10(6)/MCL (ref 4.7–6.1)
SODIUM SERPL-SCNC: 144 MMOL/L (ref 136–145)
WBC # SPEC AUTO: 10.8 X10(3)/MCL (ref 4.5–11.5)

## 2021-01-25 ENCOUNTER — HISTORICAL (OUTPATIENT)
Dept: ADMINISTRATIVE | Facility: HOSPITAL | Age: 86
End: 2021-01-25

## 2021-02-01 ENCOUNTER — HISTORICAL (OUTPATIENT)
Dept: ADMINISTRATIVE | Facility: HOSPITAL | Age: 86
End: 2021-02-01

## 2021-02-08 ENCOUNTER — HISTORICAL (OUTPATIENT)
Dept: ADMINISTRATIVE | Facility: HOSPITAL | Age: 86
End: 2021-02-08

## 2021-02-23 ENCOUNTER — HISTORICAL (OUTPATIENT)
Dept: ADMINISTRATIVE | Facility: HOSPITAL | Age: 86
End: 2021-02-23

## 2021-03-08 ENCOUNTER — HISTORICAL (OUTPATIENT)
Dept: ADMINISTRATIVE | Facility: HOSPITAL | Age: 86
End: 2021-03-08

## 2021-03-25 ENCOUNTER — HISTORICAL (OUTPATIENT)
Dept: ADMINISTRATIVE | Facility: HOSPITAL | Age: 86
End: 2021-03-25

## 2021-03-25 LAB
ABS NEUT (OLG): 5.48 X10(3)/MCL (ref 2.1–9.2)
ALBUMIN SERPL-MCNC: 3.8 GM/DL (ref 3.4–4.8)
ALBUMIN/GLOB SERPL: 1.5 RATIO (ref 1.1–2)
ALP SERPL-CCNC: 101 UNIT/L (ref 40–150)
ALT SERPL-CCNC: 13 UNIT/L (ref 0–55)
AST SERPL-CCNC: 17 UNIT/L (ref 5–34)
BASOPHILS # BLD AUTO: 0 X10(3)/MCL (ref 0–0.2)
BASOPHILS NFR BLD AUTO: 1 %
BILIRUB SERPL-MCNC: 0.8 MG/DL
BILIRUBIN DIRECT+TOT PNL SERPL-MCNC: 0.3 MG/DL (ref 0–0.5)
BILIRUBIN DIRECT+TOT PNL SERPL-MCNC: 0.5 MG/DL (ref 0–0.8)
BUN SERPL-MCNC: 15.7 MG/DL (ref 8.4–25.7)
CALCIUM SERPL-MCNC: 8.8 MG/DL (ref 8.8–10)
CHLORIDE SERPL-SCNC: 108 MMOL/L (ref 98–107)
CHOLEST SERPL-MCNC: 176 MG/DL
CHOLEST/HDLC SERPL: 4 {RATIO} (ref 0–5)
CO2 SERPL-SCNC: 27 MMOL/L (ref 23–31)
CREAT SERPL-MCNC: 0.9 MG/DL (ref 0.73–1.18)
EOSINOPHIL # BLD AUTO: 0.2 X10(3)/MCL (ref 0–0.9)
EOSINOPHIL NFR BLD AUTO: 2 %
ERYTHROCYTE [DISTWIDTH] IN BLOOD BY AUTOMATED COUNT: 12.4 % (ref 11.5–17)
GLOBULIN SER-MCNC: 2.6 GM/DL (ref 2.4–3.5)
GLUCOSE SERPL-MCNC: 92 MG/DL (ref 82–115)
HCT VFR BLD AUTO: 47.9 % (ref 42–52)
HDLC SERPL-MCNC: 48 MG/DL (ref 35–60)
HGB BLD-MCNC: 15.4 GM/DL (ref 14–18)
LDLC SERPL CALC-MCNC: 115 MG/DL (ref 50–140)
LYMPHOCYTES # BLD AUTO: 1.2 X10(3)/MCL (ref 0.6–4.6)
LYMPHOCYTES NFR BLD AUTO: 16 %
MCH RBC QN AUTO: 31.3 PG (ref 27–31)
MCHC RBC AUTO-ENTMCNC: 32.2 GM/DL (ref 33–36)
MCV RBC AUTO: 97.4 FL (ref 80–94)
MONOCYTES # BLD AUTO: 1 X10(3)/MCL (ref 0.1–1.3)
MONOCYTES NFR BLD AUTO: 12 %
NEUTROPHILS # BLD AUTO: 5.48 X10(3)/MCL (ref 2.1–9.2)
NEUTROPHILS NFR BLD AUTO: 69 %
PLATELET # BLD AUTO: 191 X10(3)/MCL (ref 130–400)
PMV BLD AUTO: 12.6 FL (ref 9.4–12.4)
POTASSIUM SERPL-SCNC: 4.1 MMOL/L (ref 3.5–5.1)
PROT SERPL-MCNC: 6.4 GM/DL (ref 5.8–7.6)
RBC # BLD AUTO: 4.92 X10(6)/MCL (ref 4.7–6.1)
SODIUM SERPL-SCNC: 144 MMOL/L (ref 136–145)
TRIGL SERPL-MCNC: 67 MG/DL (ref 34–140)
TSH SERPL-ACNC: 4.29 UIU/ML (ref 0.35–4.94)
VLDLC SERPL CALC-MCNC: 13 MG/DL
WBC # SPEC AUTO: 7.9 X10(3)/MCL (ref 4.5–11.5)

## 2021-03-31 ENCOUNTER — HISTORICAL (OUTPATIENT)
Dept: ADMINISTRATIVE | Facility: HOSPITAL | Age: 86
End: 2021-03-31

## 2021-04-14 ENCOUNTER — HISTORICAL (OUTPATIENT)
Dept: ADMINISTRATIVE | Facility: HOSPITAL | Age: 86
End: 2021-04-14

## 2021-04-28 ENCOUNTER — HISTORICAL (OUTPATIENT)
Dept: ADMINISTRATIVE | Facility: HOSPITAL | Age: 86
End: 2021-04-28

## 2021-05-12 ENCOUNTER — HISTORICAL (OUTPATIENT)
Dept: ADMINISTRATIVE | Facility: HOSPITAL | Age: 86
End: 2021-05-12

## 2021-05-27 ENCOUNTER — HISTORICAL (OUTPATIENT)
Dept: ADMINISTRATIVE | Facility: HOSPITAL | Age: 86
End: 2021-05-27

## 2021-06-24 ENCOUNTER — HISTORICAL (OUTPATIENT)
Dept: ADMINISTRATIVE | Facility: HOSPITAL | Age: 86
End: 2021-06-24

## 2021-07-22 ENCOUNTER — HISTORICAL (OUTPATIENT)
Dept: ADMINISTRATIVE | Facility: HOSPITAL | Age: 86
End: 2021-07-22

## 2021-08-19 ENCOUNTER — HISTORICAL (OUTPATIENT)
Dept: ADMINISTRATIVE | Facility: HOSPITAL | Age: 86
End: 2021-08-19

## 2021-09-16 ENCOUNTER — HISTORICAL (OUTPATIENT)
Dept: ADMINISTRATIVE | Facility: HOSPITAL | Age: 86
End: 2021-09-16

## 2021-10-14 ENCOUNTER — HISTORICAL (OUTPATIENT)
Dept: ADMINISTRATIVE | Facility: HOSPITAL | Age: 86
End: 2021-10-14

## 2021-12-02 ENCOUNTER — HISTORICAL (OUTPATIENT)
Dept: ADMINISTRATIVE | Facility: HOSPITAL | Age: 86
End: 2021-12-02

## 2022-01-13 ENCOUNTER — HISTORICAL (OUTPATIENT)
Dept: ADMINISTRATIVE | Facility: HOSPITAL | Age: 87
End: 2022-01-13

## 2022-01-24 NOTE — PLAN OF CARE
HISTORY OF PRESENT ILLNESS:    Farhat Hills, is a 49 year old male who is here today for a general physical examination.  Also complains of some loss of appetite and sore mouth since last few weeks.  He is having pretty bad problem with sleep apnea and the CPAP machine.  He is biting his cheeks and tongue at night and now feeling lot of rawness in the mouth.  His doctor told him that it said dry mouth and give him some medication to help that but that making him feel worse so he stopped taking it.  He already got everything fixed with his teeth but that also did not help it.  He is going to get both jaw advancement surgery for helping his sleep apnea, in March of this year.    ALLERGIES:   Allergen Reactions   • Pollen Cough and SHORTNESS OF BREATH       No past medical history on file.  Sleep apnea, hypothyroidism, hypertriglyceridemia, dry mouth    Past Surgical History:   Procedure Laterality Date   • Esophagogastroduodenoscopy  10/23/2000       Current Outpatient Medications   Medication Sig   • acamprosate (CAMPRAL EC) 333 MG tablet TAKE 2 TABLET BY MOUTH THREE TIMES A DAY   • Artificial Tear Solution (GenTeal Tears) 0.1-0.2-0.3 % Solution USE 1 DROP 4 TIMES A DAY AS NEEDED   • cevimeline (EVOXAC) 30 MG capsule TAKE 1 CAPSULE (30 MG) BY MOUTH 3 TIMES PER DAY   • omega-3 acid ethyl esters (LOVAZA) 1 g capsule TAKE 2 CAPSULES ( 2 GMS ) BY MOUTH TWICE A DAY   • omeprazole (PriLOSEC) 40 MG capsule Take 40 mg by mouth daily.   • pilocarpine (SALAGEN) 5 MG tablet    • zolpidem (AMBIEN) 10 MG tablet TAKE 1 TABLET BY MOUTH EVERY NIGHT AS NEEDED   • fenofibrate 160 MG tablet Take 1 tablet by mouth daily.   • levothyroxine (SYNTHROID, LEVOTHROID) 50 MCG tablet Take 1 tablet by mouth daily.   • amphetamine-dextroamphetamine (ADDERALL) 20 MG tablet Take 20 mg by mouth daily.   • Multiple Vitamins-Minerals (MULTIVITAMIN PO) Take  by mouth.   • tadalafil (CIALIS) 20 MG tablet Take 1 tablet by mouth daily as needed for  Problem: RESPIRATORY - ADULT  Goal: Achieves optimal ventilation and oxygenation  INTERVENTIONS:  - Assess for changes in respiratory status  - Assess for changes in mentation and behavior  - Position to facilitate oxygenation and minimize respiratory effo Erectile Dysfunction.   • rosuvastatin (CRESTOR) 20 MG tablet Take 20 mg by mouth daily.   • Lansoprazole (PREVACID PO) Take 40 mg by mouth.   • Fexofenadine HCl (ALLEGRA ALLERGY PO) Take 30 mg by mouth.   • AH Custom (40% BENADRYL/40% MAALOX/20% OF 1% VISCOUS XYLOCAINE) Use 5ml qid   • GEMFIBROZIL 600 MG PO TABS 1 TAB PO BID BEFORE MEALS     No current facility-administered medications for this visit.       SOCIAL HISTORY:  Social History     Socioeconomic History   • Marital status: /Civil Union     Spouse name: Not on file   • Number of children: Not on file   • Years of education: Not on file   • Highest education level: Not on file   Occupational History   • Not on file   Tobacco Use   • Smoking status: Current Every Day Smoker     Packs/day: 0.50     Types: Cigarettes   • Smokeless tobacco: Current User   Substance and Sexual Activity   • Alcohol use: No   • Drug use: Not on file   • Sexual activity: Not on file   Other Topics Concern   • Not on file   Social History Narrative   • Not on file     Social Determinants of Health     Financial Resource Strain: Not on file   Food Insecurity: Not on file   Transportation Needs: Not on file   Physical Activity: Not on file   Stress: Not on file   Social Connections: Not on file   Intimate Partner Violence: Not on file   Chew tobacco    Most Recent Immunizations   Administered Date(s) Administered   • COVID-19 12Y+ Pfizer-[employer] - Requires Dilution 10/26/2021   • Influenza, seasonal, injectable, preservative free 10/01/2014   • Influenza, seasonal, injectable, trivalent 10/01/2014        No family history on file.    REVIEW OF SYSTEMS:  Review of systems is negative for headache, blurred vision, hearing issue, cough, fever, chills, chest pain, shortness of breath, abdominal pain, melena, nausea/vomiting, edema, DM, seizure, urinary issues or anemia.    PHYSICAL EXAMINATION:  HEENT:  Head:  Normocephalic, atraumatic.  Ears, Nose, and Throat:  Moist mucosa. No  exudate, thrush, or erythema.  Presence of enough saliva in the mouth.  Does have some cheek bites on both sides and sometime bites.  But they are all healing and not having any sign of infection.  As discoloration of the teeth from the tobacco  Eyes:  Pink conjunctivae.  Anicteric sclerae.  Pupils are equal and reactive.  Extraocular eye movements intact.  NECK:  No jugular venous distention, bruit, neck or supraclavicular lymphadenopathy, or thyromegaly noted.  CARDIOVASCULAR:  Rate and rhythm regular; S1, S2 normal.  No gallop or murmur.  LUNGS:  Clear to auscultation bilaterally.  ABDOMEN:  Soft, nontender, nondistended.  No hepatosplenomegaly noted.  Good active bowel sounds.  GENITAL AND RECTAL:  Deferred.  LOWER EXTREMITIES:  No edema noted.  Good dorsalis pedis pulses palpable.  SKIN:  Warm and moist to touch.  No rash.  MUSCULOSKELETAL:  No limitation of range of movement of any of the joints of the upper and lower extremities.  Good muscle strength and tone present.  CRANIAL NERVES:  Alert and oriented x3.  No gross deficit of sensory or cranial nerves II through XII:  II:   Acuity and visual fields are normal.  III, IV, VI:  External ocular movements are normal, and there is no presence of any nystagmus or ptosis.  Pupils are of equal size, regularity, and react equally to the light and accommodation.  No evidence of divergent or convergent strabismus or diplopia when looking down or to either side.  V: Facial sensation is intact and equal with normal corneal reflex.  There is no deviation of the jaw or weakness of masseter or temporal muscles.  VII: Facial expression, nasolabial folds, forehead wrinkle are normal.  VIII: Eardrums are normal.  Hearing is intact. There is no evidence of nystagmus and cerebellar function is intact.  IX: Normal gag reflex.  X: No evidence of deviation of the soft palate or laryngeal paralysis.  Pulse rate is normal and regular.  XI: Shrug of shoulders is equal and  strong.  XII: No deviation of the protruded tongue.  No evidence of atrophy or fine fibrillation.  Finger-nose test is normal.  Gait is normal.    ASSESSMENT:    1. Routine general medical examination at a health care facility    2. Anorexia    3. Sore mouth        PLAN:    Will try Magic mouthwash and see if that helps out with his mouth issue.  I told him to try maybe nasal cannula for sleep apnea rather than mouth or face mask.  He is not taking cevimelin as it is making the things worse.  He got the eye drops for the dry eye and it is helping.  He is planning to come for the preop here so will do the lab test at that time.  He has a history of drinking so he wants liver function tested also.    No follow-ups on file.  Orders Placed This Encounter   • acamprosate (CAMPRAL EC) 333 MG tablet   • Artificial Tear Solution (GenTeal Tears) 0.1-0.2-0.3 % Solution   • cevimeline (EVOXAC) 30 MG capsule   • omega-3 acid ethyl esters (LOVAZA) 1 g capsule   • omeprazole (PriLOSEC) 40 MG capsule   • pilocarpine (SALAGEN) 5 MG tablet   • zolpidem (AMBIEN) 10 MG tablet   • AH Custom (40% BENADRYL/40% MAALOX/20% OF 1% VISCOUS XYLOCAINE)

## 2022-02-24 ENCOUNTER — HISTORICAL (OUTPATIENT)
Dept: ADMINISTRATIVE | Facility: HOSPITAL | Age: 87
End: 2022-02-24

## 2022-03-11 ENCOUNTER — HISTORICAL (OUTPATIENT)
Dept: ADMINISTRATIVE | Facility: HOSPITAL | Age: 87
End: 2022-03-11

## 2022-03-11 LAB
ABS NEUT (OLG): 9.24 (ref 2.1–9.2)
BASOPHILS # BLD AUTO: 0.1 10*3/UL (ref 0–0.2)
BASOPHILS NFR BLD AUTO: 1 %
EOSINOPHIL # BLD AUTO: 0.2 10*3/UL (ref 0–0.9)
EOSINOPHIL NFR BLD AUTO: 2 %
ERYTHROCYTE [DISTWIDTH] IN BLOOD BY AUTOMATED COUNT: 12.2 % (ref 11.5–17)
HCT VFR BLD AUTO: 43.8 % (ref 42–52)
HGB BLD-MCNC: 14.2 G/DL (ref 14–18)
LYMPHOCYTES # BLD AUTO: 1.4 10*3/UL (ref 0.6–4.6)
LYMPHOCYTES NFR BLD AUTO: 12 %
MANUAL DIFF? (OHS): NO
MCH RBC QN AUTO: 31.7 PG (ref 27–31)
MCHC RBC AUTO-ENTMCNC: 32.4 G/DL (ref 33–36)
MCV RBC AUTO: 97.8 FL (ref 80–94)
MONOCYTES # BLD AUTO: 1.3 10*3/UL (ref 0.1–1.3)
MONOCYTES NFR BLD AUTO: 11 %
NEUTROPHILS # BLD AUTO: 9.24 10*3/UL (ref 2.1–9.2)
NEUTROPHILS NFR BLD AUTO: 75 %
PLATELET # BLD AUTO: 271 10*3/UL (ref 130–400)
PMV BLD AUTO: 12.3 FL (ref 9.4–12.4)
RBC # BLD AUTO: 4.48 10*6/UL (ref 4.7–6.1)
WBC # SPEC AUTO: 12.4 10*3/UL (ref 4.5–11.5)

## 2022-03-12 ENCOUNTER — HISTORICAL (OUTPATIENT)
Dept: ADMINISTRATIVE | Facility: HOSPITAL | Age: 87
End: 2022-03-12

## 2022-03-12 LAB
ABS NEUT (OLG): 9.46 (ref 2.1–9.2)
ALBUMIN SERPL-MCNC: 3.4 G/DL (ref 3.4–4.8)
ALBUMIN/GLOB SERPL: 1.2 {RATIO} (ref 1.1–2)
ALP SERPL-CCNC: 88 U/L (ref 40–150)
ALT SERPL-CCNC: 18 U/L (ref 0–55)
AST SERPL-CCNC: 15 U/L (ref 5–34)
BASOPHILS # BLD AUTO: 0 10*3/UL (ref 0–0.2)
BASOPHILS NFR BLD AUTO: 0 %
BILIRUB SERPL-MCNC: 1.1 MG/DL
BILIRUBIN DIRECT+TOT PNL SERPL-MCNC: 0.5 (ref 0–0.5)
BILIRUBIN DIRECT+TOT PNL SERPL-MCNC: 0.6 (ref 0–0.8)
BUN SERPL-MCNC: 17.5 MG/DL (ref 8.4–25.7)
CALCIUM SERPL-MCNC: 9 MG/DL (ref 8.7–10.5)
CHLORIDE SERPL-SCNC: 107 MMOL/L (ref 98–107)
CO2 SERPL-SCNC: 25 MMOL/L (ref 23–31)
CREAT SERPL-MCNC: 0.88 MG/DL (ref 0.73–1.18)
EOSINOPHIL # BLD AUTO: 0.1 10*3/UL (ref 0–0.9)
EOSINOPHIL NFR BLD AUTO: 1 %
ERYTHROCYTE [DISTWIDTH] IN BLOOD BY AUTOMATED COUNT: 12.2 % (ref 11.5–17)
GLOBULIN SER-MCNC: 2.9 G/DL (ref 2.4–3.5)
GLUCOSE SERPL-MCNC: 144 MG/DL (ref 82–115)
HCT VFR BLD AUTO: 43.9 % (ref 42–52)
HEMOLYSIS INTERF INDEX SERPL-ACNC: 5
HGB BLD-MCNC: 14.3 G/DL (ref 14–18)
ICTERIC INTERF INDEX SERPL-ACNC: 1
LIPEMIC INTERF INDEX SERPL-ACNC: <0
LYMPHOCYTES # BLD AUTO: 1.1 10*3/UL (ref 0.6–4.6)
LYMPHOCYTES NFR BLD AUTO: 9 %
MANUAL DIFF? (OHS): NO
MCH RBC QN AUTO: 31.6 PG (ref 27–31)
MCHC RBC AUTO-ENTMCNC: 32.6 G/DL (ref 33–36)
MCV RBC AUTO: 97.1 FL (ref 80–94)
MONOCYTES # BLD AUTO: 1.6 10*3/UL (ref 0.1–1.3)
MONOCYTES NFR BLD AUTO: 13 %
NEUTROPHILS # BLD AUTO: 9.46 10*3/UL (ref 2.1–9.2)
NEUTROPHILS NFR BLD AUTO: 76 %
PLATELET # BLD AUTO: 280 10*3/UL (ref 130–400)
PMV BLD AUTO: 11.9 FL (ref 9.4–12.4)
POTASSIUM SERPL-SCNC: 4 MMOL/L (ref 3.5–5.1)
PROT SERPL-MCNC: 6.3 G/DL (ref 5.8–7.6)
RBC # BLD AUTO: 4.52 10*6/UL (ref 4.7–6.1)
SODIUM SERPL-SCNC: 141 MMOL/L (ref 136–145)
WBC # SPEC AUTO: 12.4 10*3/UL (ref 4.5–11.5)

## 2022-03-31 ENCOUNTER — HISTORICAL (OUTPATIENT)
Dept: ADMINISTRATIVE | Facility: HOSPITAL | Age: 87
End: 2022-03-31

## 2022-03-31 LAB
ABS NEUT (OLG): 6.3 (ref 2.1–9.2)
ALBUMIN SERPL-MCNC: 3.6 G/DL (ref 3.4–4.8)
ALBUMIN/GLOB SERPL: 1.3 {RATIO} (ref 1.1–2)
ALP SERPL-CCNC: 96 U/L (ref 40–150)
ALT SERPL-CCNC: 13 U/L (ref 0–55)
AST SERPL-CCNC: 15 U/L (ref 5–34)
BASOPHILS # BLD AUTO: 0.1 10*3/UL (ref 0–0.2)
BASOPHILS NFR BLD AUTO: 1 %
BILIRUB SERPL-MCNC: 1 MG/DL
BILIRUBIN DIRECT+TOT PNL SERPL-MCNC: 0.5 (ref 0–0.5)
BILIRUBIN DIRECT+TOT PNL SERPL-MCNC: 0.5 (ref 0–0.8)
BUN SERPL-MCNC: 16.3 MG/DL (ref 8.4–25.7)
CALCIUM SERPL-MCNC: 9.5 MG/DL (ref 8.7–10.5)
CHLORIDE SERPL-SCNC: 106 MMOL/L (ref 98–107)
CHOLEST SERPL-MCNC: 112 MG/DL
CHOLEST/HDLC SERPL: 3 {RATIO} (ref 0–5)
CO2 SERPL-SCNC: 30 MMOL/L (ref 23–31)
CREAT SERPL-MCNC: 0.97 MG/DL (ref 0.73–1.18)
EOSINOPHIL # BLD AUTO: 0.3 10*3/UL (ref 0–0.9)
EOSINOPHIL NFR BLD AUTO: 3 %
ERYTHROCYTE [DISTWIDTH] IN BLOOD BY AUTOMATED COUNT: 12.6 % (ref 11.5–17)
EST. AVERAGE GLUCOSE BLD GHB EST-MCNC: 114 MG/DL
GLOBULIN SER-MCNC: 2.7 G/DL (ref 2.4–3.5)
GLUCOSE SERPL-MCNC: 113 MG/DL (ref 82–115)
HBA1C MFR BLD: 5.6 %
HCT VFR BLD AUTO: 44.6 % (ref 42–52)
HDLC SERPL-MCNC: 43 MG/DL (ref 35–60)
HEMOLYSIS INTERF INDEX SERPL-ACNC: 1
HGB BLD-MCNC: 14.4 G/DL (ref 14–18)
ICTERIC INTERF INDEX SERPL-ACNC: 1
LDLC SERPL CALC-MCNC: 58 MG/DL (ref 50–140)
LIPEMIC INTERF INDEX SERPL-ACNC: <0
LYMPHOCYTES # BLD AUTO: 1.2 10*3/UL (ref 0.6–4.6)
LYMPHOCYTES NFR BLD AUTO: 13 %
MANUAL DIFF? (OHS): NO
MCH RBC QN AUTO: 31.4 PG (ref 27–31)
MCHC RBC AUTO-ENTMCNC: 32.3 G/DL (ref 33–36)
MCV RBC AUTO: 97.4 FL (ref 80–94)
MONOCYTES # BLD AUTO: 1.2 10*3/UL (ref 0.1–1.3)
MONOCYTES NFR BLD AUTO: 13 %
NEUTROPHILS # BLD AUTO: 6.3 10*3/UL (ref 2.1–9.2)
NEUTROPHILS NFR BLD AUTO: 70 %
PLATELET # BLD AUTO: 196 10*3/UL (ref 130–400)
PMV BLD AUTO: 12 FL (ref 9.4–12.4)
POTASSIUM SERPL-SCNC: 5.7 MMOL/L (ref 3.5–5.1)
PROT SERPL-MCNC: 6.3 G/DL (ref 5.8–7.6)
RBC # BLD AUTO: 4.58 10*6/UL (ref 4.7–6.1)
SODIUM SERPL-SCNC: 142 MMOL/L (ref 136–145)
TRIGL SERPL-MCNC: 55 MG/DL (ref 34–140)
TSH SERPL-ACNC: 3.41 M[IU]/L (ref 0.35–4.94)
VLDLC SERPL CALC-MCNC: 11 MG/DL
WBC # SPEC AUTO: 9 10*3/UL (ref 4.5–11.5)

## 2022-04-05 ENCOUNTER — HISTORICAL (OUTPATIENT)
Dept: ADMINISTRATIVE | Facility: HOSPITAL | Age: 87
End: 2022-04-05

## 2022-04-05 LAB
BUN SERPL-MCNC: 16.2 MG/DL (ref 8.4–25.7)
CALCIUM SERPL-MCNC: 8.9 MG/DL (ref 8.7–10.5)
CHLORIDE SERPL-SCNC: 107 MMOL/L (ref 98–107)
CO2 SERPL-SCNC: 24 MMOL/L (ref 23–31)
CREAT SERPL-MCNC: 0.99 MG/DL (ref 0.73–1.18)
CREAT/UREA NIT SERPL: 16
GLUCOSE SERPL-MCNC: 125 MG/DL (ref 82–115)
HEMOLYSIS INTERF INDEX SERPL-ACNC: 6
ICTERIC INTERF INDEX SERPL-ACNC: 1
LIPEMIC INTERF INDEX SERPL-ACNC: 0
POTASSIUM SERPL-SCNC: 4.4 MMOL/L (ref 3.5–5.1)
SODIUM SERPL-SCNC: 141 MMOL/L (ref 136–145)

## 2022-04-11 ENCOUNTER — HISTORICAL (OUTPATIENT)
Dept: ADMINISTRATIVE | Facility: HOSPITAL | Age: 87
End: 2022-04-11
Payer: MEDICARE

## 2022-04-29 VITALS
WEIGHT: 213 LBS | DIASTOLIC BLOOD PRESSURE: 68 MMHG | HEIGHT: 74 IN | BODY MASS INDEX: 27.34 KG/M2 | SYSTOLIC BLOOD PRESSURE: 108 MMHG | OXYGEN SATURATION: 97 %

## 2022-07-15 PROCEDURE — G0179 PR HOME HEALTH MD RECERTIFICATION: ICD-10-PCS | Mod: ,,, | Performed by: INTERNAL MEDICINE

## 2022-07-15 PROCEDURE — G0179 MD RECERTIFICATION HHA PT: HCPCS | Mod: ,,, | Performed by: INTERNAL MEDICINE

## 2022-09-06 ENCOUNTER — EXTERNAL HOME HEALTH (OUTPATIENT)
Dept: HOME HEALTH SERVICES | Facility: HOSPITAL | Age: 87
End: 2022-09-06
Payer: MEDICARE

## 2022-10-27 ENCOUNTER — CLINICAL SUPPORT (OUTPATIENT)
Dept: URGENT CARE | Facility: CLINIC | Age: 87
End: 2022-10-27
Payer: MEDICARE

## 2022-10-27 VITALS — RESPIRATION RATE: 18 BRPM

## 2022-10-27 PROCEDURE — G0008 ADMIN INFLUENZA VIRUS VAC: HCPCS | Mod: ,,, | Performed by: FAMILY MEDICINE

## 2022-10-27 PROCEDURE — G0008 FLU VACCINE - QUADRIVALENT - ADJUVANTED: ICD-10-PCS | Mod: ,,, | Performed by: FAMILY MEDICINE

## 2022-10-27 PROCEDURE — 90694 FLU VACCINE - QUADRIVALENT - ADJUVANTED: ICD-10-PCS | Mod: ,,, | Performed by: FAMILY MEDICINE

## 2022-10-27 PROCEDURE — 90694 VACC AIIV4 NO PRSRV 0.5ML IM: CPT | Mod: ,,, | Performed by: FAMILY MEDICINE

## 2022-10-27 NOTE — PROGRESS NOTES
Subjective:       Patient ID: Wolfgang Duval is a 89 y.o. male.    Vitals:  respiration is 18.     Chief Complaint: No chief complaint on file.    Patient presented to clinic for influenza vaccination. Vaccine information sheet given to patient along with written consent form. After written consent was obtained, patient given influenza vaccine in right deltoid. Patient tolerated vaccine well.    ROS    Objective:      Physical Exam      Assessment:       No diagnosis found.      Plan:         There are no diagnoses linked to this encounter.

## 2022-12-29 ENCOUNTER — HOSPITAL ENCOUNTER (INPATIENT)
Facility: HOSPITAL | Age: 87
LOS: 8 days | Discharge: NURSING, REHAB OR LONG-TERM CARE | DRG: 178 | End: 2023-01-06
Attending: EMERGENCY MEDICINE | Admitting: INTERNAL MEDICINE
Payer: MEDICARE

## 2022-12-29 DIAGNOSIS — U07.1 COVID-19: ICD-10-CM

## 2022-12-29 DIAGNOSIS — R05.9 COUGH: ICD-10-CM

## 2022-12-29 DIAGNOSIS — R53.1 GENERALIZED WEAKNESS: Primary | ICD-10-CM

## 2022-12-29 PROBLEM — F02.80 ALZHEIMER'S DISEASE WITH LATE ONSET: Status: ACTIVE | Noted: 2022-03-10

## 2022-12-29 PROBLEM — G30.1 ALZHEIMER'S DISEASE WITH LATE ONSET: Status: ACTIVE | Noted: 2022-03-10

## 2022-12-29 PROBLEM — I10 ESSENTIAL (PRIMARY) HYPERTENSION: Status: ACTIVE | Noted: 2022-03-10

## 2022-12-29 PROBLEM — I25.10 CORONARY ARTERY DISEASE: Status: ACTIVE | Noted: 2022-12-29

## 2022-12-29 PROBLEM — E78.5 HYPERLIPIDEMIA, UNSPECIFIED: Status: ACTIVE | Noted: 2022-03-10

## 2022-12-29 LAB
ABS NEUT (OLG): 7.63 X10(3)/MCL (ref 2.1–9.2)
ALBUMIN SERPL-MCNC: 3.4 G/DL (ref 3.4–4.8)
ALBUMIN/GLOB SERPL: 1.4 RATIO (ref 1.1–2)
ALP SERPL-CCNC: 98 UNIT/L (ref 40–150)
ALT SERPL-CCNC: 13 UNIT/L (ref 0–55)
APPEARANCE UR: CLEAR
AST SERPL-CCNC: 16 UNIT/L (ref 5–34)
BACTERIA #/AREA URNS AUTO: ABNORMAL /HPF
BILIRUB UR QL STRIP.AUTO: NEGATIVE MG/DL
BILIRUBIN DIRECT+TOT PNL SERPL-MCNC: 1 MG/DL
BNP BLD-MCNC: 213 PG/ML
BUN SERPL-MCNC: 17.3 MG/DL (ref 8.4–25.7)
CALCIUM SERPL-MCNC: 8.7 MG/DL (ref 8.8–10)
CHLORIDE SERPL-SCNC: 107 MMOL/L (ref 98–111)
CO2 SERPL-SCNC: 21 MMOL/L (ref 23–31)
COLOR UR AUTO: YELLOW
CREAT SERPL-MCNC: 1.01 MG/DL (ref 0.73–1.18)
ERYTHROCYTE [DISTWIDTH] IN BLOOD BY AUTOMATED COUNT: 12.5 % (ref 11.6–14.4)
FLUAV AG UPPER RESP QL IA.RAPID: NOT DETECTED
FLUBV AG UPPER RESP QL IA.RAPID: NOT DETECTED
GFR SERPLBLD CREATININE-BSD FMLA CKD-EPI: >60 MLS/MIN/1.73/M2
GLOBULIN SER-MCNC: 2.5 GM/DL (ref 2.4–3.5)
GLUCOSE SERPL-MCNC: 143 MG/DL (ref 75–121)
GLUCOSE UR QL STRIP.AUTO: NEGATIVE MG/DL
HCT VFR BLD AUTO: 40.7 % (ref 42–52)
HGB BLD-MCNC: 13.5 GM/DL (ref 14–18)
IMM GRANULOCYTES # BLD AUTO: 0.03 X10(3)/MCL (ref 0–0.04)
IMM GRANULOCYTES NFR BLD AUTO: 0.4 %
INSTRUMENT WBC (OLG): 8.2 X10(3)/MCL
KETONES UR QL STRIP.AUTO: NEGATIVE MG/DL
LEUKOCYTE ESTERASE UR QL STRIP.AUTO: NEGATIVE UNIT/L
LYMPHOCYTES NFR BLD MANUAL: 0.25 X10(3)/MCL
LYMPHOCYTES NFR BLD MANUAL: 3 %
MAGNESIUM SERPL-MCNC: 1.9 MG/DL (ref 1.6–2.6)
MCH RBC QN AUTO: 31.7 PG
MCHC RBC AUTO-ENTMCNC: 33.2 MG/DL (ref 33–36)
MCV RBC AUTO: 95.5 FL (ref 80–94)
MONOCYTES NFR BLD MANUAL: 0.33 X10(3)/MCL (ref 0.1–1.3)
MONOCYTES NFR BLD MANUAL: 4 %
NEUTROPHILS NFR BLD MANUAL: 93 %
NITRITE UR QL STRIP.AUTO: NEGATIVE
NRBC BLD AUTO-RTO: 0 % (ref 0–1)
PH UR STRIP.AUTO: 7 [PH]
PLATELET # BLD AUTO: 180 X10(3)/MCL (ref 140–371)
PLATELET # BLD EST: NORMAL 10*3/UL
PMV BLD AUTO: 11.7 FL (ref 9.4–12.4)
POTASSIUM SERPL-SCNC: 3.7 MMOL/L (ref 3.5–5.1)
PROT SERPL-MCNC: 5.9 GM/DL (ref 5.8–7.6)
PROT UR QL STRIP.AUTO: NEGATIVE MG/DL
RBC # BLD AUTO: 4.26 X10(6)/MCL (ref 4.7–6.1)
RBC #/AREA URNS AUTO: <5 /HPF
RBC MORPH BLD: NORMAL
RBC UR QL AUTO: NEGATIVE UNIT/L
SARS-COV-2 RNA RESP QL NAA+PROBE: DETECTED
SODIUM SERPL-SCNC: 137 MMOL/L (ref 132–146)
SP GR UR STRIP.AUTO: 1.02 (ref 1–1.03)
SQUAMOUS #/AREA URNS AUTO: 7 /HPF
TROPONIN I SERPL-MCNC: 0.03 NG/ML (ref 0–0.04)
UROBILINOGEN UR STRIP-ACNC: 1 MG/DL
WBC # SPEC AUTO: 8.2 X10(3)/MCL (ref 4.5–11.5)
WBC #/AREA URNS AUTO: <5 /HPF

## 2022-12-29 PROCEDURE — 11000001 HC ACUTE MED/SURG PRIVATE ROOM

## 2022-12-29 PROCEDURE — 85025 COMPLETE CBC W/AUTO DIFF WBC: CPT | Performed by: EMERGENCY MEDICINE

## 2022-12-29 PROCEDURE — 96365 THER/PROPH/DIAG IV INF INIT: CPT

## 2022-12-29 PROCEDURE — 83880 ASSAY OF NATRIURETIC PEPTIDE: CPT | Performed by: EMERGENCY MEDICINE

## 2022-12-29 PROCEDURE — 25000003 PHARM REV CODE 250: Performed by: EMERGENCY MEDICINE

## 2022-12-29 PROCEDURE — 99223 PR INITIAL HOSPITAL CARE,LEVL III: ICD-10-PCS | Mod: AI,CR,, | Performed by: INTERNAL MEDICINE

## 2022-12-29 PROCEDURE — 85027 COMPLETE CBC AUTOMATED: CPT | Performed by: EMERGENCY MEDICINE

## 2022-12-29 PROCEDURE — 63600175 PHARM REV CODE 636 W HCPCS: Performed by: INTERNAL MEDICINE

## 2022-12-29 PROCEDURE — 27000207 HC ISOLATION

## 2022-12-29 PROCEDURE — 93010 EKG 12-LEAD: ICD-10-PCS | Mod: ,,, | Performed by: INTERNAL MEDICINE

## 2022-12-29 PROCEDURE — 83735 ASSAY OF MAGNESIUM: CPT | Performed by: EMERGENCY MEDICINE

## 2022-12-29 PROCEDURE — 80053 COMPREHEN METABOLIC PANEL: CPT | Performed by: EMERGENCY MEDICINE

## 2022-12-29 PROCEDURE — 0240U COVID/FLU A&B PCR: CPT | Performed by: EMERGENCY MEDICINE

## 2022-12-29 PROCEDURE — 93010 ELECTROCARDIOGRAM REPORT: CPT | Mod: ,,, | Performed by: INTERNAL MEDICINE

## 2022-12-29 PROCEDURE — 84484 ASSAY OF TROPONIN QUANT: CPT | Performed by: EMERGENCY MEDICINE

## 2022-12-29 PROCEDURE — 96361 HYDRATE IV INFUSION ADD-ON: CPT

## 2022-12-29 PROCEDURE — 96375 TX/PRO/DX INJ NEW DRUG ADDON: CPT

## 2022-12-29 PROCEDURE — 99285 EMERGENCY DEPT VISIT HI MDM: CPT | Mod: 25

## 2022-12-29 PROCEDURE — 93005 ELECTROCARDIOGRAM TRACING: CPT

## 2022-12-29 PROCEDURE — 81001 URINALYSIS AUTO W/SCOPE: CPT | Performed by: EMERGENCY MEDICINE

## 2022-12-29 PROCEDURE — 63600175 PHARM REV CODE 636 W HCPCS: Performed by: EMERGENCY MEDICINE

## 2022-12-29 PROCEDURE — 99223 1ST HOSP IP/OBS HIGH 75: CPT | Mod: AI,CR,, | Performed by: INTERNAL MEDICINE

## 2022-12-29 RX ORDER — HYDROXYZINE 50 MG/ML
50 INJECTION, SOLUTION INTRAMUSCULAR
Status: DISPENSED | OUTPATIENT
Start: 2022-12-29 | End: 2022-12-30

## 2022-12-29 RX ORDER — DEXAMETHASONE SODIUM PHOSPHATE 4 MG/ML
8 INJECTION, SOLUTION INTRA-ARTICULAR; INTRALESIONAL; INTRAMUSCULAR; INTRAVENOUS; SOFT TISSUE
Status: COMPLETED | OUTPATIENT
Start: 2022-12-29 | End: 2022-12-29

## 2022-12-29 RX ORDER — HALOPERIDOL 5 MG/ML
5 INJECTION INTRAMUSCULAR
Status: COMPLETED | OUTPATIENT
Start: 2022-12-29 | End: 2022-12-29

## 2022-12-29 RX ORDER — HALOPERIDOL 5 MG/ML
INJECTION INTRAMUSCULAR
Status: DISPENSED
Start: 2022-12-29 | End: 2022-12-30

## 2022-12-29 RX ORDER — SODIUM CHLORIDE 0.9 % (FLUSH) 0.9 %
10 SYRINGE (ML) INJECTION
Status: DISCONTINUED | OUTPATIENT
Start: 2022-12-29 | End: 2023-01-06 | Stop reason: HOSPADM

## 2022-12-29 RX ORDER — DIPHENHYDRAMINE HYDROCHLORIDE 50 MG/ML
25 INJECTION INTRAMUSCULAR; INTRAVENOUS
Status: COMPLETED | OUTPATIENT
Start: 2022-12-29 | End: 2022-12-29

## 2022-12-29 RX ORDER — ONDANSETRON 2 MG/ML
4 INJECTION INTRAMUSCULAR; INTRAVENOUS EVERY 8 HOURS PRN
Status: DISCONTINUED | OUTPATIENT
Start: 2022-12-29 | End: 2023-01-06 | Stop reason: HOSPADM

## 2022-12-29 RX ORDER — SODIUM CHLORIDE 9 MG/ML
1000 INJECTION, SOLUTION INTRAVENOUS
Status: COMPLETED | OUTPATIENT
Start: 2022-12-29 | End: 2022-12-29

## 2022-12-29 RX ORDER — ACETAMINOPHEN 325 MG/1
650 TABLET ORAL EVERY 8 HOURS PRN
Status: DISCONTINUED | OUTPATIENT
Start: 2022-12-29 | End: 2023-01-06 | Stop reason: HOSPADM

## 2022-12-29 RX ORDER — TALC
6 POWDER (GRAM) TOPICAL NIGHTLY PRN
Status: DISCONTINUED | OUTPATIENT
Start: 2022-12-29 | End: 2023-01-06 | Stop reason: HOSPADM

## 2022-12-29 RX ADMIN — DIPHENHYDRAMINE HYDROCHLORIDE 25 MG: 50 INJECTION INTRAMUSCULAR; INTRAVENOUS at 05:12

## 2022-12-29 RX ADMIN — HALOPERIDOL LACTATE 5 MG: 5 INJECTION, SOLUTION INTRAMUSCULAR at 01:12

## 2022-12-29 RX ADMIN — SODIUM CHLORIDE 1000 ML: 9 INJECTION, SOLUTION INTRAVENOUS at 10:12

## 2022-12-29 RX ADMIN — DEXAMETHASONE SODIUM PHOSPHATE 8 MG: 4 INJECTION, SOLUTION INTRA-ARTICULAR; INTRALESIONAL; INTRAMUSCULAR; INTRAVENOUS; SOFT TISSUE at 08:12

## 2022-12-29 RX ADMIN — REMDESIVIR 200 MG: 100 INJECTION, POWDER, LYOPHILIZED, FOR SOLUTION INTRAVENOUS at 09:12

## 2022-12-29 RX ADMIN — HALOPERIDOL LACTATE 5 MG: 5 INJECTION, SOLUTION INTRAMUSCULAR at 02:12

## 2022-12-29 RX ADMIN — SODIUM CHLORIDE 1000 ML: 9 INJECTION, SOLUTION INTRAVENOUS at 06:12

## 2022-12-29 NOTE — ED PROVIDER NOTES
Encounter Date: 12/29/2022    SCRIBE #1 NOTE: I, Rudy Parrish, am scribing for, and in the presence of,  Dr. Castillo. I have scribed the following portions of the note - the EKG reading. Other sections scribed: HPI, ROS, Physical Exam, MDM, Attending.     History     Chief Complaint   Patient presents with    Weakness     Spouse diagnosed w/ covid 2 days ago, pt w/ productive cough and increased weakness this am     89 y/o CM with history of dementia presents to ED via EMS for nonproductive cough and worsening generalized weakness onset last night.  This morning, pt's wife could not get him off the toilet because he was too weak.  Wife was diagnosed with COVID 2 days ago and believes pt may have it as well.  EMS states pt's wife denied any recent falls.  Pt does not know his medical history or PCP.    The history is provided by the patient and the EMS personnel. History limited by: dementia.   Cough  This is a new problem. Illness onset: last night. The problem has been unchanged. The cough is Non-productive.   Review of patient's allergies indicates:  No Known Allergies  No past medical history on file.  No past surgical history on file.  No family history on file.     Review of Systems   Unable to perform ROS: Dementia   Respiratory:  Positive for cough.      Physical Exam     Initial Vitals [12/29/22 0556]   BP Pulse Resp Temp SpO2   116/75 87 20 99 °F (37.2 °C) (!) 94 %      MAP       --         Physical Exam    Nursing note and vitals reviewed.  Constitutional: He appears well-developed. No distress.   Pleasant; nontoxic appearing   HENT:   Head: Normocephalic and atraumatic.   Mouth/Throat: Oropharynx is clear and moist.   Eyes: Conjunctivae and EOM are normal. Pupils are equal, round, and reactive to light.   Neck: Neck supple.   Cardiovascular:  Normal rate and regular rhythm.           No murmur heard.  Pulmonary/Chest: Breath sounds normal. No respiratory distress. He exhibits no tenderness.   Abdominal:  Abdomen is soft. Bowel sounds are normal. He exhibits no distension. There is no abdominal tenderness.   Musculoskeletal:         General: No edema. Normal range of motion.      Cervical back: Neck supple.      Lumbar back: Normal. No tenderness. Normal range of motion.     Neurological: He is alert and oriented to person, place, and time. He has normal strength. No cranial nerve deficit or sensory deficit. GCS eye subscore is 4. GCS verbal subscore is 4. GCS motor subscore is 6.   Moves all extremities well; no slurred speech   Psychiatric: He has a normal mood and affect. Judgment normal.       ED Course   Procedures  Labs Reviewed   COVID/FLU A&B PCR - Abnormal; Notable for the following components:       Result Value    SARS-CoV-2 PCR Detected (*)     All other components within normal limits    Narrative:     The Xpert Xpress SARS-CoV-2/FLU/RSV plus is a rapid, multiplexed real-time PCR test intended for the simultaneous qualitative detection and differentiation of SARS-CoV-2, Influenza A, Influenza B, and respiratory syncytial virus (RSV) viral RNA in either nasopharyngeal swab or nasal swab specimens.         COMPREHENSIVE METABOLIC PANEL - Abnormal; Notable for the following components:    Carbon Dioxide 21 (*)     Glucose Level 143 (*)     Calcium Level Total 8.7 (*)     All other components within normal limits   B-TYPE NATRIURETIC PEPTIDE - Abnormal; Notable for the following components:    Natriuretic Peptide 213.0 (*)     All other components within normal limits   CBC WITH DIFFERENTIAL - Abnormal; Notable for the following components:    RBC 4.26 (*)     Hgb 13.5 (*)     Hct 40.7 (*)     MCV 95.5 (*)     All other components within normal limits   MANUAL DIFFERENTIAL - Abnormal; Notable for the following components:    Abs Lymp 0.246 (*)     All other components within normal limits   MAGNESIUM - Normal   TROPONIN I - Normal   CBC W/ AUTO DIFFERENTIAL    Narrative:     The following orders were created  for panel order CBC auto differential.  Procedure                               Abnormality         Status                     ---------                               -----------         ------                     CBC with Differential[[id number]]        Abnormal            Final result               Manual Differential[[id number]]          Abnormal            Final result                 Please view results for these tests on the individual orders.   URINALYSIS, REFLEX TO URINE CULTURE     EKG Readings: (Independently Interpreted)   Initial Reading: No STEMI. Rhythm: Normal Sinus Rhythm. Heart Rate: 77. Ectopy: No Ectopy. Conduction: 1st Degree AV Block. ST Segments: Normal ST Segments. T Waves Flipped: V3 and V4. Axis: Normal.   0604     Imaging Results              CT Head Without Contrast (Final result)  Result time 12/29/22 08:48:54      Final result by Dinesh Brown MD (12/29/22 08:48:54)                   Impression:      No acute intracranial findings identified.      Electronically signed by: Dinesh Brown  Date:    12/29/2022  Time:    08:48               Narrative:    EXAMINATION:  CT HEAD WITHOUT CONTRAST    CLINICAL HISTORY:  Head trauma, moderate-severe;    TECHNIQUE:  Sequential axial images were performed of the brain without contrast.    Dose product length of 1026 mGycm. Automated exposure control was utilized to minimize radiation dose.    COMPARISON:  None available.    FINDINGS:  There is no intracranial mass effect, midline shift, hydrocephalus or hemorrhage. There is no sulcal effacement or low attenuation changes to suggest recent large vessel territory infarction. Chronic appearing periventricular and subcortical white matter low attenuation changes are present and are consistent with chronic microangiopathic ischemia. The ventricular system and sulcal markings prominence is consistent with atrophy. There is no acute extra axial fluid collection.  There is no acute depressed skull fracture.   Visualized paranasal sinuses are clear without mucosal thickening, polypoidal abnormality or air-fluid levels. Mastoid air cells aeration is optimal.                                       X-Ray Chest 1 View (Final result)  Result time 12/29/22 06:39:00      Final result by Katie Goss MD (12/29/22 06:39:00)                   Impression:      No acute cardiopulmonary abnormality.      Electronically signed by: Katie Goss  Date:    12/29/2022  Time:    06:39               Narrative:    EXAMINATION:  XR CHEST 1 VIEW    CLINICAL HISTORY:  Cough, unspecified    TECHNIQUE:  Single frontal view of the chest was performed.    COMPARISON:  None    FINDINGS:  LINES AND TUBES: EKG/telemetry leads overlie the chest.    MEDIASTINUM AND ÁLVARO: Cardiac silhouette is at the upper limits of normal. Aortic atherosclerosis.    LUNGS: No lobar consolidation. No edema.    PLEURA:No pleural effusion. No pneumothorax.    OTHER: No acute osseous abnormality.                                       Medications   remdesivir 200 mg in sodium chloride 0.9% 250 mL infusion (0 mg Intravenous Stopped 12/29/22 0945)     Followed by   remdesivir 100 mg in sodium chloride 0.9% 100 mL infusion (has no administration in time range)   sodium chloride 0.9% flush 10 mL (has no administration in time range)   melatonin tablet 6 mg (has no administration in time range)   ondansetron injection 4 mg (has no administration in time range)   acetaminophen tablet 650 mg (has no administration in time range)   0.9%  NaCl infusion (has no administration in time range)   sodium chloride 0.9% bolus 1,000 mL 1,000 mL (0 mLs Intravenous Stopped 12/29/22 0723)   dexAMETHasone injection 8 mg (8 mg Intravenous Given 12/29/22 0815)     Medical Decision Making:   Clinical Tests:   Lab Tests: Ordered and Reviewed  Radiological Study: Ordered and Reviewed  Medical Tests: Ordered and Reviewed        Scribe Attestation:   Scribe #1: I performed the above scribed  service and the documentation accurately describes the services I performed. I attest to the accuracy of the note.    Attending Attestation:           Physician Attestation for Scribe:  Physician Attestation Statement for Scribe #1: I, reviewed documentation, as scribed by Rudy Parrish in my presence, and it is both accurate and complete.           ED Course as of 12/29/22 1016   u Dec 29, 2022   0804 Pt on floor, nurses notified [CY]   0819 Pt had fall, unwitnessed sitting on floor when found, no obvious head trauma at present, no ml cs tenderness or stepoff [NL]   0925 Pt resting comfortably [CY]      ED Course User Index  [CY] Rudy Parrish  [NL] Landon Castillo MD                 Clinical Impression:   Final diagnoses:  [R05.9] Cough  [U07.1] COVID-19  [R53.1] Generalized weakness (Primary)        ED Disposition Condition    Admit Stable                Landon Castillo MD  12/29/22 1016     Never smoker

## 2022-12-29 NOTE — Clinical Note
Diagnosis: COVID-19 [[id number]]   Admitting Provider:: HARLEEN TRIPATHI II [[id number]]   Future Attending Provider: HARLEEN TRIPATHI II [[id number]]   Reason for IP Medical Treatment  (Clinical interventions that can only be accomplished in the IP setting? ) :: hypoxemia   Estimated Length of Stay:: 2 midnights   I certify that Inpatient services for greater than or equal to 2 midnights are medically necessary:: Yes   Plans for Post-Acute care--if anticipated (pick the single best option):: A. No post acute care anticipated at this time

## 2022-12-30 PROCEDURE — 27000207 HC ISOLATION

## 2022-12-30 PROCEDURE — 99232 PR SUBSEQUENT HOSPITAL CARE,LEVL II: ICD-10-PCS | Mod: CR,,, | Performed by: INTERNAL MEDICINE

## 2022-12-30 PROCEDURE — 51798 US URINE CAPACITY MEASURE: CPT

## 2022-12-30 PROCEDURE — 99232 SBSQ HOSP IP/OBS MODERATE 35: CPT | Mod: CR,,, | Performed by: INTERNAL MEDICINE

## 2022-12-30 PROCEDURE — 11000001 HC ACUTE MED/SURG PRIVATE ROOM

## 2022-12-30 PROCEDURE — 21400001 HC TELEMETRY ROOM

## 2022-12-30 PROCEDURE — 63600175 PHARM REV CODE 636 W HCPCS: Mod: TB | Performed by: INTERNAL MEDICINE

## 2022-12-30 PROCEDURE — 25000003 PHARM REV CODE 250: Performed by: INTERNAL MEDICINE

## 2022-12-30 RX ORDER — CLOPIDOGREL BISULFATE 75 MG/1
75 TABLET ORAL DAILY
COMMUNITY
Start: 2022-12-14

## 2022-12-30 RX ORDER — CARVEDILOL 6.25 MG/1
3.12 TABLET ORAL 2 TIMES DAILY
COMMUNITY
Start: 2022-03-10 | End: 2023-11-29

## 2022-12-30 RX ORDER — LISINOPRIL 10 MG/1
10 TABLET ORAL DAILY
COMMUNITY
Start: 2022-12-14 | End: 2023-04-10

## 2022-12-30 RX ORDER — NAPROXEN SODIUM 220 MG/1
1 TABLET, FILM COATED ORAL DAILY
COMMUNITY
Start: 2022-03-11 | End: 2023-11-29

## 2022-12-30 RX ORDER — ATORVASTATIN CALCIUM 20 MG/1
20 TABLET, FILM COATED ORAL NIGHTLY
COMMUNITY
Start: 2022-12-14

## 2022-12-30 RX ORDER — TAMSULOSIN HYDROCHLORIDE 0.4 MG/1
1 CAPSULE ORAL DAILY
COMMUNITY
Start: 2022-12-14

## 2022-12-30 RX ADMIN — REMDESIVIR 100 MG: 100 INJECTION, POWDER, LYOPHILIZED, FOR SOLUTION INTRAVENOUS at 09:12

## 2022-12-30 NOTE — ASSESSMENT & PLAN NOTE
Does have some agitation status post Haldol  Will add IV Benadryl   Continue with current therapy.

## 2022-12-30 NOTE — SUBJECTIVE & OBJECTIVE
Interval History:  Patient was admitted with acute hypoxic respiratory failure secondary to COVID-19 also some generalized weakness and deconditioning.  Required restraints last night   Low more awake and alert today  Currently receiving remdesivir.    Vital signs are stable afebrile    Review of Systems   Constitutional: Negative.    HENT: Negative.     Eyes: Negative.    Respiratory:  Negative for cough, chest tightness and shortness of breath.    Cardiovascular:  Negative for chest pain and leg swelling.   Gastrointestinal:  Negative for abdominal pain, diarrhea, nausea and vomiting.   Endocrine: Negative.    Genitourinary: Negative.    Musculoskeletal: Negative.    Skin: Negative.    Allergic/Immunologic: Negative.    Neurological:  Positive for weakness. Negative for headaches.   Hematological: Negative.    Psychiatric/Behavioral: Negative.     Objective:     Vital Signs (Most Recent):  Temp: 98.3 °F (36.8 °C) (12/30/22 1307)  Pulse: 66 (12/30/22 1307)  Resp: 18 (12/30/22 0301)  BP: (!) 137/93 (12/30/22 1307)  SpO2: 97 % (12/30/22 1201)   Vital Signs (24h Range):  Temp:  [98.3 °F (36.8 °C)-99 °F (37.2 °C)] 98.3 °F (36.8 °C)  Pulse:  [] 66  Resp:  [18] 18  SpO2:  [91 %-98 %] 97 %  BP: (124-169)/() 137/93        There is no height or weight on file to calculate BMI.    Intake/Output Summary (Last 24 hours) at 12/30/2022 1315  Last data filed at 12/29/2022 1826  Gross per 24 hour   Intake --   Output 800 ml   Net -800 ml      Physical Exam  Eyes:      Pupils: Pupils are equal, round, and reactive to light.   Cardiovascular:      Rate and Rhythm: Normal rate.      Pulses: Normal pulses.      Heart sounds: No murmur heard.  Pulmonary:      Effort: Pulmonary effort is normal.      Breath sounds: Normal breath sounds.   Abdominal:      General: Abdomen is flat. Bowel sounds are normal. There is no distension.      Palpations: Abdomen is soft.      Tenderness: There is no guarding.   Musculoskeletal:          General: Normal range of motion.      Cervical back: Normal range of motion and neck supple.   Skin:     General: Skin is warm.   Neurological:      General: No focal deficit present.      Mental Status: He is alert.   Psychiatric:         Mood and Affect: Mood normal.         Behavior: Behavior normal.       Significant Labs: All pertinent labs within the past 24 hours have been reviewed.  Recent Lab Results       None            Significant Imaging: I have reviewed all pertinent imaging results/findings within the past 24 hours.

## 2022-12-30 NOTE — ASSESSMENT & PLAN NOTE
Continue remdesivir  Continue supportive therapy for COVID-19.  Not requiring high flow of supplemental oxygen

## 2022-12-30 NOTE — SUBJECTIVE & OBJECTIVE
No past medical history on file.    No past surgical history on file.    Review of patient's allergies indicates:  No Known Allergies    No current facility-administered medications on file prior to encounter.     No current outpatient medications on file prior to encounter.     Family History    None       Tobacco Use    Smoking status: Not on file    Smokeless tobacco: Not on file   Substance and Sexual Activity    Alcohol use: Not on file    Drug use: Not on file    Sexual activity: Not on file     Review of Systems   Constitutional: Negative.    HENT: Negative.     Eyes: Negative.    Respiratory:  Negative for cough, chest tightness and shortness of breath.    Cardiovascular:  Negative for chest pain and leg swelling.   Gastrointestinal:  Negative for abdominal pain, diarrhea, nausea and vomiting.   Endocrine: Negative.    Genitourinary: Negative.    Musculoskeletal: Negative.    Skin: Negative.    Allergic/Immunologic: Negative.    Neurological:  Positive for dizziness, weakness and light-headedness. Negative for headaches.   Hematological: Negative.    Psychiatric/Behavioral: Negative.     Objective:     Vital Signs (Most Recent):  Temp: 99 °F (37.2 °C) (12/29/22 0556)  Pulse: 102 (12/29/22 1656)  Resp: (!) 22 (12/29/22 1106)  BP: (!) 140/74 (12/29/22 1106)  SpO2: (!) 94 % (12/29/22 1656)   Vital Signs (24h Range):  Temp:  [99 °F (37.2 °C)] 99 °F (37.2 °C)  Pulse:  [] 102  Resp:  [13-30] 22  SpO2:  [93 %-98 %] 94 %  BP: (108-140)/(65-80) 140/74        There is no height or weight on file to calculate BMI.    Physical Exam  Eyes:      Pupils: Pupils are equal, round, and reactive to light.   Cardiovascular:      Rate and Rhythm: Normal rate.      Pulses: Normal pulses.      Heart sounds: No murmur heard.  Pulmonary:      Effort: Pulmonary effort is normal.      Breath sounds: Normal breath sounds.   Abdominal:      General: Abdomen is flat. Bowel sounds are normal. There is no distension.      Palpations:  Abdomen is soft.      Tenderness: There is no guarding.   Musculoskeletal:         General: Normal range of motion.      Cervical back: Normal range of motion and neck supple.   Skin:     General: Skin is warm.   Neurological:      General: No focal deficit present.      Mental Status: He is alert.      Motor: Weakness present.   Psychiatric:         Mood and Affect: Mood normal.         Behavior: Behavior normal.         CRANIAL NERVES     CN III, IV, VI   Pupils are equal, round, and reactive to light.     Significant Labs: All pertinent labs within the past 24 hours have been reviewed.  Recent Lab Results         12/29/22  0956   12/29/22  0620        Influenza A, Molecular   Not Detected       Influenza B, Molecular   Not Detected       Albumin/Globulin Ratio   1.4       Albumin   3.4       Alkaline Phosphatase   98       ALT   13       Appearance, UA Clear         AST   16       Bacteria, UA None Seen         BILIRUBIN TOTAL   1.0       Bilirubin, UA Negative         BNP   213.0       BUN   17.3       Calcium   8.7       Chloride   107       CO2   21       Color, UA Yellow         Creatinine   1.01       eGFR   >60       Globulin, Total   2.5       Glucose   143       Glucose, UA Negative         Gran # (ANC)   7.626       Hematocrit   40.7       Hemoglobin   13.5       Immature Grans (Abs)   0.03       Immature Granulocytes   0.4       Instr WBC   8.2       Ketones, UA Negative         Leukocytes, UA Negative         Lymph #   0.246       Lymph Man   3       Magnesium   1.90       MCH   31.7       MCHC   33.2       MCV   95.5       Mono #   0.328       Mono %   4       MPV   11.7       Neutrophils Relative   93       NITRITE UA Negative         nRBC   0.0       Occult Blood UA Negative         pH, UA 7.0         Platelet Estimate   Normal       Platelets   180       Potassium   3.7       PROTEIN TOTAL   5.9       Protein, UA Negative         RBC   4.26       RBC Morph   Normal       RBC, UA <5         RDW    12.5       SARS-CoV2 (COVID-19) Qualitative PCR   Detected       Sodium   137       Specific Gravity,UA 1.017         Squam Epithel, UA 7         Troponin I   0.032       Urobilinogen, UA 1.0         WBC, UA <5         WBC   8.2               Significant Imaging: I have reviewed all pertinent imaging results/findings within the past 24 hours.

## 2022-12-30 NOTE — PROGRESS NOTES
[facility] - 3rd [facility] Medicine  Progress Note    Patient Name: Wolfgang Duval  MRN: [id number]  Patient Class: IP- Inpatient   Admission Date: 12/29/2022  Length of Stay: 1 days  Attending Physician: Toan Ford II, MD  Primary Care Provider: Primary Doctor No        Subjective:     Principal Problem:COVID-19        HPI:  Wolfgang is a 89-year-old gentleman with a history hypertension hyperlipidemia and some dementia presenting today with some shortness of breath and generalized weakness.  He was on the toilet and wife had to help him up because he was so weak he could not support his own weight.  He was brought to the emergency room was found to have COVID however from a respiratory standpoint stable.  He is dealing with some agitation currently.  Does require restraints and also round of Haldol this morning.  He is being admitted for treatment of COVID-19      Overview/Hospital Course:  No notes on file    Interval History:  Patient was admitted with acute hypoxic respiratory failure secondary to COVID-19 also some generalized weakness and deconditioning.  Required restraints last night   Low more awake and alert today  Currently receiving remdesivir.    Vital signs are stable afebrile    Review of Systems   Constitutional: Negative.    HENT: Negative.     Eyes: Negative.    Respiratory:  Negative for cough, chest tightness and shortness of breath.    Cardiovascular:  Negative for chest pain and leg swelling.   Gastrointestinal:  Negative for abdominal pain, diarrhea, nausea and vomiting.   Endocrine: Negative.    Genitourinary: Negative.    Musculoskeletal: Negative.    Skin: Negative.    Allergic/Immunologic: Negative.    Neurological:  Positive for weakness. Negative for headaches.   Hematological: Negative.    Psychiatric/Behavioral: Negative.     Objective:     Vital Signs (Most Recent):  Temp: 98.3 °F (36.8 °C) (12/30/22 1307)  Pulse: 66 (12/30/22 1307)  Resp:  18 (12/30/22 0301)  BP: (!) 137/93 (12/30/22 1307)  SpO2: 97 % (12/30/22 1201)   Vital Signs (24h Range):  Temp:  [98.3 °F (36.8 °C)-99 °F (37.2 °C)] 98.3 °F (36.8 °C)  Pulse:  [] 66  Resp:  [18] 18  SpO2:  [91 %-98 %] 97 %  BP: (124-169)/() 137/93        There is no height or weight on file to calculate BMI.    Intake/Output Summary (Last 24 hours) at 12/30/2022 1315  Last data filed at 12/29/2022 1826  Gross per 24 hour   Intake --   Output 800 ml   Net -800 ml      Physical Exam  Eyes:      Pupils: Pupils are equal, round, and reactive to light.   Cardiovascular:      Rate and Rhythm: Normal rate.      Pulses: Normal pulses.      Heart sounds: No murmur heard.  Pulmonary:      Effort: Pulmonary effort is normal.      Breath sounds: Normal breath sounds.   Abdominal:      General: Abdomen is flat. Bowel sounds are normal. There is no distension.      Palpations: Abdomen is soft.      Tenderness: There is no guarding.   Musculoskeletal:         General: Normal range of motion.      Cervical back: Normal range of motion and neck supple.   Skin:     General: Skin is warm.   Neurological:      General: No focal deficit present.      Mental Status: He is alert.   Psychiatric:         Mood and Affect: Mood normal.         Behavior: Behavior normal.       Significant Labs: All pertinent labs within the past 24 hours have been reviewed.  Recent Lab Results       None            Significant Imaging: I have reviewed all pertinent imaging results/findings within the past 24 hours.      Assessment/Plan:      * COVID-19  Continue remdesivir  Continue supportive therapy for COVID-19.  Not requiring high flow of supplemental oxygen    Coronary artery disease  Stable continue current therapy      Essential (primary) hypertension  Currently stable continue current therapy      Alzheimer's disease with late onset  Does have some agitation status post Haldol  Will add IV Benadryl   Continue with current  therapy.        VTE Risk Mitigation (From admission, onward)         Ordered     IP VTE HIGH RISK PATIENT  Once         12/29/22 1016     Place sequential compression device  Until discontinued         12/29/22 1016                Discharge Planning   ELOISE:      Code Status: Full Code   Is the patient medically ready for discharge?:     Reason for patient still in hospital (select all that apply): Treatment                     HARLEEN TRIPATHI MD  Department of Hospital Medicine   [facility] - 3rd Floor Medical Telemetry

## 2022-12-30 NOTE — HPI
Wolfgang is a 89-year-old gentleman with a history hypertension hyperlipidemia and some dementia presenting today with some shortness of breath and generalized weakness.  He was on the toilet and wife had to help him up because he was so weak he could not support his own weight.  He was brought to the emergency room was found to have COVID however from a respiratory standpoint stable.  He is dealing with some agitation currently.  Does require restraints and also round of Haldol this morning.  He is being admitted for treatment of COVID-19

## 2022-12-30 NOTE — NURSING
Nurses Note -- 4 Eyes      12/30/2022   4:26 PM      Skin assessed during: Admit      [] No Pressure Injuries Present    []Prevention Measures Documented      [x] Yes- Altered Skin Integrity Present or Discovered   [x] LDA Added if Not in Epic (Describe Wound)   [x] New Altered Skin Integrity was Present on Admit and Documented in LDA   [x] Wound Image Taken    Wound Care Consulted? Yes    Attending Nurse:  Monica Aguair RN     Second RN/Staff Member:  Sal Contreras

## 2022-12-30 NOTE — H&P
[facility]  Emergency [facility] Medicine  History & Physical    Patient Name: Wolfgang Duval  MRN: [id number]  Patient Class: IP- Inpatient  Admission Date: 12/29/2022  Attending Physician: Taon Ford II, MD   Primary Care Provider: Primary Doctor No         Patient information was obtained from ER records.     Subjective:     Principal Problem:<principal problem not specified>    Chief Complaint:   Chief Complaint   Patient presents with    Weakness     Spouse diagnosed w/ covid 2 days ago, pt w/ productive cough and increased weakness this am        HPI: Wolfgang is a 89-year-old gentleman with a history hypertension hyperlipidemia and some dementia presenting today with some shortness of breath and generalized weakness.  He was on the toilet and wife had to help him up because he was so weak he could not support his own weight.  He was brought to the emergency room was found to have COVID however from a respiratory standpoint stable.  He is dealing with some agitation currently.  Does require restraints and also round of Haldol this morning.  He is being admitted for treatment of COVID-19      No past medical history on file.    No past surgical history on file.    Review of patient's allergies indicates:  No Known Allergies    No current facility-administered medications on file prior to encounter.     No current outpatient medications on file prior to encounter.     Family History    None       Tobacco Use    Smoking status: Not on file    Smokeless tobacco: Not on file   Substance and Sexual Activity    Alcohol use: Not on file    Drug use: Not on file    Sexual activity: Not on file     Review of Systems   Constitutional: Negative.    HENT: Negative.     Eyes: Negative.    Respiratory:  Negative for cough, chest tightness and shortness of breath.    Cardiovascular:  Negative for chest pain and leg swelling.   Gastrointestinal:  Negative for abdominal pain, diarrhea, nausea and  vomiting.   Endocrine: Negative.    Genitourinary: Negative.    Musculoskeletal: Negative.    Skin: Negative.    Allergic/Immunologic: Negative.    Neurological:  Positive for dizziness, weakness and light-headedness. Negative for headaches.   Hematological: Negative.    Psychiatric/Behavioral: Negative.     Objective:     Vital Signs (Most Recent):  Temp: 99 °F (37.2 °C) (12/29/22 0556)  Pulse: 102 (12/29/22 1656)  Resp: (!) 22 (12/29/22 1106)  BP: (!) 140/74 (12/29/22 1106)  SpO2: (!) 94 % (12/29/22 1656)   Vital Signs (24h Range):  Temp:  [99 °F (37.2 °C)] 99 °F (37.2 °C)  Pulse:  [] 102  Resp:  [13-30] 22  SpO2:  [93 %-98 %] 94 %  BP: (108-140)/(65-80) 140/74        There is no height or weight on file to calculate BMI.    Physical Exam  Eyes:      Pupils: Pupils are equal, round, and reactive to light.   Cardiovascular:      Rate and Rhythm: Normal rate.      Pulses: Normal pulses.      Heart sounds: No murmur heard.  Pulmonary:      Effort: Pulmonary effort is normal.      Breath sounds: Normal breath sounds.   Abdominal:      General: Abdomen is flat. Bowel sounds are normal. There is no distension.      Palpations: Abdomen is soft.      Tenderness: There is no guarding.   Musculoskeletal:         General: Normal range of motion.      Cervical back: Normal range of motion and neck supple.   Skin:     General: Skin is warm.   Neurological:      General: No focal deficit present.      Mental Status: He is alert.      Motor: Weakness present.   Psychiatric:         Mood and Affect: Mood normal.         Behavior: Behavior normal.         CRANIAL NERVES     CN III, IV, VI   Pupils are equal, round, and reactive to light.     Significant Labs: All pertinent labs within the past 24 hours have been reviewed.  Recent Lab Results         12/29/22  0956   12/29/22  0620        Influenza A, Molecular   Not Detected       Influenza B, Molecular   Not Detected       Albumin/Globulin Ratio   1.4       Albumin   3.4        Alkaline Phosphatase   98       ALT   13       Appearance, UA Clear         AST   16       Bacteria, UA None Seen         BILIRUBIN TOTAL   1.0       Bilirubin, UA Negative         BNP   213.0       BUN   17.3       Calcium   8.7       Chloride   107       CO2   21       Color, UA Yellow         Creatinine   1.01       eGFR   >60       Globulin, Total   2.5       Glucose   143       Glucose, UA Negative         Gran # (ANC)   7.626       Hematocrit   40.7       Hemoglobin   13.5       Immature Grans (Abs)   0.03       Immature Granulocytes   0.4       Instr WBC   8.2       Ketones, UA Negative         Leukocytes, UA Negative         Lymph #   0.246       Lymph Man   3       Magnesium   1.90       MCH   31.7       MCHC   33.2       MCV   95.5       Mono #   0.328       Mono %   4       MPV   11.7       Neutrophils Relative   93       NITRITE UA Negative         nRBC   0.0       Occult Blood UA Negative         pH, UA 7.0         Platelet Estimate   Normal       Platelets   180       Potassium   3.7       PROTEIN TOTAL   5.9       Protein, UA Negative         RBC   4.26       RBC Morph   Normal       RBC, UA <5         RDW   12.5       SARS-CoV2 (COVID-19) Qualitative PCR   Detected       Sodium   137       Specific Gravity,UA 1.017         Squam Epithel, UA 7         Troponin I   0.032       Urobilinogen, UA 1.0         WBC, UA <5         WBC   8.2               Significant Imaging: I have reviewed all pertinent imaging results/findings within the past 24 hours.    Assessment/Plan:     Coronary artery disease  Stable continue current therapy      Essential (primary) hypertension  Currently stable continue current therapy      Alzheimer's disease with late onset  Does have some agitation status post Haldol  Will add IV Benadryl   Continue with current therapy.      COVID-19  Started on remdesivir   Continue supportive therapy for COVID-19.      VTE Risk Mitigation (From admission, onward)         Ordered     IP VTE  HIGH RISK PATIENT  Once         12/29/22 1016     Place sequential compression device  Until discontinued         12/29/22 1016                   HARLEEN TRIPATHI MD  Department of Hospital Medicine   [facility] - Emergency Dept

## 2022-12-31 PROCEDURE — 63600175 PHARM REV CODE 636 W HCPCS: Mod: TB | Performed by: INTERNAL MEDICINE

## 2022-12-31 PROCEDURE — 25000003 PHARM REV CODE 250: Performed by: INTERNAL MEDICINE

## 2022-12-31 PROCEDURE — 99233 PR SUBSEQUENT HOSPITAL CARE,LEVL III: ICD-10-PCS | Mod: CR,,, | Performed by: STUDENT IN AN ORGANIZED HEALTH CARE EDUCATION/TRAINING PROGRAM

## 2022-12-31 PROCEDURE — 25000003 PHARM REV CODE 250: Performed by: STUDENT IN AN ORGANIZED HEALTH CARE EDUCATION/TRAINING PROGRAM

## 2022-12-31 PROCEDURE — 99233 SBSQ HOSP IP/OBS HIGH 50: CPT | Mod: CR,,, | Performed by: STUDENT IN AN ORGANIZED HEALTH CARE EDUCATION/TRAINING PROGRAM

## 2022-12-31 PROCEDURE — 21400001 HC TELEMETRY ROOM

## 2022-12-31 PROCEDURE — 27000207 HC ISOLATION

## 2022-12-31 RX ORDER — TAMSULOSIN HYDROCHLORIDE 0.4 MG/1
0.4 CAPSULE ORAL DAILY
Status: DISCONTINUED | OUTPATIENT
Start: 2023-01-01 | End: 2023-01-06 | Stop reason: HOSPADM

## 2022-12-31 RX ORDER — CARVEDILOL 3.12 MG/1
6.25 TABLET ORAL 2 TIMES DAILY
Status: DISCONTINUED | OUTPATIENT
Start: 2022-12-31 | End: 2023-01-06 | Stop reason: HOSPADM

## 2022-12-31 RX ORDER — LISINOPRIL 10 MG/1
10 TABLET ORAL DAILY
Status: DISCONTINUED | OUTPATIENT
Start: 2023-01-01 | End: 2023-01-06 | Stop reason: HOSPADM

## 2022-12-31 RX ORDER — CLOPIDOGREL BISULFATE 75 MG/1
75 TABLET ORAL DAILY
Status: DISCONTINUED | OUTPATIENT
Start: 2023-01-01 | End: 2023-01-06 | Stop reason: HOSPADM

## 2022-12-31 RX ORDER — NAPROXEN SODIUM 220 MG/1
81 TABLET, FILM COATED ORAL DAILY
Status: DISCONTINUED | OUTPATIENT
Start: 2023-01-01 | End: 2023-01-06 | Stop reason: HOSPADM

## 2022-12-31 RX ADMIN — CARVEDILOL 6.25 MG: 3.12 TABLET, FILM COATED ORAL at 08:12

## 2022-12-31 RX ADMIN — REMDESIVIR 100 MG: 100 INJECTION, POWDER, LYOPHILIZED, FOR SOLUTION INTRAVENOUS at 08:12

## 2022-12-31 NOTE — PLAN OF CARE
Problem: Adult Inpatient Plan of Care  Goal: Plan of Care Review  Outcome: Ongoing, Progressing  Goal: Patient-Specific Goal (Individualized)  Outcome: Ongoing, Progressing  Goal: Absence of Hospital-Acquired Illness or Injury  Outcome: Ongoing, Progressing  Goal: Optimal Comfort and Wellbeing  Outcome: Ongoing, Progressing  Goal: Readiness for Transition of Care  Outcome: Ongoing, Progressing     Problem: Fall Injury Risk  Goal: Absence of Fall and Fall-Related Injury  Outcome: Ongoing, Progressing     Problem: Skin Injury Risk Increased  Goal: Skin Health and Integrity  Outcome: Ongoing, Progressing     Problem: Impaired Wound Healing  Goal: Optimal Wound Healing  Outcome: Ongoing, Progressing     Problem: Infection  Goal: Absence of Infection Signs and Symptoms  Outcome: Ongoing, Progressing

## 2022-12-31 NOTE — SUBJECTIVE & OBJECTIVE
Interval History: Slightly more alert today, oriented x1. Afebrile, O2 sats stable on 2 L O2.     Review of Systems   Constitutional:  Negative for chills, diaphoresis and fever.   HENT:  Negative for ear pain and rhinorrhea.    Respiratory:  Negative for cough, chest tightness and shortness of breath.    Cardiovascular:  Negative for chest pain, palpitations and leg swelling.   Gastrointestinal:  Negative for abdominal pain, constipation, diarrhea, nausea and vomiting.   Genitourinary:  Negative for difficulty urinating, dysuria and flank pain.   Musculoskeletal:  Negative for back pain and joint swelling.   Neurological:  Negative for dizziness, weakness, light-headedness, numbness and headaches.   Psychiatric/Behavioral:  Negative for confusion.    Objective:     Vital Signs (Most Recent):  Temp: 97.9 °F (36.6 °C) (12/31/22 1200)  Pulse: 86 (12/31/22 1200)  Resp: 18 (12/31/22 1200)  BP: 122/82 (12/31/22 1200)  SpO2: 97 % (12/31/22 1200) Vital Signs (24h Range):  Temp:  [97.6 °F (36.4 °C)-98.6 °F (37 °C)] 97.9 °F (36.6 °C)  Pulse:  [62-89] 86  Resp:  [16-18] 18  SpO2:  [93 %-98 %] 97 %  BP: (122-162)/(82-93) 122/82        There is no height or weight on file to calculate BMI.    Intake/Output Summary (Last 24 hours) at 12/31/2022 1443  Last data filed at 12/31/2022 1338  Gross per 24 hour   Intake 340 ml   Output 500 ml   Net -160 ml      Physical Exam  Constitutional:       Appearance: Normal appearance.   HENT:      Head: Normocephalic and atraumatic.   Cardiovascular:      Rate and Rhythm: Normal rate and regular rhythm.      Pulses: Normal pulses.   Pulmonary:      Effort: Pulmonary effort is normal.      Breath sounds: Normal breath sounds.   Abdominal:      General: Abdomen is flat. Bowel sounds are normal. There is no distension.      Palpations: Abdomen is soft.      Tenderness: There is no abdominal tenderness.   Musculoskeletal:         General: No tenderness. Normal range of motion.      Right lower  leg: No edema.      Left lower leg: No edema.   Lymphadenopathy:      Cervical: No cervical adenopathy.   Neurological:      General: No focal deficit present.      Mental Status: He is alert.       Significant Labs: All pertinent labs within the past 24 hours have been reviewed.    Significant Imaging: I have reviewed all pertinent imaging results/findings within the past 24 hours.

## 2022-12-31 NOTE — PROGRESS NOTES
[facility] - 3rd [facility] Medicine  Progress Note    Patient Name: Wolfgang Duval  MRN: [id number]  Patient Class: IP- Inpatient   Admission Date: 12/29/2022  Length of Stay: 2 days  Attending Physician: Toan Ford II, MD  Primary Care Provider: Primary Doctor No        Subjective:     Principal Problem:COVID-19        HPI:  Wolfgang is a 89-year-old gentleman with a history hypertension hyperlipidemia and some dementia presenting today with some shortness of breath and generalized weakness.  He was on the toilet and wife had to help him up because he was so weak he could not support his own weight.  He was brought to the emergency room was found to have COVID however from a respiratory standpoint stable.  He is dealing with some agitation currently.  Does require restraints and also round of Haldol this morning.  He is being admitted for treatment of COVID-19      Overview/Hospital Course:  No notes on file    Interval History: Slightly more alert today, oriented x1. Afebrile, O2 sats stable on 2 L O2.     Review of Systems   Constitutional:  Negative for chills, diaphoresis and fever.   HENT:  Negative for ear pain and rhinorrhea.    Respiratory:  Negative for cough, chest tightness and shortness of breath.    Cardiovascular:  Negative for chest pain, palpitations and leg swelling.   Gastrointestinal:  Negative for abdominal pain, constipation, diarrhea, nausea and vomiting.   Genitourinary:  Negative for difficulty urinating, dysuria and flank pain.   Musculoskeletal:  Negative for back pain and joint swelling.   Neurological:  Negative for dizziness, weakness, light-headedness, numbness and headaches.   Psychiatric/Behavioral:  Negative for confusion.    Objective:     Vital Signs (Most Recent):  Temp: 97.9 °F (36.6 °C) (12/31/22 1200)  Pulse: 86 (12/31/22 1200)  Resp: 18 (12/31/22 1200)  BP: 122/82 (12/31/22 1200)  SpO2: 97 % (12/31/22 1200) Vital Signs (24h  Range):  Temp:  [97.6 °F (36.4 °C)-98.6 °F (37 °C)] 97.9 °F (36.6 °C)  Pulse:  [62-89] 86  Resp:  [16-18] 18  SpO2:  [93 %-98 %] 97 %  BP: (122-162)/(82-93) 122/82        There is no height or weight on file to calculate BMI.    Intake/Output Summary (Last 24 hours) at 12/31/2022 1443  Last data filed at 12/31/2022 1338  Gross per 24 hour   Intake 340 ml   Output 500 ml   Net -160 ml      Physical Exam  Constitutional:       Appearance: Normal appearance.   HENT:      Head: Normocephalic and atraumatic.   Cardiovascular:      Rate and Rhythm: Normal rate and regular rhythm.      Pulses: Normal pulses.   Pulmonary:      Effort: Pulmonary effort is normal.      Breath sounds: Normal breath sounds.   Abdominal:      General: Abdomen is flat. Bowel sounds are normal. There is no distension.      Palpations: Abdomen is soft.      Tenderness: There is no abdominal tenderness.   Musculoskeletal:         General: No tenderness. Normal range of motion.      Right lower leg: No edema.      Left lower leg: No edema.   Lymphadenopathy:      Cervical: No cervical adenopathy.   Neurological:      General: No focal deficit present.      Mental Status: He is alert.       Significant Labs: All pertinent labs within the past 24 hours have been reviewed.    Significant Imaging: I have reviewed all pertinent imaging results/findings within the past 24 hours.      Assessment/Plan:      * COVID-19  Continue remdesivir  Continue supportive therapy for COVID-19.  On 2 L O2, wean as tolerated     Coronary artery disease  Stable continue current therapy      Essential (primary) hypertension  Currently stable continue current therapy      Alzheimer's disease with late onset  Does have some agitation status post Haldol  Continue IV Benadryl   Continue with current therapy.        VTE Risk Mitigation (From admission, onward)         Ordered     IP VTE HIGH RISK PATIENT  Once         12/29/22 1016     Place sequential compression device  Until  discontinued         12/29/22 1016                Discharge Planning   ELOISE:      Code Status: Full Code   Is the patient medically ready for discharge?:     Reason for patient still in hospital (select all that apply): Treatment                     Aisha Martell MD  Department of Hospital Medicine   [facility] - 3rd Floor Medical Telemetry

## 2022-12-31 NOTE — ASSESSMENT & PLAN NOTE
Does have some agitation status post Haldol  Continue IV Benadryl   Continue with current therapy.

## 2023-01-01 PROCEDURE — 21400001 HC TELEMETRY ROOM

## 2023-01-01 PROCEDURE — 25000003 PHARM REV CODE 250: Performed by: STUDENT IN AN ORGANIZED HEALTH CARE EDUCATION/TRAINING PROGRAM

## 2023-01-01 PROCEDURE — 27000221 HC OXYGEN, UP TO 24 HOURS

## 2023-01-01 PROCEDURE — 25000003 PHARM REV CODE 250: Performed by: INTERNAL MEDICINE

## 2023-01-01 PROCEDURE — 99233 SBSQ HOSP IP/OBS HIGH 50: CPT | Mod: CR,,, | Performed by: STUDENT IN AN ORGANIZED HEALTH CARE EDUCATION/TRAINING PROGRAM

## 2023-01-01 PROCEDURE — 27000207 HC ISOLATION

## 2023-01-01 PROCEDURE — 94761 N-INVAS EAR/PLS OXIMETRY MLT: CPT

## 2023-01-01 PROCEDURE — 99233 PR SUBSEQUENT HOSPITAL CARE,LEVL III: ICD-10-PCS | Mod: CR,,, | Performed by: STUDENT IN AN ORGANIZED HEALTH CARE EDUCATION/TRAINING PROGRAM

## 2023-01-01 RX ADMIN — CLOPIDOGREL BISULFATE 75 MG: 75 TABLET ORAL at 09:01

## 2023-01-01 RX ADMIN — Medication 81 MG: at 09:01

## 2023-01-01 RX ADMIN — Medication 6 MG: at 10:01

## 2023-01-01 RX ADMIN — CARVEDILOL 6.25 MG: 3.12 TABLET, FILM COATED ORAL at 08:01

## 2023-01-01 RX ADMIN — LISINOPRIL 10 MG: 10 TABLET ORAL at 09:01

## 2023-01-01 RX ADMIN — THERA TABS 1 TABLET: TAB at 09:01

## 2023-01-01 RX ADMIN — TAMSULOSIN HYDROCHLORIDE 0.4 MG: 0.4 CAPSULE ORAL at 09:01

## 2023-01-01 RX ADMIN — CARVEDILOL 6.25 MG: 3.12 TABLET, FILM COATED ORAL at 09:01

## 2023-01-01 NOTE — SUBJECTIVE & OBJECTIVE
Interval History: Stable, afebrile, O2 sats stable on nasal cannula     Review of Systems   Constitutional:  Negative for chills, diaphoresis and fever.   HENT:  Negative for ear pain and rhinorrhea.    Respiratory:  Negative for cough, chest tightness and shortness of breath.    Cardiovascular:  Negative for chest pain, palpitations and leg swelling.   Gastrointestinal:  Negative for abdominal pain, constipation, diarrhea, nausea and vomiting.   Genitourinary:  Negative for difficulty urinating, dysuria and flank pain.   Musculoskeletal:  Negative for back pain and joint swelling.   Neurological:  Negative for dizziness, weakness, light-headedness, numbness and headaches.   Psychiatric/Behavioral:  Negative for confusion.    Objective:     Vital Signs (Most Recent):  Temp: 96.4 °F (35.8 °C) (01/01/23 0811)  Pulse: 63 (01/01/23 0811)  Resp: 16 (01/01/23 0811)  BP: 129/77 (01/01/23 0811)  SpO2: 96 % (01/01/23 0811) Vital Signs (24h Range):  Temp:  [96.4 °F (35.8 °C)-98.9 °F (37.2 °C)] 96.4 °F (35.8 °C)  Pulse:  [63-77] 63  Resp:  [16-18] 16  SpO2:  [96 %-99 %] 96 %  BP: (113-129)/(71-81) 129/77        There is no height or weight on file to calculate BMI.    Intake/Output Summary (Last 24 hours) at 1/1/2023 1314  Last data filed at 1/1/2023 0402  Gross per 24 hour   Intake 260 ml   Output 1300 ml   Net -1040 ml      Physical Exam  Constitutional:       Appearance: Normal appearance.   HENT:      Head: Normocephalic and atraumatic.   Cardiovascular:      Rate and Rhythm: Normal rate and regular rhythm.      Pulses: Normal pulses.   Pulmonary:      Effort: Pulmonary effort is normal.      Breath sounds: Normal breath sounds.   Abdominal:      General: Abdomen is flat. Bowel sounds are normal. There is no distension.      Palpations: Abdomen is soft.      Tenderness: There is no abdominal tenderness.   Musculoskeletal:         General: No tenderness. Normal range of motion.      Right lower leg: No edema.      Left  lower leg: No edema.   Lymphadenopathy:      Cervical: No cervical adenopathy.   Neurological:      General: No focal deficit present.      Mental Status: He is alert and oriented to person, place, and time.       Significant Labs: All pertinent labs within the past 24 hours have been reviewed.    Significant Imaging: I have reviewed all pertinent imaging results/findings within the past 24 hours.

## 2023-01-01 NOTE — PROGRESS NOTES
[facility] - 3rd [facility] Medicine  Progress Note    Patient Name: Wolfgang Duval  MRN: [id number]  Patient Class: IP- Inpatient   Admission Date: 12/29/2022  Length of Stay: 3 days  Attending Physician: Toan Ford II, MD  Primary Care Provider: Primary Doctor No        Subjective:     Principal Problem:COVID-19        HPI:  Wolfgang is a 89-year-old gentleman with a history hypertension hyperlipidemia and some dementia presenting today with some shortness of breath and generalized weakness.  He was on the toilet and wife had to help him up because he was so weak he could not support his own weight.  He was brought to the emergency room was found to have COVID however from a respiratory standpoint stable.  He is dealing with some agitation currently.  Does require restraints and also round of Haldol this morning.  He is being admitted for treatment of COVID-19      Overview/Hospital Course:  No notes on file    Interval History: Stable, afebrile, O2 sats stable on nasal cannula     Review of Systems   Constitutional:  Negative for chills, diaphoresis and fever.   HENT:  Negative for ear pain and rhinorrhea.    Respiratory:  Negative for cough, chest tightness and shortness of breath.    Cardiovascular:  Negative for chest pain, palpitations and leg swelling.   Gastrointestinal:  Negative for abdominal pain, constipation, diarrhea, nausea and vomiting.   Genitourinary:  Negative for difficulty urinating, dysuria and flank pain.   Musculoskeletal:  Negative for back pain and joint swelling.   Neurological:  Negative for dizziness, weakness, light-headedness, numbness and headaches.   Psychiatric/Behavioral:  Negative for confusion.    Objective:     Vital Signs (Most Recent):  Temp: 96.4 °F (35.8 °C) (01/01/23 0811)  Pulse: 63 (01/01/23 0811)  Resp: 16 (01/01/23 0811)  BP: 129/77 (01/01/23 0811)  SpO2: 96 % (01/01/23 0811) Vital Signs (24h Range):  Temp:  [96.4 °F (35.8  °C)-98.9 °F (37.2 °C)] 96.4 °F (35.8 °C)  Pulse:  [63-77] 63  Resp:  [16-18] 16  SpO2:  [96 %-99 %] 96 %  BP: (113-129)/(71-81) 129/77        There is no height or weight on file to calculate BMI.    Intake/Output Summary (Last 24 hours) at 1/1/2023 1314  Last data filed at 1/1/2023 0402  Gross per 24 hour   Intake 260 ml   Output 1300 ml   Net -1040 ml      Physical Exam  Constitutional:       Appearance: Normal appearance.   HENT:      Head: Normocephalic and atraumatic.   Cardiovascular:      Rate and Rhythm: Normal rate and regular rhythm.      Pulses: Normal pulses.   Pulmonary:      Effort: Pulmonary effort is normal.      Breath sounds: Normal breath sounds.   Abdominal:      General: Abdomen is flat. Bowel sounds are normal. There is no distension.      Palpations: Abdomen is soft.      Tenderness: There is no abdominal tenderness.   Musculoskeletal:         General: No tenderness. Normal range of motion.      Right lower leg: No edema.      Left lower leg: No edema.   Lymphadenopathy:      Cervical: No cervical adenopathy.   Neurological:      General: No focal deficit present.      Mental Status: He is alert and oriented to person, place, and time.       Significant Labs: All pertinent labs within the past 24 hours have been reviewed.    Significant Imaging: I have reviewed all pertinent imaging results/findings within the past 24 hours.      Assessment/Plan:      * COVID-19  Continue remdesivir  Continue supportive therapy for COVID-19.  On 2 L O2, wean as tolerated     Coronary artery disease  Stable continue current therapy      Essential (primary) hypertension  Currently stable continue current therapy      Alzheimer's disease with late onset  Does have some agitation status post Haldol  Continue IV Benadryl   Continue with current therapy.        VTE Risk Mitigation (From admission, onward)         Ordered     IP VTE HIGH RISK PATIENT  Once         12/29/22 1016     Place sequential compression device   Until discontinued         12/29/22 1016                Discharge Planning   ELOISE:      Code Status: Full Code   Is the patient medically ready for discharge?:     Reason for patient still in hospital (select all that apply): Imaging                     Aisha Martell MD  Department of Hospital Medicine   [facility] - 3rd Floor Medical Telemetry

## 2023-01-02 PROCEDURE — 25000003 PHARM REV CODE 250: Performed by: INTERNAL MEDICINE

## 2023-01-02 PROCEDURE — 99233 PR SUBSEQUENT HOSPITAL CARE,LEVL III: ICD-10-PCS | Mod: CR,,, | Performed by: STUDENT IN AN ORGANIZED HEALTH CARE EDUCATION/TRAINING PROGRAM

## 2023-01-02 PROCEDURE — 21400001 HC TELEMETRY ROOM

## 2023-01-02 PROCEDURE — 99233 SBSQ HOSP IP/OBS HIGH 50: CPT | Mod: CR,,, | Performed by: STUDENT IN AN ORGANIZED HEALTH CARE EDUCATION/TRAINING PROGRAM

## 2023-01-02 PROCEDURE — 25000003 PHARM REV CODE 250: Performed by: STUDENT IN AN ORGANIZED HEALTH CARE EDUCATION/TRAINING PROGRAM

## 2023-01-02 PROCEDURE — 27000221 HC OXYGEN, UP TO 24 HOURS

## 2023-01-02 PROCEDURE — 27000207 HC ISOLATION

## 2023-01-02 RX ADMIN — CLOPIDOGREL BISULFATE 75 MG: 75 TABLET ORAL at 08:01

## 2023-01-02 RX ADMIN — Medication 81 MG: at 08:01

## 2023-01-02 RX ADMIN — TAMSULOSIN HYDROCHLORIDE 0.4 MG: 0.4 CAPSULE ORAL at 08:01

## 2023-01-02 RX ADMIN — LISINOPRIL 10 MG: 10 TABLET ORAL at 08:01

## 2023-01-02 RX ADMIN — CARVEDILOL 6.25 MG: 3.12 TABLET, FILM COATED ORAL at 08:01

## 2023-01-02 RX ADMIN — THERA TABS 1 TABLET: TAB at 08:01

## 2023-01-02 RX ADMIN — Medication 6 MG: at 08:01

## 2023-01-02 NOTE — PROGRESS NOTES
[facility] - 3rd [facility] Medicine  Progress Note    Patient Name: Wolfgang Duval  MRN: [id number]  Patient Class: IP- Inpatient   Admission Date: 12/29/2022  Length of Stay: 4 days  Attending Physician: Toan Ford II, MD  Primary Care Provider: Primary Doctor No        Subjective:     Principal Problem:COVID-19        HPI:  Wolfgang is a 89-year-old gentleman with a history hypertension hyperlipidemia and some dementia presenting today with some shortness of breath and generalized weakness.  He was on the toilet and wife had to help him up because he was so weak he could not support his own weight.  He was brought to the emergency room was found to have COVID however from a respiratory standpoint stable.  He is dealing with some agitation currently.  Does require restraints and also round of Haldol this morning.  He is being admitted for treatment of COVID-19      Overview/Hospital Course:  No notes on file    Interval History: Stable on room air. Episode of bradycardia likely secondary to remdesivir, asymptomatic     Review of Systems   Constitutional:  Negative for chills, diaphoresis and fever.   HENT:  Negative for ear pain and rhinorrhea.    Respiratory:  Negative for cough, chest tightness and shortness of breath.    Cardiovascular:  Negative for chest pain, palpitations and leg swelling.   Gastrointestinal:  Negative for abdominal pain, constipation, diarrhea, nausea and vomiting.   Genitourinary:  Negative for difficulty urinating, dysuria and flank pain.   Musculoskeletal:  Negative for back pain and joint swelling.   Neurological:  Negative for dizziness, weakness, light-headedness, numbness and headaches.   Psychiatric/Behavioral:  Negative for confusion.    Objective:     Vital Signs (Most Recent):  Temp: 97.9 °F (36.6 °C) (01/02/23 1100)  Pulse: 74 (01/02/23 1100)  Resp: 18 (01/02/23 1100)  BP: 107/78 (01/02/23 1100)  SpO2: 97 % (01/02/23 1100) Vital Signs  (24h Range):  Temp:  [97.3 °F (36.3 °C)-97.9 °F (36.6 °C)] 97.9 °F (36.6 °C)  Pulse:  [53-74] 74  Resp:  [18] 18  SpO2:  [94 %-100 %] 97 %  BP: (107-137)/(63-83) 107/78        There is no height or weight on file to calculate BMI.    Intake/Output Summary (Last 24 hours) at 1/2/2023 1438  Last data filed at 1/2/2023 1436  Gross per 24 hour   Intake 480 ml   Output 450 ml   Net 30 ml      Physical Exam  Constitutional:       Appearance: Normal appearance.   HENT:      Head: Normocephalic and atraumatic.   Cardiovascular:      Rate and Rhythm: Normal rate and regular rhythm.      Pulses: Normal pulses.   Pulmonary:      Effort: Pulmonary effort is normal.      Breath sounds: Normal breath sounds.   Abdominal:      General: Abdomen is flat. Bowel sounds are normal. There is no distension.      Palpations: Abdomen is soft.      Tenderness: There is no abdominal tenderness.   Musculoskeletal:         General: No tenderness. Normal range of motion.      Right lower leg: No edema.      Left lower leg: No edema.   Lymphadenopathy:      Cervical: No cervical adenopathy.   Neurological:      General: No focal deficit present.      Mental Status: He is alert.       Significant Labs: All pertinent labs within the past 24 hours have been reviewed.    Significant Imaging: I have reviewed all pertinent imaging results/findings within the past 24 hours.      Assessment/Plan:      * COVID-19  Continue remdesivir, day 4. Discontinue after last dose tomorrow   Continue supportive therapy for COVID-19.  On room air     Coronary artery disease  Stable continue current therapy      Essential (primary) hypertension  Currently stable continue current therapy      Alzheimer's disease with late onset  Does have some agitation status post Haldol  Continue IV Benadryl   Continue with current therapy.        VTE Risk Mitigation (From admission, onward)         Ordered     IP VTE HIGH RISK PATIENT  Once         12/29/22 1016     Place sequential  compression device  Until discontinued         12/29/22 1016                Discharge Planning   ELOISE:      Code Status: Full Code   Is the patient medically ready for discharge?:     Reason for patient still in hospital (select all that apply): Treatment                     Aisha Martell MD  Department of Hospital Medicine   [facility] - 3rd Floor Medical Telemetry

## 2023-01-02 NOTE — SUBJECTIVE & OBJECTIVE
Interval History: Stable on room air. Episode of bradycardia likely secondary to remdesivir, asymptomatic     Review of Systems   Constitutional:  Negative for chills, diaphoresis and fever.   HENT:  Negative for ear pain and rhinorrhea.    Respiratory:  Negative for cough, chest tightness and shortness of breath.    Cardiovascular:  Negative for chest pain, palpitations and leg swelling.   Gastrointestinal:  Negative for abdominal pain, constipation, diarrhea, nausea and vomiting.   Genitourinary:  Negative for difficulty urinating, dysuria and flank pain.   Musculoskeletal:  Negative for back pain and joint swelling.   Neurological:  Negative for dizziness, weakness, light-headedness, numbness and headaches.   Psychiatric/Behavioral:  Negative for confusion.    Objective:     Vital Signs (Most Recent):  Temp: 97.9 °F (36.6 °C) (01/02/23 1100)  Pulse: 74 (01/02/23 1100)  Resp: 18 (01/02/23 1100)  BP: 107/78 (01/02/23 1100)  SpO2: 97 % (01/02/23 1100) Vital Signs (24h Range):  Temp:  [97.3 °F (36.3 °C)-97.9 °F (36.6 °C)] 97.9 °F (36.6 °C)  Pulse:  [53-74] 74  Resp:  [18] 18  SpO2:  [94 %-100 %] 97 %  BP: (107-137)/(63-83) 107/78        There is no height or weight on file to calculate BMI.    Intake/Output Summary (Last 24 hours) at 1/2/2023 1438  Last data filed at 1/2/2023 1436  Gross per 24 hour   Intake 480 ml   Output 450 ml   Net 30 ml      Physical Exam  Constitutional:       Appearance: Normal appearance.   HENT:      Head: Normocephalic and atraumatic.   Cardiovascular:      Rate and Rhythm: Normal rate and regular rhythm.      Pulses: Normal pulses.   Pulmonary:      Effort: Pulmonary effort is normal.      Breath sounds: Normal breath sounds.   Abdominal:      General: Abdomen is flat. Bowel sounds are normal. There is no distension.      Palpations: Abdomen is soft.      Tenderness: There is no abdominal tenderness.   Musculoskeletal:         General: No tenderness. Normal range of motion.      Right  lower leg: No edema.      Left lower leg: No edema.   Lymphadenopathy:      Cervical: No cervical adenopathy.   Neurological:      General: No focal deficit present.      Mental Status: He is alert.       Significant Labs: All pertinent labs within the past 24 hours have been reviewed.    Significant Imaging: I have reviewed all pertinent imaging results/findings within the past 24 hours.

## 2023-01-02 NOTE — ASSESSMENT & PLAN NOTE
Continue remdesivir, day 4. Discontinue after last dose tomorrow   Continue supportive therapy for COVID-19.  On room air

## 2023-01-03 LAB
BASOPHILS # BLD AUTO: 0.02 X10(3)/MCL (ref 0–0.2)
BASOPHILS NFR BLD AUTO: 0.1 %
EOSINOPHIL # BLD AUTO: 0 X10(3)/MCL (ref 0–0.9)
EOSINOPHIL NFR BLD AUTO: 0 %
ERYTHROCYTE [DISTWIDTH] IN BLOOD BY AUTOMATED COUNT: 12.1 % (ref 11.6–14.4)
HCT VFR BLD AUTO: 41.5 % (ref 42–52)
HGB BLD-MCNC: 14.2 GM/DL (ref 14–18)
IMM GRANULOCYTES # BLD AUTO: 0.05 X10(3)/MCL (ref 0–0.04)
IMM GRANULOCYTES NFR BLD AUTO: 0.3 %
LYMPHOCYTES # BLD AUTO: 0.5 X10(3)/MCL (ref 0.6–4.6)
LYMPHOCYTES NFR BLD AUTO: 3.2 %
MCH RBC QN AUTO: 31.4 PG
MCHC RBC AUTO-ENTMCNC: 34.2 MG/DL (ref 33–36)
MCV RBC AUTO: 91.8 FL (ref 80–94)
MONOCYTES # BLD AUTO: 1.41 X10(3)/MCL (ref 0.1–1.3)
MONOCYTES NFR BLD AUTO: 8.9 %
NEUTROPHILS # BLD AUTO: 13.81 X10(3)/MCL (ref 2.1–9.2)
NEUTROPHILS NFR BLD AUTO: 87.5 %
NRBC BLD AUTO-RTO: 0 % (ref 0–1)
PLATELET # BLD AUTO: 203 X10(3)/MCL (ref 140–371)
PMV BLD AUTO: 11.4 FL (ref 9.4–12.4)
RBC # BLD AUTO: 4.52 X10(6)/MCL (ref 4.7–6.1)
WBC # SPEC AUTO: 15.8 X10(3)/MCL (ref 4.5–11.5)

## 2023-01-03 PROCEDURE — 36415 COLL VENOUS BLD VENIPUNCTURE: CPT | Performed by: INTERNAL MEDICINE

## 2023-01-03 PROCEDURE — 21400001 HC TELEMETRY ROOM

## 2023-01-03 PROCEDURE — 27000207 HC ISOLATION

## 2023-01-03 PROCEDURE — 25000003 PHARM REV CODE 250: Performed by: INTERNAL MEDICINE

## 2023-01-03 PROCEDURE — 99232 PR SUBSEQUENT HOSPITAL CARE,LEVL II: ICD-10-PCS | Mod: CR,,, | Performed by: INTERNAL MEDICINE

## 2023-01-03 PROCEDURE — 85025 COMPLETE CBC W/AUTO DIFF WBC: CPT | Performed by: INTERNAL MEDICINE

## 2023-01-03 PROCEDURE — 97163 PT EVAL HIGH COMPLEX 45 MIN: CPT

## 2023-01-03 PROCEDURE — 51702 INSERT TEMP BLADDER CATH: CPT

## 2023-01-03 PROCEDURE — 99232 SBSQ HOSP IP/OBS MODERATE 35: CPT | Mod: CR,,, | Performed by: INTERNAL MEDICINE

## 2023-01-03 PROCEDURE — 25000003 PHARM REV CODE 250: Performed by: STUDENT IN AN ORGANIZED HEALTH CARE EDUCATION/TRAINING PROGRAM

## 2023-01-03 PROCEDURE — 51798 US URINE CAPACITY MEASURE: CPT

## 2023-01-03 PROCEDURE — 97166 OT EVAL MOD COMPLEX 45 MIN: CPT

## 2023-01-03 RX ORDER — MUPIROCIN 20 MG/G
OINTMENT TOPICAL 2 TIMES DAILY
Status: DISCONTINUED | OUTPATIENT
Start: 2023-01-03 | End: 2023-01-06 | Stop reason: HOSPADM

## 2023-01-03 RX ADMIN — CLOPIDOGREL BISULFATE 75 MG: 75 TABLET ORAL at 08:01

## 2023-01-03 RX ADMIN — Medication 81 MG: at 08:01

## 2023-01-03 RX ADMIN — TAMSULOSIN HYDROCHLORIDE 0.4 MG: 0.4 CAPSULE ORAL at 08:01

## 2023-01-03 RX ADMIN — CARVEDILOL 6.25 MG: 3.12 TABLET, FILM COATED ORAL at 09:01

## 2023-01-03 RX ADMIN — CARVEDILOL 6.25 MG: 3.12 TABLET, FILM COATED ORAL at 08:01

## 2023-01-03 RX ADMIN — MUPIROCIN: 20 OINTMENT TOPICAL at 09:01

## 2023-01-03 RX ADMIN — THERA TABS 1 TABLET: TAB at 08:01

## 2023-01-03 RX ADMIN — LISINOPRIL 10 MG: 10 TABLET ORAL at 08:01

## 2023-01-03 NOTE — SUBJECTIVE & OBJECTIVE
Interval History:  finished remdesivir   Required restraints last night  Pt awake and oriented      Review of Systems   Constitutional: Negative.    HENT: Negative.     Eyes: Negative.    Respiratory:  Negative for cough, chest tightness and shortness of breath.    Cardiovascular:  Negative for chest pain and leg swelling.   Gastrointestinal:  Negative for abdominal pain, diarrhea, nausea and vomiting.   Endocrine: Negative.    Genitourinary: Negative.    Musculoskeletal: Negative.    Skin: Negative.    Allergic/Immunologic: Negative.    Neurological:  Positive for weakness. Negative for headaches.   Hematological: Negative.    Psychiatric/Behavioral: Negative.     Objective:     Vital Signs (Most Recent):  Temp: 98.2 °F (36.8 °C) (01/03/23 1602)  Pulse: 76 (01/03/23 1602)  Resp: 20 (01/03/23 1602)  BP: 121/77 (01/03/23 1602)  SpO2: 96 % (01/03/23 0735)   Vital Signs (24h Range):  Temp:  [97.7 °F (36.5 °C)-98.3 °F (36.8 °C)] 98.2 °F (36.8 °C)  Pulse:  [67-85] 76  Resp:  [18-20] 20  SpO2:  [95 %-98 %] 96 %  BP: (121-137)/(63-79) 121/77     Weight: 96.6 kg (212 lb 15.4 oz) (EMR wt hx)  Body mass index is 27.62 kg/m².    Intake/Output Summary (Last 24 hours) at 1/3/2023 1615  Last data filed at 1/3/2023 1409  Gross per 24 hour   Intake 1200 ml   Output 100 ml   Net 1100 ml        Physical Exam  Eyes:      Pupils: Pupils are equal, round, and reactive to light.   Cardiovascular:      Rate and Rhythm: Normal rate.      Pulses: Normal pulses.      Heart sounds: No murmur heard.  Pulmonary:      Effort: Pulmonary effort is normal.      Breath sounds: Normal breath sounds.   Abdominal:      General: Abdomen is flat. Bowel sounds are normal. There is no distension.      Palpations: Abdomen is soft.      Tenderness: There is no guarding.   Musculoskeletal:         General: Normal range of motion.      Cervical back: Normal range of motion and neck supple.   Skin:     General: Skin is warm.   Neurological:      General: No  focal deficit present.      Mental Status: He is alert.   Psychiatric:         Mood and Affect: Mood normal.         Behavior: Behavior normal.       Significant Labs: All pertinent labs within the past 24 hours have been reviewed.  Recent Lab Results       None            Significant Imaging: I have reviewed all pertinent imaging results/findings within the past 24 hours.

## 2023-01-03 NOTE — ASSESSMENT & PLAN NOTE
Finished antivirals  On RA, SpO2 98%  Asymptomatic   Was admitted with weakness, this was coincidental finding  Cont PT will need placement

## 2023-01-03 NOTE — PROGRESS NOTES
[facility] - 3rd [facility] Medicine  Progress Note    Patient Name: Wolfgang Duval  MRN: [id number]  Patient Class: IP- Inpatient   Admission Date: 12/29/2022  Length of Stay: 5 days  Attending Physician: Toan Ford II, MD  Primary Care Provider: Primary Doctor No        Subjective:     Principal Problem:COVID-19        HPI:  Wolfgang is a 89-year-old gentleman with a history hypertension hyperlipidemia and some dementia presenting today with some shortness of breath and generalized weakness.  He was on the toilet and wife had to help him up because he was so weak he could not support his own weight.  He was brought to the emergency room was found to have COVID however from a respiratory standpoint stable.  He is dealing with some agitation currently.  Does require restraints and also round of Haldol this morning.  He is being admitted for treatment of COVID-19      Overview/Hospital Course:  No notes on file    Interval History:  finished remdesivir   Required restraints last night  Pt awake and oriented      Review of Systems   Constitutional: Negative.    HENT: Negative.     Eyes: Negative.    Respiratory:  Negative for cough, chest tightness and shortness of breath.    Cardiovascular:  Negative for chest pain and leg swelling.   Gastrointestinal:  Negative for abdominal pain, diarrhea, nausea and vomiting.   Endocrine: Negative.    Genitourinary: Negative.    Musculoskeletal: Negative.    Skin: Negative.    Allergic/Immunologic: Negative.    Neurological:  Positive for weakness. Negative for headaches.   Hematological: Negative.    Psychiatric/Behavioral: Negative.     Objective:     Vital Signs (Most Recent):  Temp: 98.2 °F (36.8 °C) (01/03/23 1602)  Pulse: 76 (01/03/23 1602)  Resp: 20 (01/03/23 1602)  BP: 121/77 (01/03/23 1602)  SpO2: 96 % (01/03/23 0735)   Vital Signs (24h Range):  Temp:  [97.7 °F (36.5 °C)-98.3 °F (36.8 °C)] 98.2 °F (36.8 °C)  Pulse:  [67-85]  76  Resp:  [18-20] 20  SpO2:  [95 %-98 %] 96 %  BP: (121-137)/(63-79) 121/77     Weight: 96.6 kg (212 lb 15.4 oz) (EMR wt hx)  Body mass index is 27.62 kg/m².    Intake/Output Summary (Last 24 hours) at 1/3/2023 1615  Last data filed at 1/3/2023 1409  Gross per 24 hour   Intake 1200 ml   Output 100 ml   Net 1100 ml        Physical Exam  Eyes:      Pupils: Pupils are equal, round, and reactive to light.   Cardiovascular:      Rate and Rhythm: Normal rate.      Pulses: Normal pulses.      Heart sounds: No murmur heard.  Pulmonary:      Effort: Pulmonary effort is normal.      Breath sounds: Normal breath sounds.   Abdominal:      General: Abdomen is flat. Bowel sounds are normal. There is no distension.      Palpations: Abdomen is soft.      Tenderness: There is no guarding.   Musculoskeletal:         General: Normal range of motion.      Cervical back: Normal range of motion and neck supple.   Skin:     General: Skin is warm.   Neurological:      General: No focal deficit present.      Mental Status: He is alert.   Psychiatric:         Mood and Affect: Mood normal.         Behavior: Behavior normal.       Significant Labs: All pertinent labs within the past 24 hours have been reviewed.  Recent Lab Results       None            Significant Imaging: I have reviewed all pertinent imaging results/findings within the past 24 hours.      Assessment/Plan:      * COVID-19  Finished antivirals  On RA, SpO2 98%  Asymptomatic   Was admitted with weakness, this was coincidental finding  Cont PT will need placement     Coronary artery disease  Stable continue current therapy      Essential (primary) hypertension  Currently stable continue current therapy      Alzheimer's disease with late onset  Does have some agitation status post Haldol  Continue IV Benadryl   Continue with current therapy.        VTE Risk Mitigation (From admission, onward)         Ordered     IP VTE HIGH RISK PATIENT  Once         12/29/22 [address] [place] [address]  sequential compression device  Until discontinued         12/29/22 1016                Discharge Planning   ELOISE:      Code Status: Full Code   Is the patient medically ready for discharge?:     Reason for patient still in hospital (select all that apply): Treatment  Discharge Plan A: Skilled Nursing Facility          Placement pending        HARLEEN TRIPATHI MD  Department of Hospital Medicine   [facility] - 3rd Floor Medical Telemetry

## 2023-01-03 NOTE — PROGRESS NOTES
"Inpatient Nutrition Evaluation    Admit Date: 12/29/2022   Total duration of encounter: 5 days    Nutrition Recommendation/Prescription     -Continue diet as ordered and tolerated.   -Assistance and encouragement of meals.   -Trial Boost Plus (provides 360 kcal, 14 g protein per serving) to increase po intake.   -Obtain new measured wt.     Nutrition Assessment     Chart Review    Reason Seen: continuous nutrition monitoring    Malnutrition Screening Tool Results   Have you recently lost weight without trying?: No  Have you been eating poorly because of a decreased appetite?: No   MST Score: 0     Diagnosis:  COVID-19  CAD  Essential HTN  Alzheimer's disease    Relevant Medical History: HTN, HLD, Dementia    Nutrition-Related Medications: MV    Nutrition-Related Labs:  12/29/22 CO2 21, Gluc 143, Ca 8.7    Diet Order: Diet Cardiac  Oral Supplement Order: none  Appetite/Oral Intake: poor/0-25% of meals  Factors Affecting Nutritional Intake: impaired cognitive status/motor control and inability to feed self  Food/Moravian/Cultural Preferences: none reported  Food Allergies: none reported    Skin Integrity: bruised (ecchymotic)  Wound(s):   12/30/22- Altered skin integrity   Sacral skin tear, intact  Right posterior elbow, partial thickness tissue loss     Comments    1/3/22 Spoke to RN, pt did not eat much for breakfast. An assist feed. Noted partial thickness tissue loss to elbow, do not see wound care note. Will add Boost Plus for now to encourage intake.     Anthropometrics    Height: 6' 1.62" (187 cm)    Last Weight: 96.6 kg (212 lb 15.4 oz) (EMR wt hx) (01/03/23 1252) Weight Method:  (EMR ht hx)  BMI (Calculated): 27.6  BMI Classification: overweight (BMI 25-29.9)        Ideal Body Weight (IBW), Male: 187.72 lb     % Ideal Body Weight, Male (lb): 113.45 %                          Usual Weight Provided By: EMR weight history    Wt Readings from Last 3 Encounters:   01/03/23 1252 96.6 kg (212 lb 15.4 oz)   03/30/21 " 1357 96.6 kg (212 lb 15.8 oz)   03/26/19 1400 97.1 kg (213 lb 15.7 oz)   03/22/18 1356 98 kg (216 lb 0.8 oz)   10/13/17 1551 93.7 kg (206 lb 9.1 oz)      Weight Change(s) Since Admission:  Admit Weight: 96.6 kg (212 lb 15.4 oz) (EMR wt hx) (01/03/23 1252)  1/3/23 96.6kg from March 2021    Patient Education    Not applicable.    Monitoring & Evaluation     Dietitian will monitor food and beverage intake and weight change.  Nutrition Risk/Follow-Up: low (follow-up in 5-7 days)  Patients assigned 'low nutrition risk' status do not qualify for a full nutritional assessment but will be monitored and re-evaluated in a 5-7 day time period. Please consult if re-evaluation needed sooner.

## 2023-01-03 NOTE — PT/OT/SLP EVAL
Occupational Therapy   Evaluation    Name: Wolfgang Duval  MRN: [id number]  Admitting Diagnosis: COVID-19  Recent Surgery: * No surgery found *      Recommendations:     Discharge Recommendations: nursing facility, skilled  Discharge Equipment Recommendations: Pending progress  Barriers to discharge: medical dx    Assessment:     Wolfgang Duval is a 89 y.o. male with a medical diagnosis of COVID-19. He presents pleasantly confused. Performance deficits affecting function: weakness, impaired endurance, impaired self care skills, impaired functional mobility, gait instability, impaired balance, impaired cognition, decreased safety awareness.      Rehab Prognosis: Good; patient would benefit from acute skilled OT services to address these deficits and reach maximum level of function.       Plan:     Patient to be seen 5 x/week, 6 x/week to address the above listed problems via self-care/home management, therapeutic activities, therapeutic exercises  Plan of Care Expires: 01/17/23  Plan of Care Reviewed with: patient    Subjective     Chief Complaint: None stated.  Patient/Family Comments/goals: None stated.    Occupational Profile:  Living Environment: Pt unable to provide hx 2/2 impaired cognitive skills. Per RN, pt lives at an assisted living facility with his wife.  Previous level of function: Per RN, pt was ambulating with a RW at home.  Equipment Used at Home: walker, rolling  Assistance upon Discharge: Possibly pt's wife? However, pt would benefit from placement post-d/c to increase pt's functional independence and decrease burden of care.    Pain/Comfort:  Pain Rating 1: 0/10    Patients cultural, spiritual, Taoist conflicts given the current situation: no    Objective:     Communicated with: RN prior to session. Patient found HOB elevated upon OT entry to room.    General Precautions: Standard, fall, airborne, droplet, contact (2/2 COVID+)  Respiratory Status: Room air  Vitals:  O2 WNL pre- and  post-activity.    Occupational Performance:    Bed Mobility/Functional Mobility/Transfers:    Pt t/f from supine to sit with mod A provided, requiring assist to lift trunk.  Patient completed Sit <> Stand Transfer with mod A and hand-held assist provided.  Functional Mobility: Pt performed side steps along EOB with min-mod A and HHA provided.    Activities of Daily Living:  Lower Body Dressing: Pt required mod A to doff/don B socks while seated EOB.    Cognitive/Visual Perceptual:  Cognitive/Psychosocial Skills:     -       Oriented to: Person   -       Follows Commands/attention:Follows one-step commands and Follows two-step commands  -       Memory: Impaired  -       Safety awareness/insight to disability: impaired     Physical Exam:  Sensation:    -       Intact  Upper Extremity Range of Motion:     -       Right Upper Extremity: WFL  -       Left Upper Extremity: WFL  Upper Extremity Strength:    -       Right Upper Extremity: WFL  -       Left Upper Extremity: WFL    Patient left HOB elevated with all lines intact, call button in reach, bed alarm on, and B mittens re-donned.    GOALS:   Multidisciplinary Problems       Occupational Therapy Goals          Problem: Occupational Therapy    Goal Priority Disciplines Outcome Interventions   Occupational Therapy Goal     OT, PT/OT Ongoing, Progressing    Description: Goals to be met by: 1/17/23     Patient will increase functional independence with ADLs by performing:    LE Dressing with Stand-by Assistance and Assistive Devices as needed.  Grooming while standing at sink with Stand-by Assistance.  Toileting from bedside commode with Stand-by Assistance for hygiene and clothing management.                          History:     No past medical history on file.    No past surgical history on file.    Time Tracking:     OT Date of Treatment: 1/3/23  OT Start Time: 1527  OT Stop Time: 1546  OT Total Time (min): 19 min    Billable Minutes:Evaluation Mod complexity 19  mins    1/3/2023

## 2023-01-03 NOTE — PLAN OF CARE
Problem: Occupational Therapy  Goal: Occupational Therapy Goal  Description: Goals to be met by: 1/17/23     Patient will increase functional independence with ADLs by performing:    LE Dressing with Stand-by Assistance and Assistive Devices as needed.  Grooming while standing at sink with Stand-by Assistance.  Toileting from bedside commode with Stand-by Assistance for hygiene and clothing management.     Outcome: Ongoing, Progressing

## 2023-01-03 NOTE — NURSING
01/03/23 1100        Altered Skin Integrity 12/30/22 1451 Right posterior Elbow   Date First Assessed/Time First Assessed: 12/30/22 1451   Altered Skin Integrity Present on Admission: yes  Side: Right  Orientation: posterior  Location: Elbow   Wound Image    Dressing Appearance Open to air   Drainage Amount None   Appearance Dry  (scab)   Periwound Area Dry;Intact   Wound Length (cm) 1 cm   Wound Width (cm) 1 cm   Wound Surface Area (cm^2) 1 cm^2   Care Cleansed with:;Wound cleanser   Dressing   (open to air)        Altered Skin Integrity 12/30/22 1452 upper Sacral spine #1 Skin Tear Intact skin with non-blanchable redness of localized area   Date First Assessed/Time First Assessed: 12/30/22 1452   Altered Skin Integrity Present on Admission: yes  Orientation: upper  Location: Sacral spine  Wound Number: #1  Primary Wound Type: Skin Tear  Description of Altered Skin Integrity: Intact skin ...   Wound Image    Dressing Appearance Intact   Drainage Amount None   Appearance Pink;Dry   Care Cleansed with:;Wound cleanser;Skin Barrier   Safety Management   Safety Promotion/Fall Prevention assistive device/personal item within reach;side rails raised x 3;nonskid shoes/socks when out of bed   Patient Rounds bed in low position;bed wheels locked;call light in patient/parent reach;ID band on;clutter free environment maintained;placement of personal items at bedside   Positioning   Body Position position changed independently   Head of Bed (HOB) Positioning HOB at 30-45 degrees     89 y/o male in with covid 19.   Awake alert oriented but total debility.   Needs assist with all activities.     Consulted on sm scab rt elbow dry intact no signs of infection and sacrum.  Care put in place.  Orders to turn q2hrs in place.    Will follow up next week.

## 2023-01-03 NOTE — PLAN OF CARE
01/03/23 1537   Discharge Assessment   Assessment Type Discharge Planning Assessment   Confirmed/corrected address, phone number and insurance Yes   Confirmed Demographics Correct on Facesheet   Source of Information family   Does patient/caregiver understand observation status Yes   Communicated ELOISE with patient/caregiver Yes;Date not available/Unable to determine   People in Home spouse   Facility Arrived From: [facility]   Current cognitive status: Unable to Assess   Walking or Climbing Stairs ambulation difficulty, requires equipment   Equipment Currently Used at Home walker, rolling;cane, straight   Readmission within 30 days? No   Do you have prescription coverage? Yes   Coverage Medicare Part A & B and Blue Cross Blue Shield   Do you have any problems affording any of your prescribed medications? No   Are you on dialysis? No   Do you take coumadin? Yes   Discharge Plan A Skilled Nursing Facility   Discharge Plan B Skilled Nursing Facility   Discharge Plan discussed with: Spouse/sig other

## 2023-01-03 NOTE — PT/OT/SLP EVAL
Physical Therapy Evaluation    Patient Name:  Wolfgang Duval   MRN:  [id number]    Recommendations:     Discharge Recommendations: nursing facility, skilled   Discharge Equipment Recommendations:     Barriers to discharge:  medical status    Assessment:     Wolfgang Duval is a 89 y.o. male admitted with a medical diagnosis of COVID-19, dementia, weakness.  Pt is very pleasantly confused; oriented to name only but agreeable to mobility. He presents with the following impairments/functional limitations: weakness, impaired endurance, impaired functional mobility, gait instability, impaired cognition.    Rehab Prognosis: Good; patient would benefit from acute skilled PT services to address these deficits and reach maximum level of function.    Recent Surgery: * No surgery found *      Plan:     During this hospitalization, patient to be seen 6 x/week to address the identified rehab impairments via gait training, therapeutic activities, therapeutic exercises and progress toward the following goals:    Plan of Care Expires:  01/30/23    Subjective     Chief Complaint: weakness  Patient/Family Comments/goals: return to PLOF  Pain/Comfort:       Patients cultural, spiritual, Jewish conflicts given the current situation: no    Living Environment:  Pt lives in an assisted living facility with spouse and ambulates with a RW at baseline. Pt is confused and a poor historian; RN provided history.  Upon discharge, patient will have assistance from unsure.    Objective:     Communicated with RN prior to session.  Patient found HOB elevated  upon PT entry to room. B josafat on    General Precautions: Standard, airborne, droplet, contact  Respiratory Status: Room air    Exams:  RLE ROM: WFL  RLE Strength: 3+/5  LLE ROM: WFL  LLE Strength: 3+/5    Functional Mobility:  Bed Mobility:     Supine to Sit: moderate assistance  Sit to Supine: moderate assistance  Transfers:     Sit to Stand:  moderate assistance with rolling walker  Gait:  Pt took a few steps towards HOB w/ RW, min A      Patient left HOB elevated with all lines intact. B mittens placed back on    GOALS:   Multidisciplinary Problems       Physical Therapy Goals          Problem: Physical Therapy    Goal Priority Disciplines Outcome Goal Variances Interventions   Physical Therapy Goal     PT, PT/OT Ongoing, Progressing     Description: Goals to be met by: 22     Patient will increase functional independence with mobility by performin. Supine to sit with Stand-by Assistance  2. Sit to supine with Stand-by Assistance  3. Sit to stand transfer with Stand-by Assistance  4. Gait  x 100 feet with Stand-by Assistance using Rolling Walker.                          History:     No past medical history on file.    No past surgical history on file.    Time Tracking:     PT Received On: 23  PT Start Time: 1545     PT Stop Time: 1610  PT Total Time (min): 25 min     Billable Minutes: Evaluation 2023

## 2023-01-04 PROBLEM — R31.9 HEMATURIA: Status: ACTIVE | Noted: 2023-01-04

## 2023-01-04 LAB
APPEARANCE UR: ABNORMAL
BACTERIA #/AREA URNS AUTO: ABNORMAL /HPF
BASOPHILS # BLD AUTO: 0.02 X10(3)/MCL (ref 0–0.2)
BASOPHILS NFR BLD AUTO: 0.1 %
BILIRUB UR QL STRIP.AUTO: ABNORMAL
COLOR UR AUTO: ABNORMAL
EOSINOPHIL # BLD AUTO: 0 X10(3)/MCL (ref 0–0.9)
EOSINOPHIL NFR BLD AUTO: 0 %
ERYTHROCYTE [DISTWIDTH] IN BLOOD BY AUTOMATED COUNT: 12.2 % (ref 11.6–14.4)
GLUCOSE UR QL STRIP.AUTO: ABNORMAL
HCT VFR BLD AUTO: 40.9 % (ref 42–52)
HGB BLD-MCNC: 13.8 GM/DL (ref 14–18)
IMM GRANULOCYTES # BLD AUTO: 0.07 X10(3)/MCL (ref 0–0.04)
IMM GRANULOCYTES NFR BLD AUTO: 0.5 %
KETONES UR QL STRIP.AUTO: ABNORMAL
LEUKOCYTE ESTERASE UR QL STRIP.AUTO: ABNORMAL
LYMPHOCYTES # BLD AUTO: 0.82 X10(3)/MCL (ref 0.6–4.6)
LYMPHOCYTES NFR BLD AUTO: 5.7 %
MCH RBC QN AUTO: 31.2 PG
MCHC RBC AUTO-ENTMCNC: 33.7 MG/DL (ref 33–36)
MCV RBC AUTO: 92.5 FL (ref 80–94)
MONOCYTES # BLD AUTO: 1.54 X10(3)/MCL (ref 0.1–1.3)
MONOCYTES NFR BLD AUTO: 10.7 %
NEUTROPHILS # BLD AUTO: 11.96 X10(3)/MCL (ref 2.1–9.2)
NEUTROPHILS NFR BLD AUTO: 83 %
NITRITE UR QL STRIP.AUTO: ABNORMAL
NRBC BLD AUTO-RTO: 0 % (ref 0–1)
PH UR STRIP.AUTO: ABNORMAL [PH]
PLATELET # BLD AUTO: 196 X10(3)/MCL (ref 140–371)
PMV BLD AUTO: 11.9 FL (ref 9.4–12.4)
PROT UR QL STRIP.AUTO: ABNORMAL
RBC # BLD AUTO: 4.42 X10(6)/MCL (ref 4.7–6.1)
RBC #/AREA URNS AUTO: >3000 /HPF
RBC UR QL AUTO: ABNORMAL
SP GR UR STRIP.AUTO: >=1.03 (ref 1–1.03)
SQUAMOUS #/AREA URNS AUTO: <5 /HPF
UROBILINOGEN UR STRIP-ACNC: ABNORMAL
WBC # SPEC AUTO: 14.4 X10(3)/MCL (ref 4.5–11.5)
WBC #/AREA URNS AUTO: 38 /HPF

## 2023-01-04 PROCEDURE — 99232 SBSQ HOSP IP/OBS MODERATE 35: CPT | Mod: CR,,, | Performed by: INTERNAL MEDICINE

## 2023-01-04 PROCEDURE — 87088 URINE BACTERIA CULTURE: CPT | Performed by: INTERNAL MEDICINE

## 2023-01-04 PROCEDURE — 25000003 PHARM REV CODE 250: Performed by: INTERNAL MEDICINE

## 2023-01-04 PROCEDURE — 85025 COMPLETE CBC W/AUTO DIFF WBC: CPT | Performed by: INTERNAL MEDICINE

## 2023-01-04 PROCEDURE — 21400001 HC TELEMETRY ROOM

## 2023-01-04 PROCEDURE — 97116 GAIT TRAINING THERAPY: CPT | Mod: CQ

## 2023-01-04 PROCEDURE — 87186 SC STD MICRODIL/AGAR DIL: CPT | Performed by: INTERNAL MEDICINE

## 2023-01-04 PROCEDURE — 97535 SELF CARE MNGMENT TRAINING: CPT

## 2023-01-04 PROCEDURE — 99232 PR SUBSEQUENT HOSPITAL CARE,LEVL II: ICD-10-PCS | Mod: CR,,, | Performed by: INTERNAL MEDICINE

## 2023-01-04 PROCEDURE — 51702 INSERT TEMP BLADDER CATH: CPT

## 2023-01-04 PROCEDURE — 25000003 PHARM REV CODE 250: Performed by: STUDENT IN AN ORGANIZED HEALTH CARE EDUCATION/TRAINING PROGRAM

## 2023-01-04 PROCEDURE — 81001 URINALYSIS AUTO W/SCOPE: CPT | Performed by: INTERNAL MEDICINE

## 2023-01-04 PROCEDURE — 36415 COLL VENOUS BLD VENIPUNCTURE: CPT | Performed by: INTERNAL MEDICINE

## 2023-01-04 RX ADMIN — CARVEDILOL 6.25 MG: 3.12 TABLET, FILM COATED ORAL at 09:01

## 2023-01-04 RX ADMIN — TAMSULOSIN HYDROCHLORIDE 0.4 MG: 0.4 CAPSULE ORAL at 09:01

## 2023-01-04 RX ADMIN — Medication: at 08:01

## 2023-01-04 RX ADMIN — THERA TABS 1 TABLET: TAB at 09:01

## 2023-01-04 RX ADMIN — LISINOPRIL 10 MG: 10 TABLET ORAL at 09:01

## 2023-01-04 RX ADMIN — MUPIROCIN: 20 OINTMENT TOPICAL at 08:01

## 2023-01-04 RX ADMIN — CARVEDILOL 6.25 MG: 3.12 TABLET, FILM COATED ORAL at 08:01

## 2023-01-04 RX ADMIN — Medication: at 09:01

## 2023-01-04 RX ADMIN — Medication 81 MG: at 09:01

## 2023-01-04 RX ADMIN — MUPIROCIN: 20 OINTMENT TOPICAL at 09:01

## 2023-01-04 RX ADMIN — CLOPIDOGREL BISULFATE 75 MG: 75 TABLET ORAL at 09:01

## 2023-01-04 NOTE — PT/OT/SLP PROGRESS
"Occupational Therapy   Treatment    Name: Wolfgang Duval  MRN: [id number]  Admitting Diagnosis:  COVID-19       Recommendations:     Discharge Recommendations: nursing facility, skilled  Discharge Equipment Recommendations: shower chair vs TTB  Barriers to discharge: medical dx    Assessment:     Wolfgang Duval is a 89 y.o. male with a medical diagnosis of COVID-19.  He presents with hypotension. RN notified. Performance deficits affecting function are weakness, impaired endurance, impaired self care skills, impaired functional mobility, gait instability, impaired balance, impaired cognition, decreased safety awareness.     Rehab Prognosis:  Good; patient would benefit from acute skilled OT services to address these deficits and reach maximum level of function.       Plan:     Patient to be seen 5 x/week, 6 x/week to address the above listed problems via self-care/home management, therapeutic activities, therapeutic exercises  Plan of Care Expires: 01/17/23  Plan of Care Reviewed with: patient    Subjective     Pain/Comfort:  Pain Rating 1: 0/10    Pt was standing at sink when he began to demonstrate trunk flexion and reported that he was feeling "weak," requiring assist to descend to recliner. Pt's BLEs were elevated, and pt's BP was checked.    Objective:     Communicated with: RN prior to session. Patient found HOB elevated with telemetry, bed alarm, serrano catheter, and B mittens donned upon OT entry to room.    General Precautions: Standard, fall, airborne, droplet, contact (2/2 COVID+)  Respiratory Status: Room air  Vitals:  O2: Initially in mid 80s on BP monitor but quickly increased to WNL.   HR: 56-81  BP:   Post-grooming task at sink (when pt was assisted into recliner with BLEs elevated): 94/61 (initially) and 104/64 (after a couple of minutes of rest)   Lying with HOB elevated: 106/68   R side-lying at end of session:   With HOB slightly elevated: 92/42, 96/58, 93/55, 99/59   With HOB flat: 91/53  In " "Trendelenbur/54  However, pt was asymptomatic.   RN was notified.     Occupational Performance:     Bed Mobility:    Pt t/f from lying with HOB elevated to seated EOB with mod A provided, requiring assist to lift trunk.  Patient completed Sit to Supine with minimum assistance     Functional Mobility/Transfers:  Patient completed Sit <> Stand Transfer with minimum assistance with rolling walker   Functional Mobility: Pt ambulated from EOB to sink in bathroom using RW and transferred from chair > bed via stand pivot technique (with no AD) with min-mod A provided.    Activities of Daily Living:  Grooming: Pt rinsed face using wash cloth with setup/clean up A provided while lying with HOB elevated. Pt combed hair while standing at sink using countertop for support with min A-CGA provided to maintain dynamic standing balance during task. Lastly, pt performed oral hygiene task at end of session with SBA provided while lying with HOB elevated.  Note: Pt was standing at sink when he began to demonstrate trunk flexion and reported that he was feeling "weak," requiring assist to descend to recliner. Pt's BLEs were elevated, and pt's BP was checked.    Patient left right sidelying with all lines intact, call button in reach, bed alarm on, RN notified, B mittens re-donned, wedge positioned underneath L aspect of pt's trunk, pillow positioned between BLEs, and pressure relief boots donned.    GOALS:   Multidisciplinary Problems       Occupational Therapy Goals          Problem: Occupational Therapy    Goal Priority Disciplines Outcome Interventions   Occupational Therapy Goal     OT, PT/OT Ongoing, Progressing    Description: Goals to be met by: 23     Patient will increase functional independence with ADLs by performing:    LE Dressing with Stand-by Assistance and Assistive Devices as needed.  Grooming while standing at sink with Stand-by Assistance.  Toileting from bedside commode with Stand-by Assistance for hygiene " and clothing management.                          Time Tracking:     OT Date of Treatment: 1/4/23  OT Start Time: 1445  OT Stop Time: 1535  OT Total Time (min): 50 min    Billable Minutes: Self Care/Home Management 50 mins    OT/HIRAM: OT     HIRAM Visit Number: 1    1/4/2023

## 2023-01-04 NOTE — PROGRESS NOTES
[facility] - 3rd [facility] Medicine  Progress Note    Patient Name: Wolfgang Duval  MRN: [id number]  Patient Class: IP- Inpatient   Admission Date: 12/29/2022  Length of Stay: 6 days  Attending Physician: Toan Ford II, MD  Primary Care Provider: Primary Doctor No        Subjective:     Principal Problem:COVID-19        HPI:  Wolfgang is a 89-year-old gentleman with a history hypertension hyperlipidemia and some dementia presenting today with some shortness of breath and generalized weakness.  He was on the toilet and wife had to help him up because he was so weak he could not support his own weight.  He was brought to the emergency room was found to have COVID however from a respiratory standpoint stable.  He is dealing with some agitation currently.  Does require restraints and also round of Haldol this morning.  He is being admitted for treatment of COVID-19      Overview/Hospital Course:  No notes on file    Interval History:  s/p remdesivir   He is awake alert not very oriented this is his baseline.    He is very pleasant   Vital signs stable afebrile  He does have blood in the urine/Curran bag.  Question is this from Curran catheter.      Review of Systems   Constitutional: Negative.    HENT: Negative.     Eyes: Negative.    Respiratory:  Negative for cough, chest tightness and shortness of breath.    Cardiovascular:  Negative for chest pain and leg swelling.   Gastrointestinal:  Negative for abdominal pain, diarrhea, nausea and vomiting.   Endocrine: Negative.    Genitourinary: Negative.    Musculoskeletal: Negative.    Skin: Negative.    Allergic/Immunologic: Negative.    Neurological:  Positive for weakness. Negative for headaches.   Hematological: Negative.    Psychiatric/Behavioral: Negative.     Objective:     Vital Signs (Most Recent):  Temp: 98.9 °F (37.2 °C) (01/04/23 1545)  Pulse: 60 (01/04/23 1545)  Resp: 17 (01/04/23 1545)  BP: (!) 94/55 (01/04/23  1545)  SpO2: (!) 93 % (01/04/23 1545)   Vital Signs (24h Range):  Temp:  [97.9 °F (36.6 °C)-99.8 °F (37.7 °C)] 98.9 °F (37.2 °C)  Pulse:  [57-86] 60  Resp:  [17-20] 17  SpO2:  [93 %-96 %] 93 %  BP: ()/(55-84) 94/55     Weight: 96.6 kg (212 lb 15.4 oz) (EMR wt hx)  Body mass index is 27.62 kg/m².    Intake/Output Summary (Last 24 hours) at 1/4/2023 1711  Last data filed at 1/4/2023 1405  Gross per 24 hour   Intake 800 ml   Output 2000 ml   Net -1200 ml        Physical Exam  Eyes:      Pupils: Pupils are equal, round, and reactive to light.   Cardiovascular:      Rate and Rhythm: Normal rate.      Pulses: Normal pulses.      Heart sounds: No murmur heard.  Pulmonary:      Effort: Pulmonary effort is normal.      Breath sounds: Normal breath sounds.   Abdominal:      General: Abdomen is flat. Bowel sounds are normal. There is no distension.      Palpations: Abdomen is soft.      Tenderness: There is no guarding.   Musculoskeletal:         General: Normal range of motion.      Cervical back: Normal range of motion and neck supple.   Skin:     General: Skin is warm.   Neurological:      General: No focal deficit present.      Mental Status: He is alert.   Psychiatric:         Mood and Affect: Mood normal.         Behavior: Behavior normal.       Significant Labs: All pertinent labs within the past 24 hours have been reviewed.  Recent Lab Results         01/04/23  0408   01/03/23  2223        Baso # 0.02   0.02       Basophil % 0.1   0.1       Eos # 0.00   0.00       Eosinophil % 0.0   0.0       Hematocrit 40.9   41.5       Hemoglobin 13.8   14.2       Immature Grans (Abs) 0.07   0.05       Immature Granulocytes 0.5   0.3       Lymph # 0.82   0.50       LYMPH % 5.7   3.2       MCH 31.2   31.4       MCHC 33.7   34.2       MCV 92.5   91.8       Mono # 1.54   1.41       Mono % 10.7   8.9       MPV 11.9   11.4       Neut # 11.96   13.81       Neut % 83.0   87.5       nRBC 0.0   0.0       Platelets 196   203       RBC  4.42   4.52       RDW 12.2   12.1       WBC 14.4   15.8               Significant Imaging: I have reviewed all pertinent imaging results/findings within the past 24 hours.      Assessment/Plan:      * COVID-19  Finished Caitlin GALLEGOS, SpO2 98%  Asymptomatic   Was admitted with weakness, COVID 19 was coincidental finding  Cont PT will need placement     Hematuria  Will start CBI  If this does not clear up will call Urology.      Coronary artery disease  Stable continue current therapy      Essential (primary) hypertension  Currently stable continue current therapy      Alzheimer's disease with late onset  Very pleasant today, would benefit from snf stay   Continue with current therapy.        VTE Risk Mitigation (From admission, onward)         Ordered     IP VTE HIGH RISK PATIENT  Once         12/29/22 1016     Place sequential compression device  Until discontinued         12/29/22 1016                Discharge Planning   ELOISE:      Code Status: Full Code   Is the patient medically ready for discharge?:     Reason for patient still in hospital (select all that apply): Treatment  Discharge Plan A: Skilled Nursing Facility                  HARLEEN TRIPATHI MD  Department of Hospital Medicine   [facility] - 3rd Floor Medical Telemetry

## 2023-01-04 NOTE — SUBJECTIVE & OBJECTIVE
Interval History:  s/p remdesivir   He is awake alert not very oriented this is his baseline.    He is very pleasant   Vital signs stable afebrile  He does have blood in the urine/Curran bag.  Question is this from Curran catheter.      Review of Systems   Constitutional: Negative.    HENT: Negative.     Eyes: Negative.    Respiratory:  Negative for cough, chest tightness and shortness of breath.    Cardiovascular:  Negative for chest pain and leg swelling.   Gastrointestinal:  Negative for abdominal pain, diarrhea, nausea and vomiting.   Endocrine: Negative.    Genitourinary: Negative.    Musculoskeletal: Negative.    Skin: Negative.    Allergic/Immunologic: Negative.    Neurological:  Positive for weakness. Negative for headaches.   Hematological: Negative.    Psychiatric/Behavioral: Negative.     Objective:     Vital Signs (Most Recent):  Temp: 98.9 °F (37.2 °C) (01/04/23 1545)  Pulse: 60 (01/04/23 1545)  Resp: 17 (01/04/23 1545)  BP: (!) 94/55 (01/04/23 1545)  SpO2: (!) 93 % (01/04/23 1545)   Vital Signs (24h Range):  Temp:  [97.9 °F (36.6 °C)-99.8 °F (37.7 °C)] 98.9 °F (37.2 °C)  Pulse:  [57-86] 60  Resp:  [17-20] 17  SpO2:  [93 %-96 %] 93 %  BP: ()/(55-84) 94/55     Weight: 96.6 kg (212 lb 15.4 oz) (EMR wt hx)  Body mass index is 27.62 kg/m².    Intake/Output Summary (Last 24 hours) at 1/4/2023 1711  Last data filed at 1/4/2023 1405  Gross per 24 hour   Intake 800 ml   Output 2000 ml   Net -1200 ml        Physical Exam  Eyes:      Pupils: Pupils are equal, round, and reactive to light.   Cardiovascular:      Rate and Rhythm: Normal rate.      Pulses: Normal pulses.      Heart sounds: No murmur heard.  Pulmonary:      Effort: Pulmonary effort is normal.      Breath sounds: Normal breath sounds.   Abdominal:      General: Abdomen is flat. Bowel sounds are normal. There is no distension.      Palpations: Abdomen is soft.      Tenderness: There is no guarding.   Musculoskeletal:         General: Normal range  of motion.      Cervical back: Normal range of motion and neck supple.   Skin:     General: Skin is warm.   Neurological:      General: No focal deficit present.      Mental Status: He is alert.   Psychiatric:         Mood and Affect: Mood normal.         Behavior: Behavior normal.       Significant Labs: All pertinent labs within the past 24 hours have been reviewed.  Recent Lab Results         01/04/23  0408   01/03/23  2223        Baso # 0.02   0.02       Basophil % 0.1   0.1       Eos # 0.00   0.00       Eosinophil % 0.0   0.0       Hematocrit 40.9   41.5       Hemoglobin 13.8   14.2       Immature Grans (Abs) 0.07   0.05       Immature Granulocytes 0.5   0.3       Lymph # 0.82   0.50       LYMPH % 5.7   3.2       MCH 31.2   31.4       MCHC 33.7   34.2       MCV 92.5   91.8       Mono # 1.54   1.41       Mono % 10.7   8.9       MPV 11.9   11.4       Neut # 11.96   13.81       Neut % 83.0   87.5       nRBC 0.0   0.0       Platelets 196   203       RBC 4.42   4.52       RDW 12.2   12.1       WBC 14.4   15.8               Significant Imaging: I have reviewed all pertinent imaging results/findings within the past 24 hours.

## 2023-01-04 NOTE — PT/OT/SLP PROGRESS
"Physical Therapy Treatment    Patient Name:  Wolfgang Duval   MRN:  [id number]    Recommendations:     Discharge Recommendations: nursing facility, skilled  Discharge Equipment Recommendations: none  Barriers to discharge:       Assessment:     Wolfgang Duval is a 89 y.o. male admitted with a medical diagnosis of COVID-19.  He presents with the following impairments/functional limitations: weakness, impaired endurance, impaired functional mobility, gait instability, impaired cognition.    Rehab Prognosis: Good; patient would benefit from acute skilled PT services to address these deficits and reach maximum level of function.    Recent Surgery: * No surgery found *      Plan:     During this hospitalization, patient to be seen 6 x/week to address the identified rehab impairments via gait training, therapeutic activities, therapeutic exercises and progress toward the following goals:    Plan of Care Expires:  01/30/23    Subjective     Chief Complaint:   Patient/Family Comments/goals:   Pain/Comfort:         Objective:     Communicated with NSG prior to session.  Patient found HOB elevated  upon PTA entry to room.     General Precautions: Standard, droplet, airborne, contact  Orthopedic Precautions: N/A  Braces: N/A  Respiratory Status: Room air, SPO2 97%    Pt reports feeling "weak" after standing several minutes at sink. Pt began leaning forward, PTA and OT assisted pt into recliner, reclined pt and assessed BP 94/61. After a couple of minutes in recliner /64. After pt transferred BTB, /68.     Functional Mobility:    Bed: July supine < - > sit EOB  T/F: July sit <-> stand with RW   Static Standing balance: July for balance while pt stood at sink to comb hair  Gait with RW: Pt amb for approximately 15 ft. With step through gait pattern and flex posture, cues to correct. ModA to July for balance.         Patient left HOB elevated with all lines intact, call button in reach, and OT present..    GOALS: "   Multidisciplinary Problems       Physical Therapy Goals          Problem: Physical Therapy    Goal Priority Disciplines Outcome Goal Variances Interventions   Physical Therapy Goal     PT, PT/OT Ongoing, Progressing     Description: Goals to be met by: 22     Patient will increase functional independence with mobility by performin. Supine to sit with Stand-by Assistance  2. Sit to supine with Stand-by Assistance  3. Sit to stand transfer with Stand-by Assistance  4. Gait  x 100 feet with Stand-by Assistance using Rolling Walker.                          Time Tracking:     PT Received On:    PT Start Time: 1442     PT Stop Time: 1505  PT Total Time (min): 23 min     Billable Minutes: Gait Training 23    Treatment Type: Treatment  PT/PTA: PTA     PTA Visit Number: 2023

## 2023-01-04 NOTE — ASSESSMENT & PLAN NOTE
Finished remdesivirOn RA, SpO2 98%  Asymptomatic   Was admitted with weakness, COVID 19 was coincidental finding  Cont PT will need placement

## 2023-01-05 PROCEDURE — 25000003 PHARM REV CODE 250: Performed by: STUDENT IN AN ORGANIZED HEALTH CARE EDUCATION/TRAINING PROGRAM

## 2023-01-05 PROCEDURE — 21400001 HC TELEMETRY ROOM

## 2023-01-05 PROCEDURE — 99232 PR SUBSEQUENT HOSPITAL CARE,LEVL II: ICD-10-PCS | Mod: CR,,, | Performed by: INTERNAL MEDICINE

## 2023-01-05 PROCEDURE — 97530 THERAPEUTIC ACTIVITIES: CPT | Mod: CQ

## 2023-01-05 PROCEDURE — 97535 SELF CARE MNGMENT TRAINING: CPT

## 2023-01-05 PROCEDURE — 97110 THERAPEUTIC EXERCISES: CPT | Mod: CQ

## 2023-01-05 PROCEDURE — 99232 SBSQ HOSP IP/OBS MODERATE 35: CPT | Mod: CR,,, | Performed by: INTERNAL MEDICINE

## 2023-01-05 RX ADMIN — CLOPIDOGREL BISULFATE 75 MG: 75 TABLET ORAL at 09:01

## 2023-01-05 RX ADMIN — Medication: at 09:01

## 2023-01-05 RX ADMIN — CARVEDILOL 6.25 MG: 3.12 TABLET, FILM COATED ORAL at 09:01

## 2023-01-05 RX ADMIN — TAMSULOSIN HYDROCHLORIDE 0.4 MG: 0.4 CAPSULE ORAL at 09:01

## 2023-01-05 RX ADMIN — LISINOPRIL 10 MG: 10 TABLET ORAL at 09:01

## 2023-01-05 RX ADMIN — Medication 81 MG: at 09:01

## 2023-01-05 RX ADMIN — Medication: at 08:01

## 2023-01-05 RX ADMIN — MUPIROCIN: 20 OINTMENT TOPICAL at 09:01

## 2023-01-05 RX ADMIN — CARVEDILOL 6.25 MG: 3.12 TABLET, FILM COATED ORAL at 08:01

## 2023-01-05 RX ADMIN — THERA TABS 1 TABLET: TAB at 09:01

## 2023-01-05 NOTE — PLAN OF CARE
Problem: Adult Inpatient Plan of Care  Goal: Plan of Care Review  Outcome: Ongoing, Progressing  Goal: Absence of Hospital-Acquired Illness or Injury  Outcome: Ongoing, Progressing  Goal: Optimal Comfort and Wellbeing  Outcome: Ongoing, Progressing  Goal: Readiness for Transition of Care  Outcome: Ongoing, Progressing     Problem: Fall Injury Risk  Goal: Absence of Fall and Fall-Related Injury  Outcome: Ongoing, Progressing     Problem: Skin Injury Risk Increased  Goal: Skin Health and Integrity  Outcome: Ongoing, Progressing     Problem: Impaired Wound Healing  Goal: Optimal Wound Healing  Outcome: Ongoing, Progressing     Problem: Infection  Goal: Absence of Infection Signs and Symptoms  Outcome: Ongoing, Progressing

## 2023-01-05 NOTE — CONSULTS
89 yo admitted with weakness and SOB.   Tested positive for covid on admission.  He has dementia with confusion.  He has indwelling serrano and been pulling on it.   Developed gross blood.   Currently getting a  3 way for CBI.   Its 18 Fr.   If clots become a problem, it may need to be changed to 24fr.   His urine was clear on admit.  Repeat UA culture pending.  Agree with CBI.   No intervention planned as likley serrano trauma and should resolve  Will follow  Thank you

## 2023-01-05 NOTE — PROGRESS NOTES
.UROLOGY  PROGRESS  NOTE    Wolfgang Duval 10/3/1932  [id number]  1/5/2023    Vital signs stable, afebrile  No new labs today  Urine clearing nicely with CBI    Intake/Output:  No intake/output data recorded.  I/O last 3 completed shifts:  In: 1120 [P.O.:1120]  Out: 2900 [Urine:2900]       Exam:    NAD  Resp unlabored   norman urine draining to  bag with minimal CBI      Recent Results (from the past 24 hour(s))   Urinalysis, Reflex to Urine Culture Urine, Clean Catch    Collection Time: 01/04/23  5:00 PM    Specimen: Urine   Result Value Ref Range    Color, UA Red (A) Yellow, Light-Yellow, Dark Yellow, Norman, Straw    Appearance, UA Turbid (A) Clear    Specific Gravity, UA >=1.030 1.001 - 1.030    pH, UA      Protein, UA      Glucose, UA      Ketones, UA      Blood, UA      Bilirubin, UA      Urobilinogen, UA      Nitrites, UA      Leukocyte Esterase, UA     Urinalysis, Microscopic    Collection Time: 01/04/23  5:00 PM   Result Value Ref Range    RBC, UA >3,000 (H) <=5 /HPF    WBC, UA 38 (H) <=5 /HPF    Squamous Epithelial Cells, UA <5 <=5 /HPF    Bacteria, UA None Seen None Seen, Rare, Occasional /HPF         Assessment:  COVID  Gross hematuria secondary to Curran trauma      Plan:  Continue to wean off CBI  Restart as needed   Will follow    RASTA Shukla

## 2023-01-05 NOTE — PROGRESS NOTES
[facility] - 3rd [facility] Medicine  Progress Note    Patient Name: Wolfgang Duval  MRN: [id number]  Patient Class: IP- Inpatient   Admission Date: 12/29/2022  Length of Stay: 7 days  Attending Physician: Toan Ford II, MD  Primary Care Provider: Primary Doctor No        Subjective:     Principal Problem:COVID-19        HPI:  Wolfgang is a 89-year-old gentleman with a history hypertension hyperlipidemia and some dementia presenting today with some shortness of breath and generalized weakness.  He was on the toilet and wife had to help him up because he was so weak he could not support his own weight.  He was brought to the emergency room was found to have COVID however from a respiratory standpoint stable.  He is dealing with some agitation currently.  Does require restraints and also round of Haldol this morning.  He is being admitted for treatment of COVID-19      Overview/Hospital Course:  No notes on file    Interval History:  back to baseline  Still weak  Finished remdesivir  Awaiting placement  CBI started yesterday urine is much improved  Seen by Urology likely from Curran trauma      Review of Systems   Constitutional: Negative.    HENT: Negative.     Eyes: Negative.    Respiratory:  Negative for cough, chest tightness and shortness of breath.    Cardiovascular:  Negative for chest pain and leg swelling.   Gastrointestinal:  Negative for abdominal pain, diarrhea, nausea and vomiting.   Endocrine: Negative.    Genitourinary: Negative.    Musculoskeletal: Negative.    Skin: Negative.    Allergic/Immunologic: Negative.    Neurological:  Positive for weakness. Negative for headaches.   Hematological: Negative.    Psychiatric/Behavioral: Negative.     Objective:     Vital Signs (Most Recent):  Temp: 97.7 °F (36.5 °C) (01/05/23 1624)  Pulse: (!) 58 (01/05/23 1624)  Resp: 19 (01/05/23 1624)  BP: 114/64 (01/05/23 1624)  SpO2: 99 % (01/05/23 1624)   Vital Signs (24h  Range):  Temp:  [97.3 °F (36.3 °C)-98.1 °F (36.7 °C)] 97.7 °F (36.5 °C)  Pulse:  [58-69] 58  Resp:  [18-19] 19  SpO2:  [92 %-99 %] 99 %  BP: ()/(58-77) 114/64     Weight: 96.6 kg (212 lb 15.4 oz) (EMR wt hx)  Body mass index is 27.62 kg/m².    Intake/Output Summary (Last 24 hours) at 1/5/2023 1704  Last data filed at 1/5/2023 1337  Gross per 24 hour   Intake 800 ml   Output 800 ml   Net 0 ml        Physical Exam  Eyes:      Pupils: Pupils are equal, round, and reactive to light.   Cardiovascular:      Rate and Rhythm: Normal rate.      Pulses: Normal pulses.      Heart sounds: No murmur heard.  Pulmonary:      Effort: Pulmonary effort is normal.      Breath sounds: Normal breath sounds.   Abdominal:      General: Abdomen is flat. Bowel sounds are normal. There is no distension.      Palpations: Abdomen is soft.      Tenderness: There is no guarding.   Musculoskeletal:         General: Normal range of motion.      Cervical back: Normal range of motion and neck supple.   Skin:     General: Skin is warm.   Neurological:      General: No focal deficit present.      Mental Status: He is alert.   Psychiatric:         Mood and Affect: Mood normal.         Behavior: Behavior normal.       Significant Labs: All pertinent labs within the past 24 hours have been reviewed.  Recent Lab Results       None            Significant Imaging: I have reviewed all pertinent imaging results/findings within the past 24 hours.      Assessment/Plan:      * COVID-19  Finished Caitlin RA, SpO2 98%  Asymptomatic   Was admitted with weakness, COVID 19 was coincidental finding  Cont PT will need placement     Hematuria  Cbi, urine clearing up      Coronary artery disease  Stable continue current therapy      Essential (primary) hypertension  Currently stable continue current therapy      Alzheimer's disease with late onset  Very pleasant today, would benefit from snf stay   Continue with current therapy.        VTE Risk Mitigation  (From admission, onward)         Ordered     IP VTE HIGH RISK PATIENT  Once         12/29/22 1016     Place sequential compression device  Until discontinued         12/29/22 1016                Discharge Planning   ELOISE:      Code Status: Full Code   Is the patient medically ready for discharge?:     Reason for patient still in hospital (select all that apply): Treatment  Discharge Plan A: Skilled Nursing Facility            Placement pending      HARLEEN TRIPATHI MD  Department of Hospital Medicine   [facility] - 3rd Floor Medical Telemetry

## 2023-01-05 NOTE — PLAN OF CARE
Patient accepted into [facility] for SNF, still finishing up additional paperwork, patient should be ready for discharge on tomorrow.

## 2023-01-05 NOTE — PT/OT/SLP PROGRESS
Occupational Therapy   Treatment    Name: Wolfgang Duval  MRN: [id number]  Admitting Diagnosis:  COVID-19       Recommendations:     Discharge Recommendations: nursing facility, skilled (Note: Pt has been accepted into [facility] for SNF)  Discharge Equipment Recommendations: shower chair vs TTB  Barriers to discharge: medical dx    Assessment:     Wolfgang Duval is a 89 y.o. male with a medical diagnosis of COVID-19. He presents with hypotension. RN notified. Performance deficits affecting function are weakness, impaired endurance, impaired self care skills, impaired functional mobility, gait instability, impaired balance, impaired cognition, decreased safety awareness.     Rehab Prognosis:  Good; patient would benefit from acute skilled OT services to address these deficits and reach maximum level of function.       Plan:     Patient to be seen 5 x/week, 6 x/week to address the above listed problems via self-care/home management, therapeutic activities, therapeutic exercises  Plan of Care Expires: 01/17/23  Plan of Care Reviewed with: patient    Subjective     Pain/Comfort:  Pain Rating 1: 0/10    Objective:     Communicated with: RN prior to session. Patient found HOB elevated with serrano catheter, telemetry, bed alarm, serrano catheter, CBI, L wedge, pressure relief boots, and B mittens donned upon OT entry to room.    General Precautions: Standard, fall  Respiratory Status: Room air     Occupational Performance:     Bed Mobility:    Pt t/f from lying with HOB elevated to seated EOB with SBA provided.  Patient completed Sit to Supine with SBA provided.    Functional Mobility/Transfers:  Patient completed Sit <> Stand Transfers using RW with mod A progressing to min A provided and vc/tcs provided to assume upright standing posture and for proper hand/foot placement.  Functional Mobility: Pt performed side steps along EOB with min A provided, using RW.    Activities of Daily Living:  Grooming: Pt rinsed face using  wash cloth, combed hair, and performed oral hygiene task while seated EOB with SBA provided.    Treatment:  Pt performed sit to stand exercise x5 using RW with mod A progressing to min A provided and vc/tcs provided to assume upright standing posture and for proper hand/foot placement. Pt participated in therapeutic exercise to increase strength and endurance of BU/LE and standing balance needed for ADL/IADL participation.    Patient left HOB elevated with all lines intact, call button in reach, bed alarm on, B mittens re-donned, wedge positioned underneath L aspect of pt's trunk, and pressure relief boots donned.    GOALS:   Multidisciplinary Problems       Occupational Therapy Goals          Problem: Occupational Therapy    Goal Priority Disciplines Outcome Interventions   Occupational Therapy Goal     OT, PT/OT Ongoing, Progressing    Description: Goals to be met by: 1/17/23     Patient will increase functional independence with ADLs by performing:    LE Dressing with Stand-by Assistance and Assistive Devices as needed.  Grooming while standing at sink with Stand-by Assistance.  Toileting from bedside commode with Stand-by Assistance for hygiene and clothing management.                          Time Tracking:     OT Date of Treatment: 1/5/23  OT Start Time: 1245  OT Stop Time: 1320  OT Total Time (min): 35 min    Billable Minutes: Self Care/Home Management 35 mins    OT/HIRAM: OT     HIRAM Visit Number: 2    1/5/2023

## 2023-01-05 NOTE — SUBJECTIVE & OBJECTIVE
Interval History:  back to baseline  Still weak  Finished remdesivir  Awaiting placement  CBI started yesterday urine is much improved  Seen by Urology likely from Curran trauma      Review of Systems   Constitutional: Negative.    HENT: Negative.     Eyes: Negative.    Respiratory:  Negative for cough, chest tightness and shortness of breath.    Cardiovascular:  Negative for chest pain and leg swelling.   Gastrointestinal:  Negative for abdominal pain, diarrhea, nausea and vomiting.   Endocrine: Negative.    Genitourinary: Negative.    Musculoskeletal: Negative.    Skin: Negative.    Allergic/Immunologic: Negative.    Neurological:  Positive for weakness. Negative for headaches.   Hematological: Negative.    Psychiatric/Behavioral: Negative.     Objective:     Vital Signs (Most Recent):  Temp: 97.7 °F (36.5 °C) (01/05/23 1624)  Pulse: (!) 58 (01/05/23 1624)  Resp: 19 (01/05/23 1624)  BP: 114/64 (01/05/23 1624)  SpO2: 99 % (01/05/23 1624)   Vital Signs (24h Range):  Temp:  [97.3 °F (36.3 °C)-98.1 °F (36.7 °C)] 97.7 °F (36.5 °C)  Pulse:  [58-69] 58  Resp:  [18-19] 19  SpO2:  [92 %-99 %] 99 %  BP: ()/(58-77) 114/64     Weight: 96.6 kg (212 lb 15.4 oz) (EMR wt hx)  Body mass index is 27.62 kg/m².    Intake/Output Summary (Last 24 hours) at 1/5/2023 1704  Last data filed at 1/5/2023 1337  Gross per 24 hour   Intake 800 ml   Output 800 ml   Net 0 ml        Physical Exam  Eyes:      Pupils: Pupils are equal, round, and reactive to light.   Cardiovascular:      Rate and Rhythm: Normal rate.      Pulses: Normal pulses.      Heart sounds: No murmur heard.  Pulmonary:      Effort: Pulmonary effort is normal.      Breath sounds: Normal breath sounds.   Abdominal:      General: Abdomen is flat. Bowel sounds are normal. There is no distension.      Palpations: Abdomen is soft.      Tenderness: There is no guarding.   Musculoskeletal:         General: Normal range of motion.      Cervical back: Normal range of motion and  neck supple.   Skin:     General: Skin is warm.   Neurological:      General: No focal deficit present.      Mental Status: He is alert.   Psychiatric:         Mood and Affect: Mood normal.         Behavior: Behavior normal.       Significant Labs: All pertinent labs within the past 24 hours have been reviewed.  Recent Lab Results       None            Significant Imaging: I have reviewed all pertinent imaging results/findings within the past 24 hours.

## 2023-01-05 NOTE — PT/OT/SLP PROGRESS
Physical Therapy Treatment    Patient Name:  Wolfgang Duval   MRN:  [id number]    Recommendations:     Discharge Recommendations: nursing facility, skilled  Discharge Equipment Recommendations: none  Barriers to discharge: Decreased caregiver support    Assessment:     Wolfgang Duval is a 89 y.o. male admitted with a medical diagnosis of COVID-19.  He presents with the following impairments/functional limitations: weakness, impaired endurance, impaired self care skills, impaired functional mobility, gait instability, impaired balance, decreased safety awareness, impaired cognition .    Rehab Prognosis: Fair; patient would benefit from acute skilled PT services to address these deficits and reach maximum level of function.    Recent Surgery: * No surgery found *      Plan:     During this hospitalization, patient to be seen 6 x/week to address the identified rehab impairments via gait training, therapeutic activities, therapeutic exercises and progress toward the following goals:    Plan of Care Expires:  01/30/23    Subjective     Chief Complaint: none   Patient/Family Comments/goals:   Pain/Comfort:  Pain Rating 1: 0/10      Objective:     Communicated with nursing  prior to session okayed tx but not OOB or in chair secondary to bladder flush .  Patient found HOB elevated with telemetry, peripheral IV, Other (comments) (B mittens removed and reapplied pt receiving cont . bladder flush per nursing in bed HOB elevated) upon PT entry to room.     General Precautions: Standard, fall  Orthopedic Precautions: N/A  Braces: N/A  Respiratory Status: Room air     Functional Mobility:  Bed Mobility:     Supine to Sit: minimum assistance  Sit to Supine: minimum assistance      Treatment & Education:  Pt performed seating EOB exercises 2 sets 10-15reps pt poor motivation with TE    Patient left HOB elevated with call button in reach, bed alarm on, nursing  notified, and pt with  and B mittens reapplied after tx pt henry tx   ..    GOALS:   Multidisciplinary Problems       Physical Therapy Goals          Problem: Physical Therapy    Goal Priority Disciplines Outcome Goal Variances Interventions   Physical Therapy Goal     PT, PT/OT Ongoing, Progressing     Description: Goals to be met by: 22     Patient will increase functional independence with mobility by performin. Supine to sit with Stand-by Assistance  2. Sit to supine with Stand-by Assistance  3. Sit to stand transfer with Stand-by Assistance  4. Gait  x 100 feet with Stand-by Assistance using Rolling Walker.                          Time Tracking:     PT Received On: 23  PT Start Time: 1110     PT Stop Time: 1135  PT Total Time (min): 25 min     Billable Minutes: Therapeutic Activity 15min and Therapeutic Exercise 10min    Treatment Type: Treatment  PT/PTA: PTA     PTA Visit Number: 2     2023

## 2023-01-06 VITALS
HEART RATE: 56 BPM | TEMPERATURE: 98 F | OXYGEN SATURATION: 94 % | SYSTOLIC BLOOD PRESSURE: 112 MMHG | HEIGHT: 74 IN | BODY MASS INDEX: 27.33 KG/M2 | RESPIRATION RATE: 18 BRPM | WEIGHT: 212.94 LBS | DIASTOLIC BLOOD PRESSURE: 69 MMHG

## 2023-01-06 DIAGNOSIS — R31.9 HEMATURIA, UNSPECIFIED TYPE: Primary | ICD-10-CM

## 2023-01-06 PROCEDURE — 99239 HOSP IP/OBS DSCHRG MGMT >30: CPT | Mod: CR,,, | Performed by: INTERNAL MEDICINE

## 2023-01-06 PROCEDURE — 99239 PR HOSPITAL DISCHARGE DAY,>30 MIN: ICD-10-PCS | Mod: CR,,, | Performed by: INTERNAL MEDICINE

## 2023-01-06 PROCEDURE — 25000003 PHARM REV CODE 250: Performed by: STUDENT IN AN ORGANIZED HEALTH CARE EDUCATION/TRAINING PROGRAM

## 2023-01-06 RX ORDER — CIPROFLOXACIN 250 MG/1
250 TABLET, FILM COATED ORAL 2 TIMES DAILY
Qty: 6 TABLET | Refills: 0 | Status: SHIPPED | OUTPATIENT
Start: 2023-01-06 | End: 2023-01-09

## 2023-01-06 RX ADMIN — Medication: at 08:01

## 2023-01-06 RX ADMIN — Medication 81 MG: at 08:01

## 2023-01-06 RX ADMIN — LISINOPRIL 10 MG: 10 TABLET ORAL at 08:01

## 2023-01-06 RX ADMIN — CLOPIDOGREL BISULFATE 75 MG: 75 TABLET ORAL at 08:01

## 2023-01-06 RX ADMIN — CARVEDILOL 6.25 MG: 3.12 TABLET, FILM COATED ORAL at 08:01

## 2023-01-06 RX ADMIN — THERA TABS 1 TABLET: TAB at 08:01

## 2023-01-06 RX ADMIN — MUPIROCIN: 20 OINTMENT TOPICAL at 08:01

## 2023-01-06 RX ADMIN — TAMSULOSIN HYDROCHLORIDE 0.4 MG: 0.4 CAPSULE ORAL at 08:01

## 2023-01-06 NOTE — NURSING
Pt discharged to [facility], pt's telemetry and IV discontinued. PT left via stretcher with [facility]. Discharge instructions provided to patient, Pt left with paper work. Nurse called in reports to Veronika at [facility].

## 2023-01-06 NOTE — DISCHARGE SUMMARY
[facility] - 3rd [facility] Medicine  Discharge Summary      Patient Name: Wolfgang Duval  MRN: [id number]  Admission Date: 12/29/2022  Hospital Length of Stay: 8 days  Discharge Date and Time:  01/06/2023 12:37 PM  Attending Physician: Harleen Tripathi II, MD   Discharging Provider: HARLEEN TRIPATHI MD  Primary Care Provider: Primary Doctor No        HPI: Wolfgang is a 89-year-old gentleman with a history hypertension hyperlipidemia and some dementia presenting today with some shortness of breath and generalized weakness.  He was on the toilet and wife had to help him up because he was so weak he could not support his own weight.  He was brought to the emergency room was found to have COVID however from a respiratory standpoint stable.  He is dealing with some agitation currently.  Does require restraints and also round of Haldol this morning.  He is being admitted for treatment of COVID-19       * No surgery found *      Hospital Course:  Jamir is a 89-year-old gentleman with a history of pleasant dementia hypertension hyperlipidemia coronary disease presented with some generalized weakness and shortness of breath although his SpO2 was normal.  He was coincidentally diagnosed with COVID and treated with 5 days of remdesivir.  He remained asymptomatic.  He did have a Curran catheter that he kept pulling that, as result developed some hematuria.  Placed him on CBI which cleared up his urine urinalysis did reveal 100,000 colonies of E coli.  Seen in Cipro 250 b.i.d. for 3 days.  Because of the generalized weakness he was not able to participate with physical therapy much.  Spoke with the wife and she agreed that significant would be appropriate.  He has been accepted and will discharge in stable condition.  Please see medicine reconciliation for any changes and further recommendations.  Consults:   Consults (From admission, onward)          Status Ordering Provider     Inpatient  consult to Urology  Once        Provider:  Alban Mo MD    Completed HARLEEN TRIPATHI II     Inpatient consult to Social Work/Case Management  Once        Provider:  (Not yet assigned)    Acknowledged HARLEEN TRIPATHI II            Final Active Diagnoses:    Diagnosis Date Noted POA    PRINCIPAL PROBLEM:  COVID-19 [U07.1] 12/29/2022 Unknown    Hematuria [R31.9] 01/04/2023 Unknown    Coronary artery disease [I25.10] 12/29/2022 Unknown    Alzheimer's disease with late onset [G30.1, F02.80] 03/10/2022 Yes    Essential (primary) hypertension [I10] 03/10/2022 Yes    Hyperlipidemia, unspecified [E78.5] 03/10/2022 Yes      Problems Resolved During this Admission:      Discharged Condition: good    Disposition: Skilled Nursing Facility    Follow Up:    Patient Instructions:   No discharge procedures on file.  Medications:  Reconciled Home Medications:      Medication List        CONTINUE taking these medications      aspirin 81 MG Chew  Take 1 tablet by mouth once daily.     atorvastatin 20 MG tablet  Commonly known as: LIPITOR  Take 20 mg by mouth every evening.     carvediloL 6.25 MG tablet  Commonly known as: COREG  Take 3.125 mg by mouth 2 (two) times daily.     clopidogreL 75 mg tablet  Commonly known as: PLAVIX  Take 75 mg by mouth once daily.     lisinopriL 10 MG tablet  Take 10 mg by mouth once daily.     multivitamin capsule  Take 1 capsule by mouth once daily.     tamsulosin 0.4 mg Cap  Commonly known as: FLOMAX  Take 1 capsule by mouth once daily.            ASK your doctor about these medications      ciprofloxacin HCl 250 MG tablet  Commonly known as: CIPRO  Take 1 tablet (250 mg total) by mouth 2 (two) times daily. for 3 days              Significant Diagnostic Studies: Labs: All labs within the past 24 hours have been reviewed    Pending Diagnostic Studies:       None          Indwelling Lines/Drains at time of discharge:   Lines/Drains/Airways       None                   Time spent on the  discharge of patient: 33 minutes         HARLEEN TRIPATHI MD  Department of Hospital Medicine  [facility] - 3rd Floor Medical Telemetry

## 2023-01-06 NOTE — PLAN OF CARE
"Марина communicated NH is ready for pt- Dr. Liliana TORRES rounded and d/c'ed serrano and states pt can d/c to the NH.  D/C orders in place./  Марина states she will inform nurse Morales of person and number to call report and that I could call for [facility] .  Called [facility] ph# [phone]- placed in "Will Call" status- notified pt's spouse MS. Husain ph# [phone]    1202  Nurse Morales reports Dr. Liliana TORRES called in po antibiotics to pt's pharmacy and spouse will .  Andrew also notified NH that antibiotics were prdered.  Nurse Montoya reports pt can be taken out of "Will Call"- contacted [facility] to  pt.    1220  Attempted to speak with Ms. Duval- went to voice mail and message left [facility]    [facility]30  Ms. Duval returned call and was updated- also informed that per nurse Andrew Hyde called in po antibiotics to [facility] and they will contact when filled.  Once you  take to the NH- Nurse Morales made nurse at NH aware.    1345  Called [facility] for transport states ETA another hour r/t emergency transports.  Updated pt's nurse Morales.  "

## 2023-01-06 NOTE — PROGRESS NOTES
.UROLOGY  PROGRESS  NOTE    Wolfgang Duval 10/3/1932  [id number]  1/6/2023    Curran removed a few hours ago and he has not voided yet, bladder scan with only 119 mL's. Patient denies suprapubic discomfort.      Intake/Output:  I/O this shift:  In: 360 [P.O.:360]  Out: -   I/O last 3 completed shifts:  In: 920 [P.O.:920]  Out: 1400 [Urine:1400]       Exam:    NAD  Card RRR  Resp unlabored   deferred      No results found for this or any previous visit (from the past 24 hour(s)).      Assessment:  COVID  AUR with hematuria      Plan:  He is being discharged back to the nursing home. He has not voided yet and only has 119 mL's in his bladder. Would have nursing home bladder scan him at the 6-8 hour jorden from removing Curran and replace indwelling if >400mL's. If Curran is replaced, he needs to follow up with Dr. Alban Mo in 2 weeks.    Vesna Khan, PARAGP

## 2023-01-07 LAB
BACTERIA UR CULT: ABNORMAL
BACTERIA UR CULT: ABNORMAL

## 2023-01-11 ENCOUNTER — LAB REQUISITION (OUTPATIENT)
Dept: LAB | Facility: HOSPITAL | Age: 88
End: 2023-01-11
Payer: MEDICARE

## 2023-01-11 DIAGNOSIS — E78.5 HYPERLIPIDEMIA, UNSPECIFIED: ICD-10-CM

## 2023-01-11 LAB
ALBUMIN SERPL-MCNC: 3.2 G/DL (ref 3.4–4.8)
ALBUMIN/GLOB SERPL: 1.2 RATIO (ref 1.1–2)
ALP SERPL-CCNC: 77 UNIT/L (ref 40–150)
ALT SERPL-CCNC: 44 UNIT/L (ref 0–55)
AST SERPL-CCNC: 39 UNIT/L (ref 5–34)
BASOPHILS # BLD AUTO: 0.05 X10(3)/MCL (ref 0–0.2)
BASOPHILS NFR BLD AUTO: 0.3 %
BILIRUBIN DIRECT+TOT PNL SERPL-MCNC: 1.6 MG/DL
BUN SERPL-MCNC: 44 MG/DL (ref 8.4–25.7)
CALCIUM SERPL-MCNC: 9 MG/DL (ref 8.8–10)
CHLORIDE SERPL-SCNC: 107 MMOL/L (ref 98–111)
CHOLEST SERPL-MCNC: 100 MG/DL
CHOLEST/HDLC SERPL: 4 {RATIO} (ref 0–5)
CO2 SERPL-SCNC: 27 MMOL/L (ref 23–31)
CREAT SERPL-MCNC: 1.17 MG/DL (ref 0.73–1.18)
DEPRECATED CALCIDIOL+CALCIFEROL SERPL-MC: 35.8 NG/ML (ref 30–80)
EOSINOPHIL # BLD AUTO: 0.21 X10(3)/MCL (ref 0–0.9)
EOSINOPHIL NFR BLD AUTO: 1.5 %
ERYTHROCYTE [DISTWIDTH] IN BLOOD BY AUTOMATED COUNT: 12.3 % (ref 11.5–17)
GFR SERPLBLD CREATININE-BSD FMLA CKD-EPI: 59 MLS/MIN/1.73/M2
GLOBULIN SER-MCNC: 2.6 GM/DL (ref 2.4–3.5)
GLUCOSE SERPL-MCNC: 100 MG/DL (ref 75–121)
HCT VFR BLD AUTO: 38.4 % (ref 42–52)
HDLC SERPL-MCNC: 26 MG/DL (ref 35–60)
HGB BLD-MCNC: 13 GM/DL (ref 14–18)
IMM GRANULOCYTES # BLD AUTO: 0.12 X10(3)/MCL (ref 0–0.04)
IMM GRANULOCYTES NFR BLD AUTO: 0.8 %
LDLC SERPL CALC-MCNC: 58 MG/DL (ref 50–140)
LYMPHOCYTES # BLD AUTO: 1.56 X10(3)/MCL (ref 0.6–4.6)
LYMPHOCYTES NFR BLD AUTO: 10.8 %
MCH RBC QN AUTO: 31.5 PG
MCHC RBC AUTO-ENTMCNC: 33.9 MG/DL (ref 33–36)
MCV RBC AUTO: 93 FL (ref 80–94)
MONOCYTES # BLD AUTO: 1.57 X10(3)/MCL (ref 0.1–1.3)
MONOCYTES NFR BLD AUTO: 10.9 %
NEUTROPHILS # BLD AUTO: 10.92 X10(3)/MCL (ref 2.1–9.2)
NEUTROPHILS NFR BLD AUTO: 75.7 %
NRBC BLD AUTO-RTO: 0 %
PLATELET # BLD AUTO: 266 X10(3)/MCL (ref 130–400)
PMV BLD AUTO: 11.6 FL (ref 7.4–10.4)
POTASSIUM SERPL-SCNC: 3.5 MMOL/L (ref 3.5–5.1)
PREALB SERPL-MCNC: 12.5 MG/DL (ref 16–42)
PROT SERPL-MCNC: 5.8 GM/DL (ref 5.8–7.6)
PSA SERPL-MCNC: 12.26 NG/ML
RBC # BLD AUTO: 4.13 X10(6)/MCL (ref 4.7–6.1)
SODIUM SERPL-SCNC: 143 MMOL/L (ref 132–146)
TRIGL SERPL-MCNC: 82 MG/DL (ref 34–140)
TSH SERPL-ACNC: 1.94 UIU/ML (ref 0.35–4.94)
VLDLC SERPL CALC-MCNC: 16 MG/DL
WBC # SPEC AUTO: 14.4 X10(3)/MCL (ref 4.5–11.5)

## 2023-01-11 PROCEDURE — 80061 LIPID PANEL: CPT | Performed by: NURSE PRACTITIONER

## 2023-01-11 PROCEDURE — 84443 ASSAY THYROID STIM HORMONE: CPT | Performed by: NURSE PRACTITIONER

## 2023-01-11 PROCEDURE — 84134 ASSAY OF PREALBUMIN: CPT | Performed by: NURSE PRACTITIONER

## 2023-01-11 PROCEDURE — 82306 VITAMIN D 25 HYDROXY: CPT | Performed by: NURSE PRACTITIONER

## 2023-01-11 PROCEDURE — 80053 COMPREHEN METABOLIC PANEL: CPT | Performed by: NURSE PRACTITIONER

## 2023-01-11 PROCEDURE — 85025 COMPLETE CBC W/AUTO DIFF WBC: CPT | Performed by: NURSE PRACTITIONER

## 2023-01-11 PROCEDURE — 84153 ASSAY OF PSA TOTAL: CPT | Performed by: NURSE PRACTITIONER

## 2023-01-18 ENCOUNTER — TELEPHONE (OUTPATIENT)
Dept: INTERNAL MEDICINE | Facility: CLINIC | Age: 88
End: 2023-01-18
Payer: MEDICARE

## 2023-01-18 NOTE — DIETARY NOTE
[facility]    NUTRITION INITIAL ASSESSMENT    Pt does not meet malnutrition criteria. NUTRITION DIAGNOSIS/PROBLEM: No nutrition related diagnoses at this time. NUTRITION INTERVENTION:     1. Meal and Snacks - monitor patient po intake.  Encourage
Yes

## 2023-01-23 ENCOUNTER — OFFICE VISIT (OUTPATIENT)
Dept: INTERNAL MEDICINE | Facility: CLINIC | Age: 88
End: 2023-01-23
Payer: MEDICARE

## 2023-01-23 VITALS
BODY MASS INDEX: 27.83 KG/M2 | SYSTOLIC BLOOD PRESSURE: 112 MMHG | DIASTOLIC BLOOD PRESSURE: 60 MMHG | OXYGEN SATURATION: 96 % | RESPIRATION RATE: 18 BRPM | WEIGHT: 210 LBS | HEART RATE: 62 BPM | HEIGHT: 73 IN

## 2023-01-23 DIAGNOSIS — Z09 HOSPITAL DISCHARGE FOLLOW-UP: Primary | ICD-10-CM

## 2023-01-23 DIAGNOSIS — U07.1 COVID-19: ICD-10-CM

## 2023-01-23 DIAGNOSIS — G30.1 ALZHEIMER'S DISEASE WITH LATE ONSET: ICD-10-CM

## 2023-01-23 DIAGNOSIS — F02.80 ALZHEIMER'S DISEASE WITH LATE ONSET: ICD-10-CM

## 2023-01-23 PROCEDURE — 99214 PR OFFICE/OUTPT VISIT, EST, LEVL IV, 30-39 MIN: ICD-10-PCS | Mod: CR,,, | Performed by: INTERNAL MEDICINE

## 2023-01-23 PROCEDURE — 99214 OFFICE O/P EST MOD 30 MIN: CPT | Mod: CR,,, | Performed by: INTERNAL MEDICINE

## 2023-01-23 RX ORDER — QUETIAPINE FUMARATE 25 MG/1
25 TABLET, FILM COATED ORAL NIGHTLY
Qty: 30 TABLET | Refills: 5 | Status: SHIPPED | OUTPATIENT
Start: 2023-01-23 | End: 2023-04-10

## 2023-01-23 NOTE — PROGRESS NOTES
Patient ID: Wolfgang Duval is a 89 y.o. male.    Chief Complaint: Follow-up and Referral (For Home Health)      HPI:   Patient presents here today for above reason.     Deyvi is a 89-year-old gentleman presents today in follow-up after hospitalization.  He was hospitalized with COVID pneumonia treated with 5 days remdesivir and steroids.  He has done well did not require supplemental oxygen otherwise he is living in independent living currently still requires cuing for ADLs.  He lives with his wife needs home health with physical therapy at skilled nursing occupational therapy.  Dealing with some memory issues was actually very cooperative and pleasant while in the hospital did require some restraints at night.  Currently he is stable again requires cues for ADLs.    The patient's Health Maintenance was reviewed and the following appears to be due at this time:   Health Maintenance Due   Topic Date Due    TETANUS VACCINE  Never done    Shingles Vaccine (2 of 3) 08/24/2012    Pneumococcal Vaccines (Age 65+) (2 - PPSV23 if available, else PCV20) 03/26/2020    COVID-19 Vaccine (2 - Pfizer series) 04/30/2021        Past Medical History:  History reviewed. No pertinent past medical history.  Past Surgical History:   Procedure Laterality Date    APPENDECTOMY      CATARACT EXTRACTION       Review of patient's allergies indicates:   Allergen Reactions    Penicillins      Current Outpatient Medications on File Prior to Visit   Medication Sig Dispense Refill    aspirin 81 MG Chew Take 1 tablet by mouth once daily.      atorvastatin (LIPITOR) 20 MG tablet Take 20 mg by mouth every evening.      carvediloL (COREG) 6.25 MG tablet Take 3.125 mg by mouth 2 (two) times daily.      clopidogreL (PLAVIX) 75 mg tablet Take 75 mg by mouth once daily.      lisinopriL 10 MG tablet Take 10 mg by mouth once daily.      multivitamin capsule Take 1 capsule by mouth once daily.      tamsulosin (FLOMAX) 0.4 mg Cap Take 1 capsule by mouth once  "daily.       No current facility-administered medications on file prior to visit.     Social History     Socioeconomic History    Marital status:    Tobacco Use    Smoking status: Never    Smokeless tobacco: Never   Substance and Sexual Activity    Alcohol use: Never    Drug use: Never     Family History   Problem Relation Age of Onset    Coronary artery disease Father     Coronary artery disease Sister     Coronary artery disease Brother        ROS:   Comprehensive review of systems was performed and is negative except as noted above    Vitals/PE:   /60 (BP Location: Left arm, Patient Position: Sitting)   Pulse 62   Resp 18   Ht 6' 1" (1.854 m)   Wt 95.3 kg (210 lb)   SpO2 96%   BMI 27.71 kg/m²   Physical Exam    General: Alert and oriented, No acute distress.   Eye: Normal conjunctiva without exudate.  HENMT: Normocephalic/AT, Normal hearing, Oral mucosa is moist and pink   Neck: No goiter visualized.   Respiratory: Lungs CTAB, Respirations are non-labored, Breath sounds are equal, Symmetrical chest wall expansion.  Cardiovascular: Normal rate, Regular rhythm, No murmur, No edema.   Gastrointestinal: Non-distended.   Genitourinary: Deferred.  Musculoskeletal: Normal ROM, Normal gait, No deformities or amputations.  Integumentary: Warm, Dry, Intact. No diaphoresis, or flushing.  Neurologic: No focal deficits, Cranial Nerves II-XII are grossly intact.   Psychiatric: Cooperative, Appropriate mood & affect, Normal judgment, Non-suicidal.    Assessment/Plan:       1. Hospital discharge follow-up    2. COVID-19       3. OAB    Plan:  Much improved  Dealing with dementia, worsening  Will start seroquel 25mg at night  HH for SN/PT/OT  Flomax/wearing depends.      Education and counseling done face to face regarding medical conditions and plan. Contact office if new symptoms develop. Should any symptoms ever significantly worsen seek emergency medical attention/go to ER. Follow up at least yearly for " wellness or sooner PRN. Nurse to call patient with any results. The patient is receptive, expresses understanding and is agreeable to plan. All questions have been answered.    No follow-ups on file.

## 2023-01-24 PROCEDURE — G0180 MD CERTIFICATION HHA PATIENT: HCPCS | Mod: CR,,, | Performed by: INTERNAL MEDICINE

## 2023-01-24 PROCEDURE — G0180 PR HOME HEALTH MD CERTIFICATION: ICD-10-PCS | Mod: CR,,, | Performed by: INTERNAL MEDICINE

## 2023-02-05 ENCOUNTER — HOSPITAL ENCOUNTER (EMERGENCY)
Facility: HOSPITAL | Age: 88
Discharge: HOME | End: 2023-02-05
Attending: STUDENT IN AN ORGANIZED HEALTH CARE EDUCATION/TRAINING PROGRAM
Payer: MEDICARE

## 2023-02-05 VITALS
DIASTOLIC BLOOD PRESSURE: 71 MMHG | HEIGHT: 73 IN | SYSTOLIC BLOOD PRESSURE: 122 MMHG | RESPIRATION RATE: 23 BRPM | HEART RATE: 67 BPM | TEMPERATURE: 98 F | WEIGHT: 210 LBS | OXYGEN SATURATION: 95 % | BODY MASS INDEX: 27.83 KG/M2
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
## 2023-02-05 DIAGNOSIS — S00.03XA CONTUSION OF SCALP, INITIAL ENCOUNTER: Primary | ICD-10-CM

## 2023-02-05 PROCEDURE — G0390 TRAUMA RESPONS W/HOSP CRITI: HCPCS | Performed by: STUDENT IN AN ORGANIZED HEALTH CARE EDUCATION/TRAINING PROGRAM

## 2023-02-05 PROCEDURE — 99284 EMERGENCY DEPT VISIT MOD MDM: CPT | Mod: 25

## 2023-02-05 NOTE — ED PROVIDER NOTES
Encounter Date: 2/5/2023    SCRIBE #1 NOTE: I, Joni Johnson, am scribing for, and in the presence of,  Sav Contreras MD. I have scribed the following portions of the note - Other sections scribed: HPI, ROS, PE, MDM.     History     Chief Complaint   Patient presents with    Fall     A 89 y/o male with a history of dementia and HTN presents to [facility] ED via EMS after a witnessed ground level slip and fall after a bath this morning at home. Patient sustained a contusion to the R forehead secondary to the fall. Patient is off balance at baseline and uses a walker. Patient was nonambulatory after the fall this morning. Patient is on Plavix.   Patient denies having any pain and a loss of consciousness.     The history is provided by the patient and the EMS personnel. No  was used.   Fall  The accident occurred today. The fall occurred while walking. Distance fallen: ground level fall. He landed on A hard floor. The point of impact was the head. The pain is at a severity of 0/10. He was Not ambulatory at the scene. Pertinent negatives include no neck pain, no back pain, no fever and no abdominal pain. He has tried nothing for the symptoms. The treatment provided no relief.         Review of patient's allergies indicates:   Allergen Reactions    Penicillins      History reviewed. No pertinent past medical history.  Past Surgical History:   Procedure Laterality Date    APPENDECTOMY      CATARACT EXTRACTION       Family History   Problem Relation Age of Onset    Coronary artery disease Father     Coronary artery disease Sister     Coronary artery disease Brother      Social History     Tobacco Use    Smoking status: Never    Smokeless tobacco: Never   Substance Use Topics    Alcohol use: Never    Drug use: Never     Review of Systems   Constitutional:  Negative for fever.   HENT:  Negative for sore throat.         Contusion to left forehead.    Eyes:  Negative for visual disturbance.   Respiratory:   Negative for shortness of breath.    Cardiovascular:  Negative for chest pain.   Gastrointestinal:  Negative for abdominal pain.   Genitourinary:  Negative for dysuria.   Musculoskeletal:  Negative for back pain, joint swelling and neck pain.   Skin:  Negative for rash.   Neurological:  Negative for weakness.   Psychiatric/Behavioral:  Negative for confusion.    All other systems reviewed and are negative.    Physical Exam     Initial Vitals [02/05/23 0942]   BP Pulse Resp Temp SpO2   98/62 62 16 98.2 °F (36.8 °C) 98 %      MAP       --         Physical Exam    Nursing note and vitals reviewed.  Constitutional: He appears well-developed and well-nourished. He is not diaphoretic. He does not appear ill. No distress.   HENT:   Head: Normocephalic.   Mouth/Throat: Oropharynx is clear and moist.   Patient has a small contusion to the right side of the forehead.  No other abrasions, contusions, lacerations to the scalp or face.  No superior inferior orbital ridge tenderness to palpation.  No zygomatic arch tenderness to palpation.  No epistaxis.  No CSF rhinorrhea.  No septal hematoma.  No intraoral injuries noted.  Normal external ear.  No raccoon eyes.  No [place] sign.     Eyes: Conjunctivae and EOM are normal. Pupils are equal, round, and reactive to light.   Neck:   C collar placed.    Cardiovascular:  Normal rate, regular rhythm, normal heart sounds and intact distal pulses.           Pulmonary/Chest: Breath sounds normal. No respiratory distress. He has no wheezes. He has no rales. He exhibits no tenderness.   Abdominal: Abdomen is soft. Bowel sounds are normal. He exhibits no distension. There is no abdominal tenderness.   Musculoskeletal:         General: No tenderness or edema. Normal range of motion.      Lumbar back: Normal. No tenderness. Normal range of motion.      Comments: No C,T or L-spine vertebral point tenderness to palpation, no step-offs, no deformities.  Right upper extremity:  Full range of  motion of shoulder, elbow, wrist, no deformity or tenderness to palpation.  Left upper extremity: Full range of motion of shoulder, elbow, wrist, no deformity or tenderness to palpation.  Right lower extremity:  Full range of motion of hip, knee, ankle, no tenderness palpation or deformity noted.  Left lower extremity:  Full range of motion of hip, knee, ankle, no tenderness palpation or deformity noted.       Neurological: He is alert and oriented to person, place, and time. He has normal strength. No cranial nerve deficit or sensory deficit.   Skin: Skin is warm and dry. Capillary refill takes less than 2 seconds. No rash noted. No erythema.   Psychiatric: He has a normal mood and affect. His mood appears not anxious.       ED Course   Procedures  Labs Reviewed - No data to display       Imaging Results              CT Cervical Spine Without Contrast (Final result)  Result time 02/05/23 10:10:08      Final result by Dinesh Brown MD (02/05/23 10:10:08)                   Impression:      No acute fracture or malalignment identified.      Electronically signed by: Dinesh Brown  Date:    02/05/2023  Time:    10:10               Narrative:    EXAMINATION:  CT CERVICAL SPINE WITHOUT CONTRAST    CLINICAL HISTORY:  Trauma.    TECHNIQUE:  Multidetector axial images were performed of the cervical spine without and.  Images were reconstructed.    Automated exposure control was utilized to minimize radiation dose.  DLP 1463.    COMPARISON:  None available.    FINDINGS:  Cervical vertebrae stature is maintained and alignment is unremarkable.  No acute fracture or malalignment identified.  There are mild degenerative changes..  There is no prevertebral soft tissue prominence.    This study does not exclude the possibility of intrathecal soft tissue, ligamentous or vascular injury.                                       CT Head Without Contrast (Final result)  Result time 02/05/23 10:10:09      Final result by Scout Reaves,  MD (02/05/23 10:10:09)                   Impression:      No acute intracranial findings or significant interval change compared to December 2022.      Electronically signed by: Scout Reaves  Date:    02/05/2023  Time:    10:10               Narrative:    EXAMINATION:  CT HEAD WITHOUT CONTRAST    CLINICAL HISTORY:  Fall. on thinners;    TECHNIQUE:  CT imaging of the head performed from the skull base to the vertex without intravenous contrast. DLP 1463 mGycm. Automatic exposure control, adjustment of mA/kV or iterative reconstruction technique was used to reduce radiation.    COMPARISON:  29 December 2020    FINDINGS:  There is no new parenchymal attenuation abnormality.  There is moderate generalized atrophy.  There are vascular calcifications.  The ventricles are normal in size.    Visualized paranasal sinuses and mastoid air cells are clear.  Calvarium grossly intact.                                       X-Ray Pelvis Routine AP (Final result)  Result time 02/05/23 10:10:30      Final result by Scout Reaves MD (02/05/23 10:10:30)                   Impression:      No acute findings.      Electronically signed by: Scout Reaves  Date:    02/05/2023  Time:    10:10               Narrative:    EXAMINATION:  XR PELVIS ROUTINE AP    CLINICAL HISTORY:  Unspecified fall, initial encounter    COMPARISON:  None    FINDINGS:  Single frontal image of the pelvis demonstrates no fracture or dislocation.                                       X-Ray Chest AP Portable (Final result)  Result time 02/05/23 10:01:52      Final result by Scout Reaves MD (02/05/23 10:01:52)                   Impression:      No acute findings.      Electronically signed by: Scout Reaves  Date:    02/05/2023  Time:    10:01               Narrative:    EXAMINATION:  XR CHEST AP PORTABLE    CLINICAL HISTORY:  fall;    COMPARISON:  29 December 2022    FINDINGS:  Portable frontal view of the chest was obtained. The heart is not enlarged.  There is  aortic atherosclerosis.  Lungs are grossly clear.  No pneumothorax seen.                                    X-Rays:   Independently Interpreted Readings:   Chest X-Ray: No acute abnormalities.   Other Readings:  Xr Pelvis: No acute fx.   Medications - No data to display  Medical Decision Making:   History:   I obtained history from: EMS provider.       <> Summary of History: Collateral history obtained from EMS who reports that the patient had a ground level slip and fall after a bath this morning and that the patient was nonambulatory upon their arrival.   Old Medical Records: I decided to obtain old medical records.  Old Records Summarized: records from clinic visits and records from previous admission(s).       <> Summary of Records: Reviewed previous records, hx of CAD on aspirin/plavix  Initial Assessment:   Fall, Scalp contusion  Differential Diagnosis:   Judging by the patient's chief complaint and pertinent history, the patient has the following possible differential diagnoses, including but not limited to the following.  Some of these are deemed to be lower likelihood and some more likely based on my physical exam and history combined with possible lab work and/or imaging studies.   Please see the pertinent studies, and refer to the HPI.  Some of these diagnoses will take further evaluation to fully rule out, perhaps as an outpatient and the patient was encouraged to follow up when discharged for more comprehensive evaluation.      abrasion, contusion, fracture, traumatic ICH, TBI, concussion  Clinical Tests:   Radiological Study: Ordered and Reviewed  ED Management:  Patient is a 89-year-old male who presents to the emergency department after mechanical slip and fall after taking a bath.  See HPI.  See physical exam.  Labs and imaging as noted.  CT of the head neck without any acute abnormalities.  Tertiary survey negative for any fractures.  Patient is able to stand and ambulate without difficulty.  All  results discussed.  Answered all questions time.  Discussed need for follow-up.  Discussed walking with a walker.  Discussed return precautions.  Answered all questions time, hemodynamically stable for continued outpatient management strict return precautions. Patient and family verbalized understanding and agreed to plan.    Medical Decision Making  Problems Addressed:  Contusion of scalp, initial encounter: acute illness or injury that poses a threat to life or bodily functions  Fall: acute illness or injury that poses a threat to life or bodily functions    Amount and/or Complexity of Data Reviewed  Radiology: ordered and independent interpretation performed.           Scribe Attestation:   Scribe #1: I performed the above scribed service and the documentation accurately describes the services I performed. I attest to the accuracy of the note.    Attending Attestation:           Physician Attestation for Scribe:  Physician Attestation Statement for Scribe #1: I, Sav Contreras MD, reviewed documentation, as scribed by Joni Johnson in my presence, and it is both accurate and complete.                        Clinical Impression:   Final diagnoses:  [W19.XXXA] Fall  [S00.03XA] Contusion of scalp, initial encounter (Primary)        ED Disposition Condition    Discharge Stable          ED Prescriptions    None       Follow-up Information       Follow up With Specialties Details Why Contact Info    Toan Ford II, MD Internal Medicine   [address] [place] [id number] [phone]      Your primary care physician.                 Sav Contreras MD  02/16/23 0713       Sav Contreras MD  03/02/23 1016

## 2023-02-05 NOTE — DISCHARGE INSTRUCTIONS
Follow-up with your primary care physician.      Return to the emergency department if any new symptoms, chest pain, difficulty breathing, nausea, vomiting, or any other concerns.

## 2023-02-17 ENCOUNTER — EXTERNAL HOME HEALTH (OUTPATIENT)
Dept: HOME HEALTH SERVICES | Facility: HOSPITAL | Age: 88
End: 2023-02-17
Payer: MEDICARE

## 2023-03-02 ENCOUNTER — DOCUMENTATION ONLY (OUTPATIENT)
Dept: INTERNAL MEDICINE | Facility: CLINIC | Age: 88
End: 2023-03-02
Payer: MEDICARE

## 2023-03-07 ENCOUNTER — TELEPHONE (OUTPATIENT)
Dept: INTERNAL MEDICINE | Facility: CLINIC | Age: 88
End: 2023-03-07
Payer: MEDICARE

## 2023-03-07 DIAGNOSIS — R53.1 GENERALIZED WEAKNESS: Primary | ICD-10-CM

## 2023-03-08 NOTE — TELEPHONE ENCOUNTER
Spoke to pt's wife, informed of results. Pt has H/H with [facility] and is requesting for an order for PT be sent there. Please advise

## 2023-03-20 ENCOUNTER — TELEPHONE (OUTPATIENT)
Dept: INTERNAL MEDICINE | Facility: CLINIC | Age: 88
End: 2023-03-20
Payer: MEDICARE

## 2023-03-20 DIAGNOSIS — M25.60 LIMITED JOINT RANGE OF MOTION (ROM): Primary | ICD-10-CM

## 2023-03-25 PROCEDURE — G0179 MD RECERTIFICATION HHA PT: HCPCS | Mod: ,,, | Performed by: INTERNAL MEDICINE

## 2023-03-25 PROCEDURE — G0179 PR HOME HEALTH MD RECERTIFICATION: ICD-10-PCS | Mod: ,,, | Performed by: INTERNAL MEDICINE

## 2023-04-03 ENCOUNTER — TELEPHONE (OUTPATIENT)
Dept: INTERNAL MEDICINE | Facility: CLINIC | Age: 88
End: 2023-04-03
Payer: MEDICARE

## 2023-04-03 NOTE — TELEPHONE ENCOUNTER
Notified Lindesy with [facility] that no labs are needed at this time, voiced understanding

## 2023-04-03 NOTE — TELEPHONE ENCOUNTER
----- Message from Last Chacon LPN sent at 4/3/2023 10:59 AM CDT -----  Regarding: paolo johnston 4/10@1:40  Are there any outstanding tasks in patient chart? No, labs completed January    Is there documentation of outstanding tasks in patient chart? no    Has patient been to the ER, urgent care, or another physician since last visit?    Has patient done any blood work or x-rays since last visit?

## 2023-04-03 NOTE — TELEPHONE ENCOUNTER
----- Message from Geno Cantu sent at 4/3/2023 12:09 PM CDT -----  .Caller is requesting to schedule their Lab appointment prior to annual appointment.  Order is not listed in EPIC.  Please enter order and contact patient to schedule.    Name of Caller:[facility]    Preferred Date and Time of Labs:    Date of EPP Appointment:    Where would they like the lab performed?[facility]    Would the patient rather a call back or a response via [facility]?     Best Call Back Number:    Additional Information:please fax lab orders to [facility] [phone]

## 2023-04-10 ENCOUNTER — OFFICE VISIT (OUTPATIENT)
Dept: INTERNAL MEDICINE | Facility: CLINIC | Age: 88
End: 2023-04-10
Payer: MEDICARE

## 2023-04-10 VITALS
RESPIRATION RATE: 18 BRPM | OXYGEN SATURATION: 97 % | DIASTOLIC BLOOD PRESSURE: 68 MMHG | WEIGHT: 209 LBS | BODY MASS INDEX: 27.7 KG/M2 | SYSTOLIC BLOOD PRESSURE: 96 MMHG | HEART RATE: 76 BPM | HEIGHT: 73 IN

## 2023-04-10 DIAGNOSIS — E78.5 HYPERLIPIDEMIA, UNSPECIFIED HYPERLIPIDEMIA TYPE: ICD-10-CM

## 2023-04-10 DIAGNOSIS — L97.512 NON-PRESSURE CHRONIC ULCER OF OTHER PART OF RIGHT FOOT WITH FAT LAYER EXPOSED: Primary | ICD-10-CM

## 2023-04-10 DIAGNOSIS — F02.80 ALZHEIMER'S DISEASE WITH LATE ONSET: ICD-10-CM

## 2023-04-10 DIAGNOSIS — I10 ESSENTIAL (PRIMARY) HYPERTENSION: ICD-10-CM

## 2023-04-10 DIAGNOSIS — G30.1 ALZHEIMER'S DISEASE WITH LATE ONSET: ICD-10-CM

## 2023-04-10 DIAGNOSIS — I25.10 CORONARY ARTERY DISEASE, UNSPECIFIED VESSEL OR LESION TYPE, UNSPECIFIED WHETHER ANGINA PRESENT, UNSPECIFIED WHETHER NATIVE OR TRANSPLANTED HEART: ICD-10-CM

## 2023-04-10 DIAGNOSIS — I95.1 ORTHOSTASIS: ICD-10-CM

## 2023-04-10 PROBLEM — I72.9 ANEURYSM OF UNSPECIFIED SITE: Status: ACTIVE | Noted: 2023-04-10

## 2023-04-10 PROCEDURE — G0439 PPPS, SUBSEQ VISIT: HCPCS | Mod: ,,, | Performed by: INTERNAL MEDICINE

## 2023-04-10 PROCEDURE — G0439 PR MEDICARE ANNUAL WELLNESS SUBSEQUENT VISIT: ICD-10-PCS | Mod: ,,, | Performed by: INTERNAL MEDICINE

## 2023-04-10 NOTE — PROGRESS NOTES
Patient ID: Wolfgang Duval is a 89 y.o. male.    Chief Complaint: Medicare AWV (Labs in January)      Patient Care Team:  Toan Ford II, MD as PCP - General (Internal Medicine)     HPI:   Patient presents here today for above reason.     Deyvi is a 89-year-old gentleman presenting today in for annual visit.  Doing fairly well was hospitalized earlier in the year with COVID infection.  Doing better in that regard.  He does live in independent living currently but is not getting the medical needs that he needs.  He does ambulate with a walker currently sitting in a wheelchair.  He requires cuing for ADLs his appetite is good weight is stable.  He was seen in the emergency room after a fall that was likely syncopal.  Was taken off of his lisinopril again was seen in the emergency room also seen by his cardiologist.  Otherwise his age-appropriate screening is up-to-date here for annual.      The patient's Health Maintenance was reviewed and the following appears to be due at this time:   Health Maintenance Due   Topic Date Due    TETANUS VACCINE  Never done    Shingles Vaccine (2 of 3) 08/24/2012    Pneumococcal Vaccines (Age 65+) (2 - PPSV23 if available, else PCV20) 03/26/2020    COVID-19 Vaccine (2 - Pfizer series) 04/30/2021        Past Medical History:  History reviewed. No pertinent past medical history.  Past Surgical History:   Procedure Laterality Date    APPENDECTOMY      CATARACT EXTRACTION       Review of patient's allergies indicates:   Allergen Reactions    Penicillins      Current Outpatient Medications on File Prior to Visit   Medication Sig Dispense Refill    aspirin 81 MG Chew Take 1 tablet by mouth once daily.      atorvastatin (LIPITOR) 20 MG tablet Take 20 mg by mouth every evening.      carvediloL (COREG) 6.25 MG tablet Take 3.125 mg by mouth 2 (two) times daily.      clopidogreL (PLAVIX) 75 mg tablet Take 75 mg by mouth once daily.      multivitamin capsule Take 1 capsule by mouth once  daily.      tamsulosin (FLOMAX) 0.4 mg Cap Take 1 capsule by mouth once daily.      [DISCONTINUED] lisinopriL 10 MG tablet Take 10 mg by mouth once daily.      [DISCONTINUED] QUEtiapine (SEROQUEL) 25 MG Tab Take 1 tablet (25 mg total) by mouth nightly. (Patient not taking: Reported on 4/10/2023) 30 tablet 5     No current facility-administered medications on file prior to visit.     Social History     Socioeconomic History    Marital status:    Tobacco Use    Smoking status: Never    Smokeless tobacco: Never   Substance and Sexual Activity    Alcohol use: Never    Drug use: Never     Family History   Problem Relation Age of Onset    Coronary artery disease Father     Coronary artery disease Sister     Coronary artery disease Brother        ROS:   Review of Systems  Constitutional: No weight gain, No fever, No chills, No fatigue.   Eyes: No blurring, No visual disturbances.   Ear/Nose/Mouth/Throat: No decreased hearing, No ear pain, No nasal congestion, No sore throat.   Respiratory: No shortness of breath, No cough, No wheezing.   Cardiovascular: No chest pain, No palpitations, No peripheral edema.   Gastrointestinal: No nausea, No vomiting, No diarrhea, No constipation, No abdominal pain.   Genitourinary: No dysuria, No hematuria.   Hematology/Lymphatics: No bruising tendency, No bleeding tendency, No swollen lymph glands.   Endocrine: No excessive thirst, No polyuria, No excessive hunger.   Musculoskeletal: No joint pain, No muscle pain, No decreased range of motion.   Integumentary: No rash, No pruritus.   Neurologic: No abnormal balance, No confusion, No headache.   Psychiatric: No anxiety, No depression, Not suicidal, No hallucinations.        Patient Reported Health Risk Assessment  What is your age?: 80 or older  Are you male or female?: Male  During the past four weeks, how much have you been bothered by emotional problems such as feeling anxious, depressed, irritable, sad, or downhearted and blue?:  Not at all  During the past five weeks, has your physical and/or emotional health limited your social activities with family, friends, neighbors, or groups?: Not at all  During the past four weeks, how much bodily pain have you generally had?: No pain  During the past four weeks, was someone available to help if you needed and wanted help?: Yes, as much as I wanted  During the past four weeks, what was the hardest physical activity you could do for at least two minutes?: Moderate  Can you get to places out of walking distance without help?  (For example, can you travel alone on buses or taxis, or drive your own car?): Yes  Can you go shopping for groceries or clothes without someone's help?: Yes  Can you prepare your own meals?: Yes  Can you do your own housework without help?: Yes  Because of any health problems, do you need the help of another person with your personal care needs such as eating, bathing, dressing, or getting around the house?: No  Can you handle your own money without help?: Yes  During the past four weeks, how would you rate your health in general?: Excellent  How have things been going for you during the past four weeks?: Very well  Are you having difficulties driving your car?: No  Do you always fasten your seat belt when you are in a car?: Yes, usually  How often in the past four weeks have you been bothered by falling or dizzy when standing up?: Never  How often in the past four weeks have you been bothered by sexual problems?: Never  How often in the past four weeks have you been bothered by trouble eating well?: Never  How often in the past four weeks have you been bothered by teeth or denture problems?: Never  How often in the past four weeks have you been bothered with problems using the telephone?: Never  How often in the past four weeks have you been bothered by tiredness or fatigue?: Never  Have you fallen two or more times in the past year?: No  Are you afraid of falling?: No  Are you  "a smoker?: No  During the past four weeks, how many drinks of wine, beer, or other alcoholic beverages did you have?: No alcohol at all  Do you exercise for about 20 minutes three or more days a week?: Yes, some of the time  Have you been given any information to help you with hazards in your house that might hurt you?: Yes  Have you been given any information to help you with keeping track of your medications?: Yes  How often do you have trouble taking medicines the way you've been told to take them?: I always take them as prescribed  How confident are you that you can control and manage most of your health problems?: Very confident    Vitals/PE:   BP 96/68 (BP Location: Left arm, Patient Position: Sitting)   Pulse 76   Resp 18   Ht 6' 1" (1.854 m)   Wt 94.8 kg (209 lb)   SpO2 97%   BMI 27.57 kg/m²   Physical Exam    General: Alert and oriented, No acute distress.   Eye: Normal conjunctiva without exudate.  HENMT: Normocephalic/AT, Normal hearing, Oral mucosa is moist and pink   Neck: No goiter visualized.   Respiratory: Lungs CTAB, Respirations are non-labored, Breath sounds are equal, Symmetrical chest wall expansion.  Cardiovascular: Normal rate, Regular rhythm, No murmur, No edema.   Gastrointestinal: Non-distended.   Genitourinary: Deferred.  Musculoskeletal: Normal ROM, Normal gait, No deformities or amputations.  Integumentary: Warm, Dry, Intact. No diaphoresis, or flushing.  Neurologic: No focal deficits, Cranial Nerves II-XII are grossly intact.   Psychiatric: Cooperative, Appropriate mood & affect, Normal judgment, Non-suicidal.        No flowsheet data found.  Fall Risk Assessment - Outpatient 4/10/2023 1/23/2023   Mobility Status Ambulatory w/ assistance Ambulatory w/ assistance   Number of falls 0 1   Identified as fall risk 1 1           Depression Screening  Over the past two weeks, has the patient felt down, depressed, or hopeless?: No  Over the past two weeks, has the patient felt little " "interest or pleasure in doing things?: No  Functional Ability/Safety Screening  Was the patient's timed Up & Go test unsteady or longer than 30 seconds?: No  Does the patient need help with phone, transportation, shopping, preparing meals, housework, laundry, meds, or managing money?: No  Does the patient's home have rugs in the hallway, lack grab bars in the bathroom, lack handrails on the stairs or have poor lighting?: No  Have you noticed any hearing difficulties?: No  A "Yes" response to any of the above questions should trigger further investigation.: 0  Cognitive Function (Assessed through direct observation with due consideration of information obtained by way of patient reports and/or concerns raised by family, friends, caretakers, or others)    Does the patient repeat questions/statements in the same day?: No  Does the patient have trouble remembering the date, year, and time?: No  Does the patient have difficulty managing finances?: No  Does the patient have a decreased sense of direction?: No  A "Yes" response to any of the above questions could indicate mild cognitive impairment and should trigger further investigation.: 0    Assessment/Plan:       1. Aneurysm of unspecified site   Not an issue  2. Non-pressure chronic ulcer of other part of right foot with fat layer exposed    resolved  3. Coronary artery disease, unspecified vessel or lesion type, unspecified whether angina present, unspecified whether native or transplanted heart   Stable, sees cardio.  Stable, no cp/sob  4. Hyperlipidemia, unspecified hyperlipidemia type   At goal on statin  5. Essential (primary) hypertension   Lisinopril was stopped, bp still a llittle low.may have to d/c flomax  6. Alzheimer's disease with late onset   Stable, has 24 hr sitters  7. Orthostasis   Lisinopril was d/c, may have to d/c flomax if continues and possibly coreg.     Cpmm  Rtc 6 mo            Education and counseling done face to face regarding medical " conditions and plan. Contact office if new symptoms develop. Should any symptoms ever significantly worsen seek emergency medical attention/go to ER. Follow up at least yearly for wellness or sooner PRN. Nurse to call patient with any results. The patient is receptive, expresses understanding and is agreeable to plan. All questions have been answered.    No follow-ups on file.      Medicare Annual Wellness and Personalized Prevention Plan:   Fall Risk + Home Safety + Hearing Impairment + Depression Screen + Cognitive Impairment Screen + Health Risk Assessment all reviewed.    Advance Care Planning   I attest to discussing Advance Care Planning with patient and/or family member.  Education was provided including the importance of the Health Care Power of , Advance Directives, and/or [place] documentation.  The patient expressed understanding to the importance of this information and discussion.  Length of ACP conversation in minutes:          Opioid Screening: Patient medication list reviewed, patient is not taking prescription opioids. Patient is not using additional opioids than prescribed. Patient is at low risk of substance abuse based on this opioid use history.

## 2023-04-20 ENCOUNTER — TELEPHONE (OUTPATIENT)
Dept: INTERNAL MEDICINE | Facility: CLINIC | Age: 88
End: 2023-04-20
Payer: MEDICARE

## 2023-04-27 ENCOUNTER — EXTERNAL HOME HEALTH (OUTPATIENT)
Dept: HOME HEALTH SERVICES | Facility: HOSPITAL | Age: 88
End: 2023-04-27
Payer: MEDICARE

## 2023-07-10 NOTE — H&P
This is a pre-cath H&P    [facility]  Report of Consultation    Chance Nur Patient Status:  Outpatient in a Bed    10/3/1932 MRN [id number]   Location [address] [facility] Attending Aure Scruggs MD   Hosp Day # 0 PCP None Pcp be peroneal nerve impingement in the left leg. The patient has had recurrent chest pain for months-years, usually sharp and resolving within 10 seconds.   Cath 2013 showed normal coronaries, nuclear stress test 2015 showed no ischemia EF 52% with some hy Cigarettes. He has a 40.00 pack-year smoking history. He has never used smokeless tobacco. He reports that he drinks alcohol. He reports that he does not use drugs.     Allergies:    Dye [Radiology Cont*        Comment:Throat swells    Medications:  No curr Known 21:30 21:00

## 2023-08-09 ENCOUNTER — TELEPHONE (OUTPATIENT)
Dept: INTERNAL MEDICINE | Facility: CLINIC | Age: 88
End: 2023-08-09
Payer: MEDICARE

## 2023-08-09 DIAGNOSIS — G30.1 ALZHEIMER'S DISEASE WITH LATE ONSET: Primary | ICD-10-CM

## 2023-08-09 DIAGNOSIS — F02.80 ALZHEIMER'S DISEASE WITH LATE ONSET: Primary | ICD-10-CM

## 2023-08-09 NOTE — TELEPHONE ENCOUNTER
----- Message from [facility] sent at 8/9/2023 10:28 AM CDT -----  Regarding: requesting home health  .Type:  Needs Medical Advice    Who Called:  pt's wife ( Lisha)      Would the patient rather a call back? Yes    Best Call Back Number:  [phone]    Additional Information:  she is requesting [facility] for home care

## 2023-08-09 NOTE — TELEPHONE ENCOUNTER
----- Message from Talya Castillo sent at 8/9/2023 10:48 AM CDT -----  Regarding: referral  Type:  Patient Requesting Referral    Who Called:pt's wife ness    Referral to What Specialty:nursing & HH  Reason for Referral:rash for nurse  Does the patient want the referral with a specific physician?:[facility]  Is the specialist an [facility]?:  Patient Requesting a Response?:yes  Would the patient rather a call back or a response via [facility]? C/b  Best Call Back Number:[phone]  Additional Information: pt has developed a rash in the groin and needs nurse to attend it is to difficult for pt's wife to handle.  Also would like to have home health renewed. Please send referral as soon as possible due to rash.    Please contact pt's wife to confirm.    Thank you

## 2023-08-14 ENCOUNTER — TELEPHONE (OUTPATIENT)
Dept: INTERNAL MEDICINE | Facility: CLINIC | Age: 88
End: 2023-08-14
Payer: MEDICARE

## 2023-08-14 NOTE — TELEPHONE ENCOUNTER
----- Message from Rox Rivera sent at 8/14/2023  3:10 PM CDT -----  Spoke with patient spouse.  Appt schedule with Ada tomorrow 8/15 @ 120 for rash in [facility]

## 2023-08-15 ENCOUNTER — OFFICE VISIT (OUTPATIENT)
Dept: INTERNAL MEDICINE | Facility: CLINIC | Age: 88
End: 2023-08-15
Payer: MEDICARE

## 2023-08-15 ENCOUNTER — TELEPHONE (OUTPATIENT)
Dept: INTERNAL MEDICINE | Facility: CLINIC | Age: 88
End: 2023-08-15

## 2023-08-15 VITALS
RESPIRATION RATE: 14 BRPM | OXYGEN SATURATION: 95 % | BODY MASS INDEX: 29.29 KG/M2 | SYSTOLIC BLOOD PRESSURE: 112 MMHG | HEIGHT: 73 IN | WEIGHT: 221 LBS | HEART RATE: 73 BPM | DIASTOLIC BLOOD PRESSURE: 76 MMHG

## 2023-08-15 DIAGNOSIS — I72.9 ANEURYSM OF UNSPECIFIED SITE: ICD-10-CM

## 2023-08-15 DIAGNOSIS — M24.541 CONTRACTURE OF JOINT OF FINGER OF RIGHT HAND: ICD-10-CM

## 2023-08-15 DIAGNOSIS — B37.2 CANDIDAL DIAPER DERMATITIS: ICD-10-CM

## 2023-08-15 DIAGNOSIS — L22 CANDIDAL DIAPER DERMATITIS: ICD-10-CM

## 2023-08-15 DIAGNOSIS — B37.2 CANDIDAL DIAPER DERMATITIS: Primary | ICD-10-CM

## 2023-08-15 DIAGNOSIS — L22 CANDIDAL DIAPER DERMATITIS: Primary | ICD-10-CM

## 2023-08-15 PROCEDURE — 99214 PR OFFICE/OUTPT VISIT, EST, LEVL IV, 30-39 MIN: ICD-10-PCS | Mod: ,,, | Performed by: NURSE PRACTITIONER

## 2023-08-15 PROCEDURE — 99214 OFFICE O/P EST MOD 30 MIN: CPT | Mod: ,,, | Performed by: NURSE PRACTITIONER

## 2023-08-15 RX ORDER — CLOTRIMAZOLE 1 %
CREAM (GRAM) TOPICAL 2 TIMES DAILY
Qty: 60 G | Refills: 2 | Status: SHIPPED | OUTPATIENT
Start: 2023-08-15 | End: 2023-08-15 | Stop reason: SDUPTHER

## 2023-08-15 RX ORDER — CLOTRIMAZOLE 1 %
CREAM (GRAM) TOPICAL 2 TIMES DAILY
Qty: 60 G | Refills: 2 | Status: SHIPPED | OUTPATIENT
Start: 2023-08-15

## 2023-08-15 NOTE — TELEPHONE ENCOUNTER
----- Message from Amada Resendiz sent at 8/15/2023 10:02 AM CDT -----  Regarding: call back  .Type:  Needs Medical Advice    Who Called: Belle dutton/[facility]   Symptoms (please be specific): rash on groin    How long has patient had these symptoms:    Pharmacy name and phone #:    Would the patient rather a call back or a response via [facility]? Call back  Best Call Back Number: [phone]  Additional Information: requesting a call back needs the notes on pt symptoms fax [phone]

## 2023-08-15 NOTE — TELEPHONE ENCOUNTER
----- Message from Sonny Rachel sent at 8/15/2023  4:15 PM CDT -----  .Type:  Needs Medical Advice    Who Called: [facility]   Would the patient rather a call back or a response via [facility]? Call back   Best Call Back Number: [phone]  Additional Information: Staff states that they need the notes from the appt 8/15/23 for home health. There is an order     [phone] please fax

## 2023-08-15 NOTE — TELEPHONE ENCOUNTER
Spoke with Belle and she is requesting chart notes from today with NP faxed over so they can admit pt to H/H services

## 2023-08-15 NOTE — TELEPHONE ENCOUNTER
Spoke to patient's wife, advised that it is not covered under insurance.  Asked if she wanted the Rx to be sent to a different pharmacy so they can use [facility] $10   Other Pharmacies $11  -->[facility] $13

## 2023-08-15 NOTE — PROGRESS NOTES
"Subjective:      Patient ID: Wolfgang Duval is a 89 y.o. male.    Chief Complaint: Rash (Pt has a rash on groin for over a week, no changes wife thinks it is heat rash. Pt does wear a diaper with a guard inside but this is the first summer in a diaper. Pt middle finger is in a bent position and he cannot straighten. )      HPI: Patient here today for c/o rash to groin x 1 week. He does wear Depends with urinary/fecal incontinence. Wife presents with patient today noting use of Viri's and corn starch.    Additionally, issues with R hand middle digit contracture.     Review of patient's allergies indicates:   Allergen Reactions    Penicillins        Review of Systems  Constitutional: No fever, No chills, No sweats, No fatigue  Musculoskeletal: R hand 3rd digit stiffness, 4th digit pain  Integumentary: R inguinal rash, No ecchymosis.    Objective:   Visit Vitals  /76 (BP Location: Left arm, Patient Position: Sitting, BP Method: Medium (Manual))   Pulse 73   Resp 14   Ht 6' 1" (1.854 m)   Wt 100.2 kg (221 lb)   SpO2 95%   BMI 29.16 kg/m²     The patient's weight trend is below:   Wt Readings from Last 4 Encounters:   08/15/23 100.2 kg (221 lb)   04/10/23 94.8 kg (209 lb)   02/05/23 95.3 kg (210 lb)   01/23/23 95.3 kg (210 lb)        Physical Exam  Vitals and nursing note reviewed.   Constitutional:       General: He is not in acute distress.     Appearance: Normal appearance. He is normal weight. He is not ill-appearing, toxic-appearing or diaphoretic.   HENT:      Head: Normocephalic and atraumatic.   Pulmonary:      Effort: Pulmonary effort is normal.   Skin:            Comments: Localized circular area of erythema noted to R inner thigh and R testicle   Neurological:      General: No focal deficit present.      Mental Status: He is alert and oriented to person, place, and time. Mental status is at baseline.         Assessment/Plan:   1. Candidal diaper dermatitis  RX Lotrimin to be applied as " directed  Monitor for secondary infection with f/u in the next few days  Keep area clean/dry  F/u as needed  - clotrimazole (LOTRIMIN) 1 % cream; Apply topically 2 (two) times daily.  Dispense: 60 g; Refill: 2    2. 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 Aneurysm of unspecified site  Followed by [facility], Dr. Vogt with upcoming f/u this month.  Denies pain       Medication List with Changes/Refills   New Medications    CLOTRIMAZOLE (LOTRIMIN) 1 % CREAM    Apply topically 2 (two) times daily.   Current Medications    ASPIRIN 81 MG CHEW    Take 1 tablet by mouth once daily.    ATORVASTATIN (LIPITOR) 20 MG TABLET    Take 20 mg by mouth every evening.    CARVEDILOL (COREG) 6.25 MG TABLET    Take 3.125 mg by mouth 2 (two) times daily.    CLOPIDOGREL (PLAVIX) 75 MG TABLET    Take 75 mg by mouth once daily.    MULTIVITAMIN CAPSULE    Take 1 capsule by mouth once daily.    TAMSULOSIN (FLOMAX) 0.4 MG CAP    Take 1 capsule by mouth once daily.        No follow-ups on file.    Chemistry:  Lab Results   Component Value Date     01/11/2023    K 3.5 01/11/2023    CHLORIDE 107 01/11/2023    BUN 44.0 (H) 01/11/2023    CREATININE 1.17 01/11/2023    EGFRNORACEVR 59 01/11/2023    GLUCOSE 100 01/11/2023    CALCIUM 9.0 01/11/2023    ALKPHOS 77 01/11/2023    LABPROT 5.8 01/11/2023    ALBUMIN 3.2 (L) 01/11/2023    BILIDIR 0.5 03/31/2022    IBILI 0.50 03/31/2022    AST 39 (H) 01/11/2023    ALT 44 01/11/2023    MG 1.90 12/29/2022    HQFJSGBN91SP 35.8 01/11/2023        Lab Results   Component Value Date    HGBA1C 5.6 03/31/2022        Hematology:  Lab Results   Component Value Date    WBC 14.4 (H) 01/11/2023    HGB 13.0 (L) 01/11/2023    HCT 38.4 (L) 01/11/2023     01/11/2023       Lipid Panel:  Lab Results   Component Value Date    CHOL 100 01/11/2023    HDL 26 (L) 01/11/2023    LDL 58.00 01/11/2023    TRIG 82 01/11/2023    TOTALCHOLEST 4  01/11/2023        Urine:  Lab Results   Component Value Date    COLORUA Red (A) 01/04/2023    APPEARANCEUA Turbid (A) 01/04/2023    SGUA >=1.030 01/04/2023    PHUA  01/04/2023      Comment:      N/A    UNABLE TO REPORT BIOCHEMICAL TESTS DUE TO URINE COLOR    PROTEINUA  01/04/2023      Comment:      N/A    GLUCOSEUA  01/04/2023      Comment:      N/A    KETONESUA  01/04/2023      Comment:      N/A    BLOODUA  01/04/2023      Comment:      N/A    NITRITESUA  01/04/2023      Comment:      N/A    LEUKOCYTESUR  01/04/2023      Comment:      N/A    RBCUA >3,000 (H) 01/04/2023    WBCUA 38 (H) 01/04/2023    BACTERIA None Seen 01/04/2023

## 2023-08-15 NOTE — TELEPHONE ENCOUNTER
----- Message from Sonny Rachel sent at 8/15/2023  3:47 PM CDT -----  .Type:  Patient Returning Call    Who Called:pt wife  Who Left Message for Patient:  Does the patient know what this is regarding?:  Would the patient rather a call back or a response via [facility]? Call back   Best Call Back Number:[phone]  Additional Information: pt wife states that she has a question  about the pt medication   call back line no answer

## 2023-08-16 ENCOUNTER — TELEPHONE (OUTPATIENT)
Dept: INTERNAL MEDICINE | Facility: CLINIC | Age: 88
End: 2023-08-16
Payer: MEDICARE

## 2023-08-16 PROCEDURE — G0180 MD CERTIFICATION HHA PATIENT: HCPCS | Mod: ,,, | Performed by: INTERNAL MEDICINE

## 2023-08-16 PROCEDURE — G0180 PR HOME HEALTH MD CERTIFICATION: ICD-10-PCS | Mod: ,,, | Performed by: INTERNAL MEDICINE

## 2023-08-16 NOTE — TELEPHONE ENCOUNTER
----- Message from Olga Hall sent at 8/16/2023  9:11 AM CDT -----  Regarding: Last visit notes  .Type:  Patient Returning Call    Who Called:Belle Chávez  Who Left Message for Patient:Belle  Does the patient know what this is regarding?:Office Notes  Would the patient rather a call back or a response via [facility]?   Best Call Back Number:[phone] Gibran  Additional Information: Belle is requesting last visit notes to be faxed over . The fax # is [phone]

## 2023-08-29 ENCOUNTER — EXTERNAL HOME HEALTH (OUTPATIENT)
Dept: HOME HEALTH SERVICES | Facility: HOSPITAL | Age: 88
End: 2023-08-29
Payer: MEDICARE

## 2023-08-29 ENCOUNTER — TELEPHONE (OUTPATIENT)
Dept: INTERNAL MEDICINE | Facility: CLINIC | Age: 88
End: 2023-08-29
Payer: MEDICARE

## 2023-08-29 DIAGNOSIS — R21 RASH: Primary | ICD-10-CM

## 2023-08-29 RX ORDER — NYSTATIN 100000 U/G
CREAM TOPICAL 2 TIMES DAILY
Qty: 30 G | Refills: 1 | Status: SHIPPED | OUTPATIENT
Start: 2023-08-29

## 2023-08-29 NOTE — TELEPHONE ENCOUNTER
Na notified, voiced understanding.    Unable to locate lab results with [facility] or [employer].     Patient is aware he will need to be redrawn. Appointment scheduled. Labs reordered.

## 2023-08-29 NOTE — TELEPHONE ENCOUNTER
Na with [facility] called stating pt has a yeast rash in the groin and now it is behind the ear too. Has been using Lotrimine but it's not helping. Please advise

## 2023-08-29 NOTE — TELEPHONE ENCOUNTER
----- Message from Jr Russell sent at 8/29/2023 12:53 PM CDT -----  .Type:  Needs Medical Advice    Who Called: [facility] Na  Symptoms (please be specific): yeast infection   How long has patient had these symptoms:    Pharmacy name and phone #:    Would the patient rather a call back or a response via [facility]? Call back  Best Call Back Number: [phone]  Additional Information:

## 2023-10-15 PROCEDURE — G0179 MD RECERTIFICATION HHA PT: HCPCS | Mod: ,,, | Performed by: INTERNAL MEDICINE

## 2023-10-15 PROCEDURE — G0179 PR HOME HEALTH MD RECERTIFICATION: ICD-10-PCS | Mod: ,,, | Performed by: INTERNAL MEDICINE

## 2023-10-30 ENCOUNTER — EXTERNAL HOME HEALTH (OUTPATIENT)
Dept: HOME HEALTH SERVICES | Facility: HOSPITAL | Age: 88
End: 2023-10-30
Payer: MEDICARE

## 2023-11-26 ENCOUNTER — LAB REQUISITION (OUTPATIENT)
Dept: LAB | Facility: HOSPITAL | Age: 88
End: 2023-11-26
Payer: MEDICARE

## 2023-11-26 DIAGNOSIS — R05.9 COUGH, UNSPECIFIED: ICD-10-CM

## 2023-11-26 LAB — SARS-COV-2 RNA RESP QL NAA+PROBE: NOT DETECTED

## 2023-11-26 PROCEDURE — 87635 SARS-COV-2 COVID-19 AMP PRB: CPT | Performed by: OTOLARYNGOLOGY

## 2023-11-29 ENCOUNTER — OFFICE VISIT (OUTPATIENT)
Dept: INTERNAL MEDICINE | Facility: CLINIC | Age: 88
End: 2023-11-29
Payer: MEDICARE

## 2023-11-29 VITALS
WEIGHT: 221 LBS | HEIGHT: 73 IN | DIASTOLIC BLOOD PRESSURE: 80 MMHG | BODY MASS INDEX: 29.29 KG/M2 | RESPIRATION RATE: 18 BRPM | OXYGEN SATURATION: 97 % | SYSTOLIC BLOOD PRESSURE: 118 MMHG | HEART RATE: 75 BPM

## 2023-11-29 DIAGNOSIS — J06.9 UPPER RESPIRATORY TRACT INFECTION, UNSPECIFIED TYPE: Primary | ICD-10-CM

## 2023-11-29 PROCEDURE — 99213 OFFICE O/P EST LOW 20 MIN: CPT | Mod: ,,, | Performed by: INTERNAL MEDICINE

## 2023-11-29 PROCEDURE — 99213 PR OFFICE/OUTPT VISIT, EST, LEVL III, 20-29 MIN: ICD-10-PCS | Mod: ,,, | Performed by: INTERNAL MEDICINE

## 2023-11-29 RX ORDER — BENZONATATE 200 MG/1
200 CAPSULE ORAL 3 TIMES DAILY PRN
Qty: 30 CAPSULE | Refills: 0 | Status: SHIPPED | OUTPATIENT
Start: 2023-11-29 | End: 2023-12-09

## 2023-11-29 NOTE — PROGRESS NOTES
Patient ID: Wolfgang Duval is a 89 y.o. male.    Chief Complaint: Cough (Productive, clear x 5 days)      HPI:   Patient presents here today for above reason.     Wolfgang is a 89-year-old gentleman presents today for evaluation of a week long duration of cough nonproductive.  Wife has similar symptoms COVID swab on Sunday was negative.  Has not had a flu vaccine or pneumonia vaccine.  On exam is lungs are clear to auscultation cough is worse at night when lying down has a sleep with 3 pillows or even in her recliner.  No fever    The patient's Health Maintenance was reviewed and the following appears to be due at this time:   Health Maintenance Due   Topic Date Due    TETANUS VACCINE  Never done    RSV Vaccine (Age 60+ and Pregnant patients) (1 - 1-dose 60+ series) Never done    Shingles Vaccine (2 of 3) 08/24/2012    Pneumococcal Vaccines (Age 65+) (2 - PPSV23 or PCV20) 03/26/2020    Influenza Vaccine (1) 09/01/2023    COVID-19 Vaccine (2 - 2023-24 season) 09/01/2023        Past Medical History:  History reviewed. No pertinent past medical history.  Past Surgical History:   Procedure Laterality Date    APPENDECTOMY      CATARACT EXTRACTION       Review of patient's allergies indicates:   Allergen Reactions    Penicillins      Current Outpatient Medications on File Prior to Visit   Medication Sig Dispense Refill    aspirin 81 MG Chew Take 1 tablet by mouth once daily.      atorvastatin (LIPITOR) 20 MG tablet Take 20 mg by mouth every evening.      carvediloL (COREG) 6.25 MG tablet Take 3.125 mg by mouth 2 (two) times daily.      tamsulosin (FLOMAX) 0.4 mg Cap Take 1 capsule by mouth once daily.      clopidogreL (PLAVIX) 75 mg tablet Take 75 mg by mouth once daily.      clotrimazole (LOTRIMIN) 1 % cream Apply topically 2 (two) times daily. Apply Good Rx Coupon 60 g 2    multivitamin capsule Take 1 capsule by mouth once daily.      nystatin (MYCOSTATIN) cream Apply topically 2 (two) times daily. 30 g 1     No current  "facility-administered medications on file prior to visit.     Social History     Socioeconomic History    Marital status:    Tobacco Use    Smoking status: Never    Smokeless tobacco: Never   Substance and Sexual Activity    Alcohol use: Never    Drug use: Never    Sexual activity: Not Currently     Family History   Problem Relation Age of Onset    Coronary artery disease Father     Coronary artery disease Sister     Coronary artery disease Brother        ROS:   Comprehensive review of systems was performed and is negative except as noted above    Vitals/PE:   /80 (BP Location: Left arm, Patient Position: Sitting)   Pulse 75   Resp 18   Ht 6' 1" (1.854 m)   Wt 100.2 kg (221 lb)   SpO2 97%   BMI 29.16 kg/m²   Physical Exam    General: Alert and oriented, No acute distress.   Eye: Normal conjunctiva without exudate.  HENMT: Normocephalic/AT, Normal hearing, Oral mucosa is moist and pink   Neck: No goiter visualized.   Respiratory: Lungs CTAB, Respirations are non-labored, Breath sounds are equal, Symmetrical chest wall expansion.  Cardiovascular: Normal rate, Regular rhythm, No murmur, No edema.   Gastrointestinal: Non-distended.   Genitourinary: Deferred.  Musculoskeletal: Normal ROM, Normal gait, No deformities or amputations.  Integumentary: Warm, Dry, Intact. No diaphoresis, or flushing.  Neurologic: No focal deficits, Cranial Nerves II-XII are grossly intact.   Psychiatric: Cooperative, Appropriate mood & affect, Normal judgment, Non-suicidal.    Assessment/Plan:       1. Upper respiratory tract infection, unspecified type         Plan:  One Tessalon 200 mg t.i.d. p.r.n. cough  This is likely viral will continue to monitor he does appear improved per the wife.  Robitussin or Mucinex DM as needed.  Continue to monitor.    Education and counseling done face to face regarding medical conditions and plan. Contact office if new symptoms develop. Should any symptoms ever significantly worsen seek " emergency medical attention/go to ER. Follow up at least yearly for wellness or sooner PRN. Nurse to call patient with any results. The patient is receptive, expresses understanding and is agreeable to plan. All questions have been answered.    No follow-ups on file.

## 2024-04-09 ENCOUNTER — TELEPHONE (OUTPATIENT)
Dept: INTERNAL MEDICINE | Facility: CLINIC | Age: 89
End: 2024-04-09
Payer: MEDICARE

## 2024-04-09 DIAGNOSIS — I10 ESSENTIAL (PRIMARY) HYPERTENSION: ICD-10-CM

## 2024-04-09 DIAGNOSIS — Z12.5 SCREENING PSA (PROSTATE SPECIFIC ANTIGEN): ICD-10-CM

## 2024-04-09 DIAGNOSIS — I25.10 CORONARY ARTERY DISEASE, UNSPECIFIED VESSEL OR LESION TYPE, UNSPECIFIED WHETHER ANGINA PRESENT, UNSPECIFIED WHETHER NATIVE OR TRANSPLANTED HEART: ICD-10-CM

## 2024-04-09 DIAGNOSIS — E78.5 HYPERLIPIDEMIA, UNSPECIFIED HYPERLIPIDEMIA TYPE: ICD-10-CM

## 2024-04-09 DIAGNOSIS — Z00.00 WELLNESS EXAMINATION: Primary | ICD-10-CM

## 2024-04-09 DIAGNOSIS — I72.9 ANEURYSM OF UNSPECIFIED SITE: ICD-10-CM

## 2024-04-09 DIAGNOSIS — R53.83 FATIGUE, UNSPECIFIED TYPE: ICD-10-CM

## 2024-04-09 NOTE — TELEPHONE ENCOUNTER
----- Message from Last Chacon LPN sent at 4/9/2024  7:57 AM CDT -----  Regarding: paolo johnston 4/17 @2:20  Are there any outstanding tasks in patient chart? Needs fasting labs    Is there documentation of outstanding tasks in patient chart? no    Has patient been to the ER, urgent care, or another physician since last visit?    Has patient done any blood work or x-rays since last visit?    5. PLEASE HAVE PATIENT BRING MEDICATION LIST OR BOTTLES TO EVERY OFFICE VISIT

## 2024-04-12 ENCOUNTER — LAB VISIT (OUTPATIENT)
Dept: LAB | Facility: HOSPITAL | Age: 89
End: 2024-04-12
Attending: INTERNAL MEDICINE
Payer: MEDICARE

## 2024-04-12 DIAGNOSIS — I25.10 CORONARY ARTERY DISEASE, UNSPECIFIED VESSEL OR LESION TYPE, UNSPECIFIED WHETHER ANGINA PRESENT, UNSPECIFIED WHETHER NATIVE OR TRANSPLANTED HEART: ICD-10-CM

## 2024-04-12 DIAGNOSIS — I10 ESSENTIAL (PRIMARY) HYPERTENSION: ICD-10-CM

## 2024-04-12 DIAGNOSIS — E78.5 HYPERLIPIDEMIA, UNSPECIFIED HYPERLIPIDEMIA TYPE: ICD-10-CM

## 2024-04-12 DIAGNOSIS — Z12.5 SCREENING PSA (PROSTATE SPECIFIC ANTIGEN): ICD-10-CM

## 2024-04-12 DIAGNOSIS — I72.9 ANEURYSM OF UNSPECIFIED SITE: ICD-10-CM

## 2024-04-12 DIAGNOSIS — Z00.00 WELLNESS EXAMINATION: ICD-10-CM

## 2024-04-12 DIAGNOSIS — R53.83 FATIGUE, UNSPECIFIED TYPE: ICD-10-CM

## 2024-04-12 LAB
ALBUMIN SERPL-MCNC: 3.4 G/DL (ref 3.4–4.8)
ALBUMIN/GLOB SERPL: 1 RATIO (ref 1.1–2)
ALP SERPL-CCNC: 93 UNIT/L (ref 40–150)
ALT SERPL-CCNC: 15 UNIT/L (ref 0–55)
AST SERPL-CCNC: 15 UNIT/L (ref 5–34)
BASOPHILS # BLD AUTO: 0.06 X10(3)/MCL
BASOPHILS NFR BLD AUTO: 0.7 %
BILIRUB SERPL-MCNC: 0.7 MG/DL
BUN SERPL-MCNC: 20.1 MG/DL (ref 8.4–25.7)
CALCIUM SERPL-MCNC: 8.9 MG/DL (ref 8.8–10)
CHLORIDE SERPL-SCNC: 109 MMOL/L (ref 98–111)
CHOLEST SERPL-MCNC: 117 MG/DL
CHOLEST/HDLC SERPL: 3 {RATIO} (ref 0–5)
CO2 SERPL-SCNC: 23 MMOL/L (ref 23–31)
CREAT SERPL-MCNC: 1.05 MG/DL (ref 0.73–1.18)
EOSINOPHIL # BLD AUTO: 0.3 X10(3)/MCL (ref 0–0.9)
EOSINOPHIL NFR BLD AUTO: 3.3 %
ERYTHROCYTE [DISTWIDTH] IN BLOOD BY AUTOMATED COUNT: 12.6 % (ref 11.5–17)
GFR SERPLBLD CREATININE-BSD FMLA CKD-EPI: >60 MLS/MIN/1.73/M2
GLOBULIN SER-MCNC: 3.5 GM/DL (ref 2.4–3.5)
GLUCOSE SERPL-MCNC: 120 MG/DL (ref 75–121)
HCT VFR BLD AUTO: 44 % (ref 42–52)
HDLC SERPL-MCNC: 39 MG/DL (ref 35–60)
HGB BLD-MCNC: 14.7 G/DL (ref 14–18)
IMM GRANULOCYTES # BLD AUTO: 0.03 X10(3)/MCL (ref 0–0.04)
IMM GRANULOCYTES NFR BLD AUTO: 0.3 %
LDLC SERPL CALC-MCNC: 62 MG/DL (ref 50–140)
LYMPHOCYTES # BLD AUTO: 1.61 X10(3)/MCL (ref 0.6–4.6)
LYMPHOCYTES NFR BLD AUTO: 17.5 %
MCH RBC QN AUTO: 30.9 PG (ref 27–31)
MCHC RBC AUTO-ENTMCNC: 33.4 G/DL (ref 33–36)
MCV RBC AUTO: 92.4 FL (ref 80–94)
MONOCYTES # BLD AUTO: 1.15 X10(3)/MCL (ref 0.1–1.3)
MONOCYTES NFR BLD AUTO: 12.5 %
NEUTROPHILS # BLD AUTO: 6.04 X10(3)/MCL (ref 2.1–9.2)
NEUTROPHILS NFR BLD AUTO: 65.7 %
NRBC BLD AUTO-RTO: 0 %
PLATELET # BLD AUTO: 212 X10(3)/MCL (ref 130–400)
PMV BLD AUTO: 11 FL (ref 7.4–10.4)
POTASSIUM SERPL-SCNC: 4 MMOL/L (ref 3.5–5.1)
PROT SERPL-MCNC: 6.9 GM/DL (ref 5.8–7.6)
PSA SERPL-MCNC: 2.07 NG/ML
RBC # BLD AUTO: 4.76 X10(6)/MCL (ref 4.7–6.1)
SODIUM SERPL-SCNC: 141 MMOL/L (ref 132–146)
TRIGL SERPL-MCNC: 80 MG/DL (ref 34–140)
TSH SERPL-ACNC: 4.8 UIU/ML (ref 0.35–4.94)
VLDLC SERPL CALC-MCNC: 16 MG/DL
WBC # SPEC AUTO: 9.19 X10(3)/MCL (ref 4.5–11.5)

## 2024-04-12 PROCEDURE — 84443 ASSAY THYROID STIM HORMONE: CPT

## 2024-04-12 PROCEDURE — 85025 COMPLETE CBC W/AUTO DIFF WBC: CPT

## 2024-04-12 PROCEDURE — 84153 ASSAY OF PSA TOTAL: CPT

## 2024-04-12 PROCEDURE — 36415 COLL VENOUS BLD VENIPUNCTURE: CPT

## 2024-04-12 PROCEDURE — 80061 LIPID PANEL: CPT

## 2024-04-12 PROCEDURE — 80053 COMPREHEN METABOLIC PANEL: CPT

## 2024-04-17 ENCOUNTER — OFFICE VISIT (OUTPATIENT)
Dept: INTERNAL MEDICINE | Facility: CLINIC | Age: 89
End: 2024-04-17
Payer: MEDICARE

## 2024-04-17 VITALS
BODY MASS INDEX: 29.16 KG/M2 | DIASTOLIC BLOOD PRESSURE: 78 MMHG | HEIGHT: 73 IN | HEART RATE: 73 BPM | OXYGEN SATURATION: 96 % | SYSTOLIC BLOOD PRESSURE: 108 MMHG

## 2024-04-17 DIAGNOSIS — G30.1 ALZHEIMER'S DISEASE WITH LATE ONSET: Primary | ICD-10-CM

## 2024-04-17 DIAGNOSIS — I25.10 CORONARY ARTERY DISEASE, UNSPECIFIED VESSEL OR LESION TYPE, UNSPECIFIED WHETHER ANGINA PRESENT, UNSPECIFIED WHETHER NATIVE OR TRANSPLANTED HEART: ICD-10-CM

## 2024-04-17 DIAGNOSIS — E78.5 HYPERLIPIDEMIA, UNSPECIFIED HYPERLIPIDEMIA TYPE: ICD-10-CM

## 2024-04-17 DIAGNOSIS — I72.9 ANEURYSM OF UNSPECIFIED SITE: ICD-10-CM

## 2024-04-17 DIAGNOSIS — I10 ESSENTIAL (PRIMARY) HYPERTENSION: ICD-10-CM

## 2024-04-17 DIAGNOSIS — F02.80 ALZHEIMER'S DISEASE WITH LATE ONSET: Primary | ICD-10-CM

## 2024-04-17 PROCEDURE — G0439 PPPS, SUBSEQ VISIT: HCPCS | Mod: ,,, | Performed by: INTERNAL MEDICINE

## 2024-04-17 NOTE — PROGRESS NOTES
Patient ID: Wolfgang Duval is a 89 y.o. male.    Chief Complaint: Medicare AWV (Labs 4/12)      Patient Care Team:  Toan Ford II, MD as PCP - General (Internal Medicine)  [facility] (Home Health Services)  Scout Vogt MD as Consulting Physician (Cardiology)     HPI:   Patient presents here today for above reason.     Wolfgang is a 89-year-old gentleman presents today for annual visit actually doing fairly well.  He lives at home with his wife.  Currently he has in a wheelchair but he was ambulatory he does walk around [place] in [place] few times a day able to ambulate to the restroom.  His MMSE is little bit lower in his ADLs are little lower.  He does require some cuing while eating and other ADLs.  Otherwise he is awake oriented and alert today.  Vital signs stable labs are all within normal limits he was taken off his Plavix by his cardiologist and also lowered his carvedilol.  His blood pressure is stable but sometime is orthostatic.  Otherwise no falls here for follow-up.    The patient's Health Maintenance was reviewed and the following appears to be due at this time:   Health Maintenance Due   Topic Date Due    TETANUS VACCINE  Never done    RSV Vaccine (Age 60+ and Pregnant patients) (1 - 1-dose 60+ series) Never done    Shingles Vaccine (2 of 3) 08/24/2012    Pneumococcal Vaccines (Age 65+) (2 of 2 - PPSV23 or PCV20) 03/26/2020    Influenza Vaccine (1) 09/01/2023    COVID-19 Vaccine (2 - 2023-24 season) 09/01/2023        Past Medical History:  No past medical history on file.  Past Surgical History:   Procedure Laterality Date    APPENDECTOMY      CATARACT EXTRACTION       Review of patient's allergies indicates:   Allergen Reactions    Penicillins      Current Outpatient Medications on File Prior to Visit   Medication Sig Dispense Refill    aspirin 81 MG Chew Take 1 tablet by mouth once daily.      atorvastatin (LIPITOR) 20 MG tablet Take 20 mg by mouth every evening.       carvediloL (COREG) 6.25 MG tablet Take 3.125 mg by mouth 2 (two) times daily.      clotrimazole (LOTRIMIN) 1 % cream Apply topically 2 (two) times daily. Apply Good Rx Coupon 60 g 2    multivitamin capsule Take 1 capsule by mouth once daily.      nystatin (MYCOSTATIN) cream Apply topically 2 (two) times daily. 30 g 1    tamsulosin (FLOMAX) 0.4 mg Cap Take 1 capsule by mouth once daily.      [DISCONTINUED] clopidogreL (PLAVIX) 75 mg tablet Take 75 mg by mouth once daily. (Patient not taking: Reported on 4/17/2024)       No current facility-administered medications on file prior to visit.     Social History     Socioeconomic History    Marital status:    Tobacco Use    Smoking status: Never    Smokeless tobacco: Never   Substance and Sexual Activity    Alcohol use: Never    Drug use: Never    Sexual activity: Not Currently     Family History   Problem Relation Name Age of Onset    Coronary artery disease Father      Coronary artery disease Sister      Coronary artery disease Brother         ROS:   Review of Systems  Constitutional: No weight gain, No fever, No chills, No fatigue.   Eyes: No blurring, No visual disturbances.   Ear/Nose/Mouth/Throat: No decreased hearing, No ear pain, No nasal congestion, No sore throat.   Respiratory: No shortness of breath, No cough, No wheezing.   Cardiovascular: No chest pain, No palpitations, No peripheral edema.   Gastrointestinal: No nausea, No vomiting, No diarrhea, No constipation, No abdominal pain.   Genitourinary: No dysuria, No hematuria.   Hematology/Lymphatics: No bruising tendency, No bleeding tendency, No swollen lymph glands.   Endocrine: No excessive thirst, No polyuria, No excessive hunger.   Musculoskeletal: No joint pain, No muscle pain, No decreased range of motion.   Integumentary: No rash, No pruritus.   Neurologic: No abnormal balance, No confusion, No headache.   Psychiatric: No anxiety, No depression, Not suicidal, No hallucinations.        Patient  Reported Health Risk Assessment  What is your age?: 80 or older  Are you male or female?: Male  During the past four weeks, how much have you been bothered by emotional problems such as feeling anxious, depressed, irritable, sad, or downhearted and blue?: Not at all  During the past five weeks, has your physical and/or emotional health limited your social activities with family, friends, neighbors, or groups?: Not at all  During the past four weeks, how much bodily pain have you generally had?: No pain  During the past four weeks, was someone available to help if you needed and wanted help?: Yes, as much as I wanted  During the past four weeks, what was the hardest physical activity you could do for at least two minutes?: Moderate  Can you get to places out of walking distance without help?  (For example, can you travel alone on buses or taxis, or drive your own car?): Yes  Can you go shopping for groceries or clothes without someone's help?: Yes  Can you prepare your own meals?: Yes  Can you do your own housework without help?: Yes  Because of any health problems, do you need the help of another person with your personal care needs such as eating, bathing, dressing, or getting around the house?: No  Can you handle your own money without help?: Yes  During the past four weeks, how would you rate your health in general?: Excellent  How have things been going for you during the past four weeks?: Very well  Are you having difficulties driving your car?: No  Do you always fasten your seat belt when you are in a car?: Yes, usually  How often in the past four weeks have you been bothered by falling or dizzy when standing up?: Never  How often in the past four weeks have you been bothered by sexual problems?: Never  How often in the past four weeks have you been bothered by trouble eating well?: Never  How often in the past four weeks have you been bothered by teeth or denture problems?: Never  How often in the past four  "weeks have you been bothered with problems using the telephone?: Never  How often in the past four weeks have you been bothered by tiredness or fatigue?: Never  Have you fallen two or more times in the past year?: No  Are you afraid of falling?: No  Are you a smoker?: No  During the past four weeks, how many drinks of wine, beer, or other alcoholic beverages did you have?: No alcohol at all  Do you exercise for about 20 minutes three or more days a week?: Yes, some of the time  Have you been given any information to help you with hazards in your house that might hurt you?: Yes  Have you been given any information to help you with keeping track of your medications?: Yes  How often do you have trouble taking medicines the way you've been told to take them?: I always take them as prescribed  How confident are you that you can control and manage most of your health problems?: Very confident  What is your race? (Check all that apply.):     Vitals/PE:   /78 (BP Location: Left arm, Patient Position: Sitting)   Pulse 73   Ht 6' 1" (1.854 m)   SpO2 96%   BMI 29.16 kg/m²   Physical Exam    General: Alert and oriented, No acute distress.   Eye: Normal conjunctiva without exudate.  HENMT: Normocephalic/AT, Normal hearing, Oral mucosa is moist and pink   Neck: No goiter visualized.   Respiratory: Lungs CTAB, Respirations are non-labored, Breath sounds are equal, Symmetrical chest wall expansion.  Cardiovascular: Normal rate, Regular rhythm, No murmur, No edema.   Gastrointestinal: Non-distended.   Genitourinary: Deferred.  Musculoskeletal: Normal ROM, Normal gait, No deformities or amputations.  Integumentary: Warm, Dry, Intact. No diaphoresis, or flushing.  Neurologic: No focal deficits, Cranial Nerves II-XII are grossly intact.   Psychiatric: Cooperative, Appropriate mood & affect, Normal judgment, Non-suicidal.             No data to display                  4/17/2024     2:20 PM 11/29/2023     1:40 PM " "8/15/2023     1:20 PM 4/10/2023     1:40 PM 1/23/2023     3:00 PM   Fall Risk Assessment - Outpatient   Mobility Status Ambulatory w/ assistance Wheelchair Bound Ambulatory Ambulatory w/ assistance Ambulatory w/ assistance   Number of falls 0 0 2+ 0 1   Identified as fall risk True False True True True           Depression Screening  Over the past two weeks, has the patient felt down, depressed, or hopeless?: No  Over the past two weeks, has the patient felt little interest or pleasure in doing things?: No  Functional Ability/Safety Screening  Was the patient's timed Up & Go test unsteady or longer than 30 seconds?: No  Does the patient need help with phone, transportation, shopping, preparing meals, housework, laundry, meds, or managing money?: No  Does the patient's home have rugs in the hallway, lack grab bars in the bathroom, lack handrails on the stairs or have poor lighting?: No  Have you noticed any hearing difficulties?: No  A "Yes" response to any of the above questions should trigger further investigation.: 0  Cognitive Function (Assessed through direct observation with due consideration of information obtained by way of patient reports and/or concerns raised by family, friends, caretakers, or others)    Does the patient repeat questions/statements in the same day?: No  Does the patient have trouble remembering the date, year, and time?: No  Does the patient have difficulty managing finances?: No  Does the patient have a decreased sense of direction?: No  A "Yes" response to any of the above questions could indicate mild cognitive impairment and should trigger further investigation.: 0    Assessment/Plan:       1. Alzheimer's disease with late onset   progressive  2. Essential (primary) hypertension   At goal, cont current rx.   3. Hyperlipidemia, unspecified hyperlipidemia type   At goal, cont current rx .  4. Coronary artery disease, unspecified vessel or lesion type, unspecified whether angina present, " unspecified whether native or transplanted heart   Currently off plavix. Sees cv  5. Aneurysm of unspecified site   Resolved.            Education and counseling done face to face regarding medical conditions and plan. Contact office if new symptoms develop. Should any symptoms ever significantly worsen seek emergency medical attention/go to ER. Follow up at least yearly for wellness or sooner PRN. Nurse to call patient with any results. The patient is receptive, expresses understanding and is agreeable to plan. All questions have been answered.    No follow-ups on file.      Medicare Annual Wellness and Personalized Prevention Plan:   Fall Risk + Home Safety + Hearing Impairment + Depression Screen + Cognitive Impairment Screen + Health Risk Assessment all reviewed.    Advance Care Planning   I attest to discussing Advance Care Planning with patient and/or family member.  Education was provided including the importance of the Health Care Power of , Advance Directives, and/or [place] documentation.  The patient expressed understanding to the importance of this information and discussion.  Length of ACP conversation in minutes:          Opioid Screening: Patient medication list reviewed, patient is not taking prescription opioids. Patient is not using additional opioids than prescribed. Patient is at low risk of substance abuse based on this opioid use history.

## 2025-04-16 DIAGNOSIS — F02.80 ALZHEIMER'S DISEASE WITH LATE ONSET: ICD-10-CM

## 2025-04-16 DIAGNOSIS — Z00.00 WELLNESS EXAMINATION: Primary | ICD-10-CM

## 2025-04-16 DIAGNOSIS — I72.9 ANEURYSM OF UNSPECIFIED SITE: ICD-10-CM

## 2025-04-16 DIAGNOSIS — Z12.5 SCREENING PSA (PROSTATE SPECIFIC ANTIGEN): ICD-10-CM

## 2025-04-16 DIAGNOSIS — R53.83 FATIGUE, UNSPECIFIED TYPE: ICD-10-CM

## 2025-04-16 DIAGNOSIS — E78.5 HYPERLIPIDEMIA, UNSPECIFIED HYPERLIPIDEMIA TYPE: ICD-10-CM

## 2025-04-16 DIAGNOSIS — I25.10 CORONARY ARTERY DISEASE, UNSPECIFIED VESSEL OR LESION TYPE, UNSPECIFIED WHETHER ANGINA PRESENT, UNSPECIFIED WHETHER NATIVE OR TRANSPLANTED HEART: ICD-10-CM

## 2025-04-16 DIAGNOSIS — I10 ESSENTIAL (PRIMARY) HYPERTENSION: ICD-10-CM

## 2025-04-16 DIAGNOSIS — G30.1 ALZHEIMER'S DISEASE WITH LATE ONSET: ICD-10-CM

## 2025-04-17 ENCOUNTER — TELEPHONE (OUTPATIENT)
Dept: INTERNAL MEDICINE | Facility: CLINIC | Age: OVER 89
End: 2025-04-17
Payer: MEDICARE

## 2025-04-17 NOTE — TELEPHONE ENCOUNTER
----- Message from Nurse Ni sent at 4/16/2025  7:42 AM CDT -----  Regarding: paolo arzate 4/24 @2  Are there any outstanding tasks in patient chart? Needs fasting labsIs there documentation of outstanding tasks in patient chart? noHas patient been to the ER, urgent care, or another physician since last visit?Has patient done any blood work or x-rays since last visit?5. PLEASE HAVE PATIENT BRING MEDICATION LIST OR BOTTLES TO EVERY OFFICE VISIT

## 2025-04-22 ENCOUNTER — LAB VISIT (OUTPATIENT)
Dept: LAB | Facility: HOSPITAL | Age: OVER 89
End: 2025-04-22
Attending: INTERNAL MEDICINE
Payer: MEDICARE

## 2025-04-22 DIAGNOSIS — Z12.5 SCREENING PSA (PROSTATE SPECIFIC ANTIGEN): ICD-10-CM

## 2025-04-22 DIAGNOSIS — I10 ESSENTIAL (PRIMARY) HYPERTENSION: ICD-10-CM

## 2025-04-22 DIAGNOSIS — F02.80 ALZHEIMER'S DISEASE WITH LATE ONSET: ICD-10-CM

## 2025-04-22 DIAGNOSIS — E78.5 HYPERLIPIDEMIA, UNSPECIFIED HYPERLIPIDEMIA TYPE: ICD-10-CM

## 2025-04-22 DIAGNOSIS — I72.9 ANEURYSM OF UNSPECIFIED SITE: ICD-10-CM

## 2025-04-22 DIAGNOSIS — R53.83 FATIGUE, UNSPECIFIED TYPE: ICD-10-CM

## 2025-04-22 DIAGNOSIS — G30.1 ALZHEIMER'S DISEASE WITH LATE ONSET: ICD-10-CM

## 2025-04-22 DIAGNOSIS — Z00.00 WELLNESS EXAMINATION: ICD-10-CM

## 2025-04-22 DIAGNOSIS — I25.10 CORONARY ARTERY DISEASE, UNSPECIFIED VESSEL OR LESION TYPE, UNSPECIFIED WHETHER ANGINA PRESENT, UNSPECIFIED WHETHER NATIVE OR TRANSPLANTED HEART: ICD-10-CM

## 2025-04-22 LAB
ALBUMIN SERPL-MCNC: 3.4 G/DL (ref 3.4–4.8)
ALBUMIN/GLOB SERPL: 0.9 RATIO (ref 1.1–2)
ALP SERPL-CCNC: 90 UNIT/L (ref 40–150)
ALT SERPL-CCNC: 12 UNIT/L (ref 0–55)
ANION GAP SERPL CALC-SCNC: 6 MEQ/L
AST SERPL-CCNC: 15 UNIT/L (ref 11–45)
BASOPHILS # BLD AUTO: 0.06 X10(3)/MCL
BASOPHILS NFR BLD AUTO: 0.5 %
BILIRUB SERPL-MCNC: 0.8 MG/DL
BUN SERPL-MCNC: 19.6 MG/DL (ref 8.4–25.7)
CALCIUM SERPL-MCNC: 9 MG/DL (ref 8.8–10)
CHLORIDE SERPL-SCNC: 109 MMOL/L (ref 98–111)
CHOLEST SERPL-MCNC: 111 MG/DL
CHOLEST/HDLC SERPL: 3 {RATIO} (ref 0–5)
CO2 SERPL-SCNC: 26 MMOL/L (ref 23–31)
CREAT SERPL-MCNC: 1.13 MG/DL (ref 0.72–1.25)
CREAT/UREA NIT SERPL: 17
EOSINOPHIL # BLD AUTO: 0.38 X10(3)/MCL (ref 0–0.9)
EOSINOPHIL NFR BLD AUTO: 3.4 %
ERYTHROCYTE [DISTWIDTH] IN BLOOD BY AUTOMATED COUNT: 12.7 % (ref 11.5–17)
GFR SERPLBLD CREATININE-BSD FMLA CKD-EPI: >60 ML/MIN/1.73/M2
GLOBULIN SER-MCNC: 3.7 GM/DL (ref 2.4–3.5)
GLUCOSE SERPL-MCNC: 125 MG/DL (ref 75–121)
HCT VFR BLD AUTO: 43.5 % (ref 42–52)
HDLC SERPL-MCNC: 38 MG/DL (ref 35–60)
HGB BLD-MCNC: 14.6 G/DL (ref 14–18)
IMM GRANULOCYTES # BLD AUTO: 0.05 X10(3)/MCL (ref 0–0.04)
IMM GRANULOCYTES NFR BLD AUTO: 0.5 %
LDLC SERPL CALC-MCNC: 58 MG/DL (ref 50–140)
LYMPHOCYTES # BLD AUTO: 1.78 X10(3)/MCL (ref 0.6–4.6)
LYMPHOCYTES NFR BLD AUTO: 16.1 %
MCH RBC QN AUTO: 31.3 PG (ref 27–31)
MCHC RBC AUTO-ENTMCNC: 33.6 G/DL (ref 33–36)
MCV RBC AUTO: 93.1 FL (ref 80–94)
MONOCYTES # BLD AUTO: 1.17 X10(3)/MCL (ref 0.1–1.3)
MONOCYTES NFR BLD AUTO: 10.6 %
NEUTROPHILS # BLD AUTO: 7.65 X10(3)/MCL (ref 2.1–9.2)
NEUTROPHILS NFR BLD AUTO: 68.9 %
NRBC BLD AUTO-RTO: 0 %
PLATELET # BLD AUTO: 226 X10(3)/MCL (ref 130–400)
PMV BLD AUTO: 10.7 FL (ref 7.4–10.4)
POTASSIUM SERPL-SCNC: 4.2 MMOL/L (ref 3.5–5.1)
PROT SERPL-MCNC: 7.1 GM/DL (ref 5.8–7.6)
PSA SERPL-MCNC: 2.44 NG/ML
RBC # BLD AUTO: 4.67 X10(6)/MCL (ref 4.7–6.1)
SODIUM SERPL-SCNC: 141 MMOL/L (ref 136–145)
TRIGL SERPL-MCNC: 74 MG/DL (ref 34–140)
TSH SERPL-ACNC: 4.34 UIU/ML (ref 0.35–4.94)
VLDLC SERPL CALC-MCNC: 15 MG/DL
WBC # BLD AUTO: 11.09 X10(3)/MCL (ref 4.5–11.5)

## 2025-04-22 PROCEDURE — 80053 COMPREHEN METABOLIC PANEL: CPT

## 2025-04-22 PROCEDURE — 80061 LIPID PANEL: CPT

## 2025-04-22 PROCEDURE — 84443 ASSAY THYROID STIM HORMONE: CPT

## 2025-04-22 PROCEDURE — 84153 ASSAY OF PSA TOTAL: CPT

## 2025-04-22 PROCEDURE — 85025 COMPLETE CBC W/AUTO DIFF WBC: CPT

## 2025-04-22 PROCEDURE — 36415 COLL VENOUS BLD VENIPUNCTURE: CPT

## 2025-04-24 ENCOUNTER — OFFICE VISIT (OUTPATIENT)
Dept: INTERNAL MEDICINE | Facility: CLINIC | Age: OVER 89
End: 2025-04-24
Payer: MEDICARE

## 2025-04-24 VITALS
DIASTOLIC BLOOD PRESSURE: 68 MMHG | SYSTOLIC BLOOD PRESSURE: 108 MMHG | BODY MASS INDEX: 29.16 KG/M2 | HEIGHT: 73 IN | RESPIRATION RATE: 16 BRPM | HEART RATE: 70 BPM | OXYGEN SATURATION: 97 %

## 2025-04-24 DIAGNOSIS — G30.1 ALZHEIMER'S DISEASE WITH LATE ONSET: Primary | ICD-10-CM

## 2025-04-24 DIAGNOSIS — I25.10 CORONARY ARTERY DISEASE, UNSPECIFIED VESSEL OR LESION TYPE, UNSPECIFIED WHETHER ANGINA PRESENT, UNSPECIFIED WHETHER NATIVE OR TRANSPLANTED HEART: ICD-10-CM

## 2025-04-24 DIAGNOSIS — F02.80 ALZHEIMER'S DISEASE WITH LATE ONSET: Primary | ICD-10-CM

## 2025-04-24 DIAGNOSIS — I10 ESSENTIAL (PRIMARY) HYPERTENSION: ICD-10-CM

## 2025-04-24 DIAGNOSIS — E78.5 HYPERLIPIDEMIA, UNSPECIFIED HYPERLIPIDEMIA TYPE: ICD-10-CM

## 2025-04-24 NOTE — PROGRESS NOTES
Patient ID: Wolfgang Duval is a 89 y.o. male.    Chief Complaint: Medicare AWV (Labs done 4/22)      Patient Care Team:  Toan Ford II, MD as PCP - General (Internal Medicine)  [facility], [facility] (Home Health Services)  Scout Vogt MD as Consulting Physician (Cardiology)     HPI:   Patient presents here today for above reason.     Wolfgang is a 89-year-old gentleman presents today for annual visit actually doing fairly well.  He lives at home with his wife.  Currently he has in a wheelchair but he was ambulatory he does walk around his island in his house few times a day able to ambulate to the restroom.  His MMSE is little bit lower in his ADLs are little lower.  He does require some cuing while eating and other ADLs.  Otherwise he is awake oriented and alert today.  Vital signs stable labs are all within normal limits he was taken off his Plavix by his cardiologist and also lowered his carvedilol.  His blood pressure is stable but sometime is orthostatic.  Otherwise no falls here for follow-up.       The patient's Health Maintenance was reviewed and the following appears to be due at this time:   Health Maintenance Due   Topic Date Due    TETANUS VACCINE  Never done    RSV Vaccine (Age 60+ and Pregnant patients) (1 - 1-dose 75+ series) Never done    Shingles Vaccine (2 of 3) 08/24/2012    Pneumococcal Vaccines (Age 50+) (2 of 2 - PPSV23) 03/26/2020    Influenza Vaccine (1) 09/01/2024    COVID-19 Vaccine (2 - 2024-25 season) 09/01/2024        Past Medical History:  History reviewed. No pertinent past medical history.  Past Surgical History:   Procedure Laterality Date    APPENDECTOMY      CATARACT EXTRACTION       Review of patient's allergies indicates:   Allergen Reactions    Penicillins      Medications Ordered Prior to Encounter[1]  Social History[2]  Family History   Problem Relation Name Age of Onset    Coronary artery disease Father      Coronary artery disease Sister      Coronary artery disease  Brother         ROS:   Review of Systems  Constitutional: No weight gain, No fever, No chills, No fatigue.   Eyes: No blurring, No visual disturbances.   Ear/Nose/Mouth/Throat: No decreased hearing, No ear pain, No nasal congestion, No sore throat.   Respiratory: No shortness of breath, No cough, No wheezing.   Cardiovascular: No chest pain, No palpitations, No peripheral edema.   Gastrointestinal: No nausea, No vomiting, No diarrhea, No constipation, No abdominal pain.   Genitourinary: No dysuria, No hematuria.   Hematology/Lymphatics: No bruising tendency, No bleeding tendency, No swollen lymph glands.   Endocrine: No excessive thirst, No polyuria, No excessive hunger.   Musculoskeletal: No joint pain, No muscle pain, No decreased range of motion.   Integumentary: No rash, No pruritus.   Neurologic: No abnormal balance, No confusion, No headache.   Psychiatric: No anxiety, No depression, Not suicidal, No hallucinations.        A comprehensive HEALTH RISK ASSESSMENT was completed today. Results are summarized below:    There are NO EMOTIONAL/SOCIAL CONCERNS identified on today's screening for Social Isolation, Depression and Anxiety.    There are NO COGNITIVE FUNCTION CONCERNS identified on today's screening.  The following FUNCTIONAL AND/OR SAFETY CONCERNS were identified on today's screening for Physical Symptoms, Nutritional, Home Safety/Living Situation, Fall Risk, Activities of Daily Living, Independent Activities of Daily Living, Physical Activity,Timed Up and Go test and Whisper test::   *Patient reports he NEEDS AMBULATION ASSISTANCE. (Do you use an assistance device to easily get around?: (!) Yes)(Ambulation Assistance: Walker (standard))     The patient reports NO OPIOID PRESCRIPTIONS. This was confirmed through medication reconciliation and the [facility] website.    The patient is NOT A TOBACCO USER.        All Questions regarding food, transportation or housing were not answered today.     Vitals/PE:   BP  "108/68 (BP Location: Left arm, Patient Position: Sitting)   Pulse 70   Resp 16   Ht 6' 1" (1.854 m)   SpO2 97%   BMI 29.16 kg/m²   Physical Exam    General: Alert and oriented, No acute distress.   Eye: Normal conjunctiva without exudate.  HENMT: Normocephalic/AT, Normal hearing, Oral mucosa is moist and pink   Neck: No goiter visualized.   Respiratory: Lungs CTAB, Respirations are non-labored, Breath sounds are equal, Symmetrical chest wall expansion.  Cardiovascular: Normal rate, Regular rhythm, No murmur, No edema.   Gastrointestinal: Non-distended.   Genitourinary: Deferred.  Musculoskeletal: Normal ROM, Normal gait, No deformities or amputations.  Integumentary: Warm, Dry, Intact. No diaphoresis, or flushing.  Neurologic: No focal deficits, Cranial Nerves II-XII are grossly intact.   Psychiatric: Cooperative, Appropriate mood & affect, Normal judgment, Non-suicidal.             No data to display                  4/24/2025     2:00 PM 4/17/2024     2:20 PM 11/29/2023     1:40 PM 8/15/2023     1:20 PM 4/10/2023     1:40 PM 1/23/2023     3:00 PM   Fall Risk Assessment - Outpatient   Mobility Status Ambulatory w/ assistance Ambulatory w/ assistance Wheelchair Bound Ambulatory Ambulatory w/ assistance Ambulatory w/ assistance   Number of falls 0 0 0 2+ 0 1   Identified as fall risk True True False True True True                Assessment/Plan:       1. Alzheimer's disease with late onset    2. Hyperlipidemia, unspecified hyperlipidemia type    3. Essential (primary) hypertension    4. Coronary artery disease, unspecified vessel or lesion type, unspecified whether angina present, unspecified whether native or transplanted heart         Plan:  Labs wnl  Screening up todate  Cont current rx.  Rtc 6 mo -1 year.      Education and counseling done face to face regarding medical conditions and plan. Contact office if new symptoms develop. Should any symptoms ever significantly worsen seek emergency medical " attention/go to ER. Follow up at least yearly for wellness or sooner PRN. Nurse to call patient with any results. The patient is receptive, expresses understanding and is agreeable to plan. All questions have been answered.    No follow-ups on file.      Medicare Annual Wellness and Personalized Prevention Plan:   Fall Risk + Home Safety + Hearing Impairment + Depression Screen + Cognitive Impairment Screen + Health Risk Assessment all reviewed.    Advance Care Planning   I attest to discussing Advance Care Planning with patient and/or family member.  Education was provided including the importance of the Health Care Power of , Advance Directives, and/or [place] documentation.  The patient expressed understanding to the importance of this information and discussion.  Length of ACP conversation in minutes:                 [1]   Current Outpatient Medications on File Prior to Visit   Medication Sig Dispense Refill    atorvastatin (LIPITOR) 20 MG tablet Take 20 mg by mouth every evening.      carvediloL (COREG) 6.25 MG tablet Take 3.125 mg by mouth 2 (two) times daily.      clotrimazole (LOTRIMIN) 1 % cream Apply topically 2 (two) times daily. Apply Good Rx Coupon 60 g 2    multivitamin capsule Take 1 capsule by mouth once daily.      nystatin (MYCOSTATIN) cream Apply topically 2 (two) times daily. 30 g 1    tamsulosin (FLOMAX) 0.4 mg Cap Take 1 capsule by mouth once daily.      aspirin 81 MG Chew Take 1 tablet by mouth once daily.       No current facility-administered medications on file prior to visit.   [2]   Social History  Socioeconomic History    Marital status:    Tobacco Use    Smoking status: Never    Smokeless tobacco: Never   Substance and Sexual Activity    Alcohol use: Never    Drug use: Never    Sexual activity: Not Currently

## 2025-08-18 ENCOUNTER — TELEPHONE (OUTPATIENT)
Dept: INTERNAL MEDICINE | Facility: CLINIC | Age: OVER 89
End: 2025-08-18
Payer: MEDICARE

## (undated) NOTE — LETTER
[facility] [address], [address]  Consent for Procedure/Sedation    Date:    Time:       1.  I authorize the performance upon Sukhdev Barrera the following:cardiac catheterization, left ventricular cineangiography, bilateral select period, the physician will determine when the applicable recovery period ends for purposes of reinstating the Do Not Resuscitate (DNR) order.     Signature of Patient: ____________________________________________________    Signature of person authorized

## (undated) NOTE — IP AVS SNAPSHOT
[address]            (For Outpatient Use Only) Initial Admit Date: 6/5/2019   Inpt/Obs Admit Date: Inpt: 6/5/19 / Obs: N/A   Discharge Date:    Becky Later:  [de-identified]   MRN: [de-identified]   CSN: [id number]   CEID: [id number] Subscriber ID:  Pt Rel to Subscriber:    Hospital Account Financial Class: Medicare    June 7, 2019

## (undated) NOTE — IP AVS SNAPSHOT
[facility] [place] [address] [place], [address] ~ [phone]                Discharge Summary   2/9/2017    Mo Howell           Admission Information        Provider Department    2/9/2017 Shade Baxter MD  2ne-A         Thank furosemide 20 MG Tabs   Last time this was given:  20 mg on 2/17/2017  7:52 AM   Commonly known as:  LASIX   Next dose due:  2/18 9 a.m. Take 20 mg by mouth daily.                             GLUCOS-CHONDROIT-MSM COMPLEX OR   Next dos Discharge References/Attachments     ACUTE BRONCHITIS, WHAT IS (ENGLISH)      Follow-up Information     Follow up with Eliana Nayak MD In 2 weeks.     Specialties:  PULMONARY DISEASES, Intensivist    Contact information:    [address] 0.1  (02/10/17)  8.19 (H) (02/10/17)  0.39 (L) (02/10/17)  0.18 (02/10/17)  0.00 (02/10/17)  0.01    (02/09/17)  84.0 (02/09/17)  5.6 (02/09/17)  8.7 (02/09/17)  0.9 (02/09/17)  0.2  (02/09/17)  9.45 (H) (02/09/17)  0.63 (L) (02/09/17)  0.98 (H) (02/09/17) _____________________________________________________________________________    Medication Side Effects - Medications to be taken at home  As your caregivers, we want you to be aware of the medications you are prescribed to take and their potential SIDE E Fluticasone Furoate-Vilanterol (BREO ELLIPTA) 200-25 MCG/INH Inhalation Aerosol Powder, Breath Activated    Umeclidinium Bromide 62.5 MCG/INH Inhalation Aerosol Powder, Breath Activated    ipratropium-albuterol (DUONEB) 0.5-2.5 (3) MG/3ML Inhalation Solut

## (undated) NOTE — IP AVS SNAPSHOT
Patient Demographics     Address  [address] [place] [address] AND [facility]  Kuldip Reed [address] Phone  [phone] [facility])  [phone] (Mobile) *Preferred*      Emergency Contact(s)     Name Relation Home Work [address] [place] [address] [employer] [facility] Daughter [phone]     BYRON Hoover Biotin 10 MG Caps      Take 10 mg by mouth daily. Fluticasone Propionate 50 MCG/ACT Susp  Commonly known as:  FLONASE      1 spray by Each Nare route daily.    Sarah Bray MD         furosemide 40 MG Tabs  Commonly known as:  LASIX      Take 1 Take 1 tablet (500 mg total) by mouth 2 (two) times daily for 10 days. Stop taking on:  6/16/2019   Todd Moore MD         vitamin E 400 UNITS Caps      Take 400 Units by mouth daily.                 Where to Get Your Medications      These medi [id number] ipratropium-albuterol (DUONEB) nebulizer solution 3 mL 06/07/19 1205 Given      [id number] predniSONE (DELTASONE) tab 15 mg 06/06/19 1702 Given      [id number] predniSONE (DELTASONE) tab 15 mg 06/07/19 0823 Given      [id number] prednisoLONE acetat noted to have lesions over L eyelid and L forehead. Pt reports these are somewhat painful, mostly when he closes his eyes. He is having intermittent blurring of vision of his L eye. He denies any recent fevers or chills.  After d/w his daughter Dr. Kilgore Lipoma wi Outpatient Medications Marked as Taking for the 6/5/19 encounter [facility] Encounter):  predniSONE 10 MG Oral Tab Take 3 tablets twice a day until you follow up with Dr. Quyen Newton: 100 tablet Rfl: 1   apixaban 5 MG Oral Tab Take 1 tablet (5 mg total) by m • Stroke Father    • Cancer Mother         Cancer - stomach   • Eye Problems Brother         Eye disorder   • Other (Other[other]) Brother         Hearing loss       Review of Systems  Comprehensive 10-point ROS reviewed and negative except for what is sta [id number] MRN:     [id number] Gender:  M Ordering Phys: Kena Pedersen MD Reading Phys:  Erin Masters Technologist:  Gene Wallace RVT ---------------------------------------------------------------------------- Indications:  Left leg swelling [M79.89 (I +------------------+------------------+----------+-------------------------+ ! Right common      ! Patent            ! None      ! Normal phasicity;        ! !femoral           !                  !          !spontaneous; normal      ! !                  ! + !Right popliteal   !Patent            ! None      ! Normal phasicity;        ! !                  !                  !          !spontaneous; normal      ! !                  !                  !          !augmentation;            ! !                  ! ! augmentation; partially  ! !                  !                  !          !compressible             ! +------------------+------------------+----------+-------------------------+ ! Left              ! Patent            ! None      ! Compressible ! !                  !                  !          !noncompressible          ! +------------------+------------------+----------+-------------------------+ ! Left gastrocnemius! Totally occluded  ! Acute     ! Absent; absent           ! ! ---------------------------------------------------------------------------- Study data:  Lower extremity venous duplex [facility], [address] [facility]. [address], [facility], [address] [place] [address]  Birthdate:  Patient YOB: 1932.   Age:  Hans Leyva radiograph was obtained. COMPARISON:  [facility] , XR CHEST AP PORTABLE  (CPT=71045), 5/20/2019, 5:04. INDICATIONS:  dyspnea  PATIENT STATED HISTORY: (As transcribed by Technologist)  Dyspnea. Patient offered no additional history at this time.     FINDINGS: silhouette and pulmonary vasculature are unremarkable. Emphysematous changes are noted. Left-sided pacemaker and leads are identified. Interstitial abnormalities the lungs are mildly more prominent at the mid to lower lung bases.   Small bilateral pleural ventilated with 41.4 mCi Tc-99m DTPA in the nebulizer, and supine images of the lungs were obtained in the 6 standard projections. This was followed by IV injection of 5.3 mCi Tc-99m MAA, and similar projection images were obtained.   FINDINGS: PERFUSION: Electronically signed by Hetal Shell MD on 6/5/2019  5:56 PM   Attribution Key    CM. 1 - Hetal Shell MD on 6/5/2019  5:39 PM  CM. 2 - Hetal Shell MD on 6/5/2019  5:49 PM                        Consults - MD Consult Notes      Consults file showed no dendrites. Fluorescein dye was used. The anterior chambers were formed. The irises were normal.  A limited view of the posterior pole showed no hemorrhages or edema present of the optic nerve or posterior pole.       ASSESSMENT:  The patient ha team.  For all other patients, please follow usual protocol for discharge care transition. Discharge Condition: stable    Disposition:  CONSTANZA    Important Follow up:  - PCP within 5 days of CONSTANZA discharge.   - Consults: Renal, ID, ophtho    Follow Up Items: apixaban 5 MG Oral Tab  Take 1 tablet (5 mg total) by mouth 2 (two) times daily. furosemide 40 MG Oral Tab  Take 1 tablet (40 mg total) by mouth BID (Diuretic). Azelastine HCl 0.1 % Nasal Solution  2 sprays by Nasal route 2 (two) times daily.     Zhane Acevedo Pulse: 86   Resp: 18   Temp: 98.1 °F (36.7 °C)       Gen: No acute distress, alert and oriented x3, no focal neurologic deficits  HEENT: no conjunctival injection on L.   Several vesicles noted above L forehead and above L eye lid  Pulm: Decreased at the ba Important Follow up:  - PCP within 5 days of CONSTANZA discharge.   - Consults: Renal, ID, ophtho    Follow Up Items:  Repeat BMP on 6/10 per renal      Hospital Course:         87y/o M with MMP including COPD, heart block s/p PPM, DVT on eliquis, CKD, HTN who wa Azelastine HCl 0.1 % Nasal Solution  2 sprays by Nasal route 2 (two) times daily. Senna-Docusate Sodium 8.6-50 MG Oral Tab  Take 2 tablets by mouth 2 (two) times daily as needed for constipation.     GuaiFENesin  MG Oral Tablet 12 Hr  Take 1 tablet HEENT: no conjunctival injection on L. Several vesicles noted above L forehead and above L eye lid  Pulm: Decreased at the bases. CV: Heart with regular rate and rhythm, no murmur. Normal PMI.     Abd: Abdomen soft, nontender, nondistended, no organome Past Medical History:   Diagnosis Date   • Anemia    • Arrhythmia 2010    heart block with PPM   • Cataract     sx 3 mo ago alfonso eyes   • Chicken pox    • COPD (chronic obstructive pulmonary disease) (HCC)    • COPD (chronic obstructive pulmonary disease) ( Precautions: Hard of hearing  Fall Risk: Standard fall risk    WEIGHT BEARING RESTRICTION  Weight Bearing Restriction: None                PAIN ASSESSMENT  Ratin  Location: pain over L eyelid       COGNITION  · Overall Cognitive Status:  WFL - within f and D/C plans. Pt verbalized understanding. Pt supine to sit with mod ind. Pt sit to stand with sup and amb into mclean with RW on 6 L O2 and amb 200' with only one short standing rest break with sup. Pt returned to room and sat in chair.  Pt reported David Sun Goal #2 Patient is able to demonstrate transfers Sit to/from Stand at assistance level: modified independent     Goal #3 Patient is able to ambulate 300 feet with assist device: walker - rolling at assistance level: modified independent     Goal #4 Negotia alfonso hearing aides   • Hemorrhoids    • Lung disease    • Measles    • OTHER DISEASES     barretts esphogus   • Pneumonia due to organism    • Problems with swallowing    • PULMONARY DISEASE     fibrosis   • Renal disorder     mild   • Shortness of breath Patient self-stated goal is none identified      OBJECTIVE[LD.1]  Precautions: Hard of hearing  Fall Risk: Standard fall risk[LD.2]    WEIGHT BEARING RESTRICTION[LD.1]  Weight Bearing Restriction: None[LD.2]                PAIN ASSESSMENT[LD.1]  Ratin verbal/visual/tactile cues for safety with RW use and hand placement. Pt performed functional mobility with supervision assistance and RW x approx 200ft>chair. Pt was educated on safety precautions.  Pt was left in his chair with questions answered, needs m OT Discharge Recommendations: Home with home health PT/OT; Intermittent Supervision  OT Device Recommendations: TBD[LD.2]    PLAN[LD.1]  OT Treatment Plan: Balance activities; Energy conservation/work simplification techniques;ADL training;Functional transfe 9/19/2019 1:00 PM Hira Crandall AUD [address], ENT - [phone]    9/19/2019 1:15 PM JACQUI Cai [address], [address]NT - [address]